# Patient Record
Sex: MALE | Race: ASIAN | NOT HISPANIC OR LATINO | Employment: FULL TIME | URBAN - METROPOLITAN AREA
[De-identification: names, ages, dates, MRNs, and addresses within clinical notes are randomized per-mention and may not be internally consistent; named-entity substitution may affect disease eponyms.]

---

## 2023-01-27 ENCOUNTER — TELEPHONE (OUTPATIENT)
Dept: GASTROENTEROLOGY | Facility: CLINIC | Age: 58
End: 2023-01-27
Payer: COMMERCIAL

## 2023-01-27 NOTE — TELEPHONE ENCOUNTER
----- Message from Connie Nicholson sent at 1/27/2023  3:10 PM CST -----  Type: Patient Call Back        Who called: self         What is the request in detail: Pt states he would like to get an colonoscopy done ...         Can the clinic reply by [facility]? No         Would the patient rather a call back or a response via [facility]? Yes         Best call back number: [phone] (home)               Thank You

## 2023-01-27 NOTE — TELEPHONE ENCOUNTER
Patient is scheduled for in clinic appointment on 03/01/2023 @ 9:00 am with Dr. Kerns to establish care.

## 2023-03-01 ENCOUNTER — OFFICE VISIT (OUTPATIENT)
Dept: GASTROENTEROLOGY | Facility: CLINIC | Age: 58
End: 2023-03-01
Payer: COMMERCIAL

## 2023-03-01 VITALS
SYSTOLIC BLOOD PRESSURE: 147 MMHG | BODY MASS INDEX: 27.22 KG/M2 | WEIGHT: 163.38 LBS | DIASTOLIC BLOOD PRESSURE: 77 MMHG | HEART RATE: 62 BPM | HEIGHT: 65 IN

## 2023-03-01 DIAGNOSIS — Z12.11 COLON CANCER SCREENING: Primary | ICD-10-CM

## 2023-03-01 DIAGNOSIS — R19.7 DIARRHEA, UNSPECIFIED TYPE: ICD-10-CM

## 2023-03-01 PROCEDURE — 99999 PR PBB SHADOW E&M-EST. PATIENT-LVL IV: CPT | Mod: PBBFAC,,, | Performed by: STUDENT IN AN ORGANIZED HEALTH CARE EDUCATION/TRAINING PROGRAM

## 2023-03-01 PROCEDURE — 3078F PR MOST RECENT DIASTOLIC BLOOD PRESSURE < 80 MM HG: ICD-10-PCS | Mod: CPTII,S$GLB,, | Performed by: STUDENT IN AN ORGANIZED HEALTH CARE EDUCATION/TRAINING PROGRAM

## 2023-03-01 PROCEDURE — 3008F PR BODY MASS INDEX (BMI) DOCUMENTED: ICD-10-PCS | Mod: CPTII,S$GLB,, | Performed by: STUDENT IN AN ORGANIZED HEALTH CARE EDUCATION/TRAINING PROGRAM

## 2023-03-01 PROCEDURE — 3077F SYST BP >= 140 MM HG: CPT | Mod: CPTII,S$GLB,, | Performed by: STUDENT IN AN ORGANIZED HEALTH CARE EDUCATION/TRAINING PROGRAM

## 2023-03-01 PROCEDURE — 1159F PR MEDICATION LIST DOCUMENTED IN MEDICAL RECORD: ICD-10-PCS | Mod: CPTII,S$GLB,, | Performed by: STUDENT IN AN ORGANIZED HEALTH CARE EDUCATION/TRAINING PROGRAM

## 2023-03-01 PROCEDURE — 1159F MED LIST DOCD IN RCRD: CPT | Mod: CPTII,S$GLB,, | Performed by: STUDENT IN AN ORGANIZED HEALTH CARE EDUCATION/TRAINING PROGRAM

## 2023-03-01 PROCEDURE — 3078F DIAST BP <80 MM HG: CPT | Mod: CPTII,S$GLB,, | Performed by: STUDENT IN AN ORGANIZED HEALTH CARE EDUCATION/TRAINING PROGRAM

## 2023-03-01 PROCEDURE — 99204 PR OFFICE/OUTPT VISIT, NEW, LEVL IV, 45-59 MIN: ICD-10-PCS | Mod: S$GLB,,, | Performed by: STUDENT IN AN ORGANIZED HEALTH CARE EDUCATION/TRAINING PROGRAM

## 2023-03-01 PROCEDURE — 4010F PR ACE/ARB THEARPY RXD/TAKEN: ICD-10-PCS | Mod: CPTII,S$GLB,, | Performed by: STUDENT IN AN ORGANIZED HEALTH CARE EDUCATION/TRAINING PROGRAM

## 2023-03-01 PROCEDURE — 3077F PR MOST RECENT SYSTOLIC BLOOD PRESSURE >= 140 MM HG: ICD-10-PCS | Mod: CPTII,S$GLB,, | Performed by: STUDENT IN AN ORGANIZED HEALTH CARE EDUCATION/TRAINING PROGRAM

## 2023-03-01 PROCEDURE — 99999 PR PBB SHADOW E&M-EST. PATIENT-LVL IV: ICD-10-PCS | Mod: PBBFAC,,, | Performed by: STUDENT IN AN ORGANIZED HEALTH CARE EDUCATION/TRAINING PROGRAM

## 2023-03-01 PROCEDURE — 4010F ACE/ARB THERAPY RXD/TAKEN: CPT | Mod: CPTII,S$GLB,, | Performed by: STUDENT IN AN ORGANIZED HEALTH CARE EDUCATION/TRAINING PROGRAM

## 2023-03-01 PROCEDURE — 99204 OFFICE O/P NEW MOD 45 MIN: CPT | Mod: S$GLB,,, | Performed by: STUDENT IN AN ORGANIZED HEALTH CARE EDUCATION/TRAINING PROGRAM

## 2023-03-01 PROCEDURE — 3008F BODY MASS INDEX DOCD: CPT | Mod: CPTII,S$GLB,, | Performed by: STUDENT IN AN ORGANIZED HEALTH CARE EDUCATION/TRAINING PROGRAM

## 2023-03-01 RX ORDER — TENOFOVIR ALAFENAMIDE 25 MG/1
1 TABLET ORAL
COMMUNITY
Start: 2023-01-24

## 2023-03-01 RX ORDER — LOSARTAN POTASSIUM 25 MG/1
25 TABLET ORAL DAILY
COMMUNITY
Start: 2023-02-28

## 2023-03-01 RX ORDER — SITAGLIPTIN AND METFORMIN HYDROCHLORIDE 1000; 100 MG/1; MG/1
1 TABLET, FILM COATED, EXTENDED RELEASE ORAL EVERY MORNING
COMMUNITY
Start: 2023-01-13

## 2023-03-01 RX ORDER — INSULIN GLARGINE 100 [IU]/ML
INJECTION, SOLUTION SUBCUTANEOUS
Status: ON HOLD | COMMUNITY
Start: 2023-01-24 | End: 2024-01-18 | Stop reason: HOSPADM

## 2023-03-01 NOTE — PROGRESS NOTES
[facility] Consultation Note    Reason for Consult:  The primary encounter diagnosis was Colon cancer screening. A diagnosis of Diarrhea, unspecified type was also pertinent to this visit.    PCP:   Daniella Leal   No address on file    Referring MD:  Aaareferral Self  No address on file    HPI:  This is a 57 y.o. male here for evaluation of colon cancer screening.    The patient is here with his sister who helps translate.  He is here to discuss colon cancer screening.  The patient has not previously had an endoscopic or stool based method of screening.  He is interested in proceed with a colonoscopy.      Denies any constipation or blood in his stool.  He does have loose stools intermittently.  Symptoms are variable and he wonders if due to his diabetes medication.        ROS:  Constitutional: No fevers, chills, No weight loss  ENT: No allergies  CV: No chest pain  Pulm: No cough, No shortness of breath  Ophtho: No vision changes  GI: see HPI  Derm: No rash  Heme: No lymphadenopathy, No bruising  MSK: No arthritis  : No dysuria, No hematuria  Endo: No hot or cold intolerance  Neuro: No syncope, No seizure  Psych: No anxiety, No depression    Medical History:  has a past medical history of Diabetes mellitus.    Surgical History:  has no past surgical history on file.    Family History: family history is not on file..     Social History:  reports that he has quit smoking. He does not have any smokeless tobacco history on file. He reports that he does not drink alcohol and does not use drugs.    Review of patient's allergies indicates:  No Known Allergies    Current Outpatient Rx   Medication Sig Dispense Refill    atorvastatin (LIPITOR) 20 MG tablet Take 20 mg by mouth once daily.      BASAGLAR KWIKPEN U-100 INSULIN glargine 100 units/mL SubQ pen SMARTSI Unit(s) SUB-Q Every Night      empagliflozin 10 mg Tab Take 10 mg by mouth once daily.      ergocalciferol (ERGOCALCIFEROL) 50,000 unit  "Cap Take 50,000 Units by mouth every 7 days.      insulin aspart protamine-insulin aspart (NOVOLOG 70/30) 100 unit/mL (70-30) InPn pen Inject into the skin 2 (two) times daily before meals.      insulin glargine (LANTUS) 100 unit/mL injection Inject into the skin every evening.      JANUMET -1,000 mg TM24 Take 1 tablet by mouth every morning.      losartan (COZAAR) 25 MG tablet Take 25 mg by mouth.      VEMLIDY 25 mg Tab Take 1 tablet by mouth.      alogliptin-pioglitazone 12.5-15 mg Tab Take by mouth once daily.         Objective Findings:    Vital Signs:  BP (!) 147/77   Pulse 62   Ht 5' 5" (1.651 m)   Wt 74.1 kg (163 lb 5.8 oz)   BMI 27.18 kg/m²   Body mass index is 27.18 kg/m².    Physical Exam:  General Appearance: Well appearing in no acute distress  Head:   Normocephalic, without obvious abnormality  Eyes:    No scleral icterus, EOMI  ENT: Neck supple, Lips, mucosa, and tongue normal; teeth and gums normal  Lungs: CTA bilaterally in anterior and posterior fields, no wheezes, no crackles.  Heart:  Regular rate and rhythm, S1, S2 normal, no murmurs heard  Abdomen: Soft, non tender, non distended with positive bowel sounds in all four quadrants. No hepatosplenomegaly, ascites, or mass  Extremities: 2+ pulses, no clubbing, cyanosis or edema  Skin: No rash  Neurologic: CN II-XII intact      Labs:  Lab Results   Component Value Date    WBC 5.84 11/24/2015    HGB 15.1 11/24/2015    HCT 43.7 11/24/2015     11/24/2015    ALT 32 11/24/2015    AST 20 11/24/2015     11/24/2015    K 3.8 11/24/2015     11/24/2015    CREATININE 0.7 11/24/2015    BUN 16 11/24/2015    CO2 24 11/24/2015    INR 1.0 11/24/2015       Assessment:  1. Colon cancer screening    2. Diarrhea, unspecified type      The patient will be scheduled for a screening colonoscopy at next available.  The risks/benefits of the procedure, as well as alternatives, were discussed with the patient.  He was agreeable to " proceeding.    Regarding his diarrhea, this is largely resolved and seemed to have been diet/med related.  He will contact my office is symptoms recur.    Follow up if symptoms worsen or fail to improve.      Order summary:  Orders Placed This Encounter    Ambulatory referral/consult to Endo Procedure          Thank you so much for allowing me to participate in the care of Malou Suarez    Meliton Alfaro MD

## 2023-05-19 ENCOUNTER — TELEPHONE (OUTPATIENT)
Dept: ENDOSCOPY | Facility: HOSPITAL | Age: 58
End: 2023-05-19
Payer: COMMERCIAL

## 2023-07-03 ENCOUNTER — TELEPHONE (OUTPATIENT)
Dept: ENDOSCOPY | Facility: HOSPITAL | Age: 58
End: 2023-07-03

## 2023-07-03 NOTE — TELEPHONE ENCOUNTER
Contacted the patient to schedule an endoscopy procedure(s) colonoscopy. The patient did not answer the call and left a voice message requesting a call back.  Follow up PAT appointment scheduled.

## 2023-08-22 ENCOUNTER — TELEPHONE (OUTPATIENT)
Dept: ENDOSCOPY | Facility: HOSPITAL | Age: 58
End: 2023-08-22

## 2023-09-20 ENCOUNTER — TELEPHONE (OUTPATIENT)
Dept: ENDOSCOPY | Facility: HOSPITAL | Age: 58
End: 2023-09-20
Payer: COMMERCIAL

## 2023-09-20 NOTE — TELEPHONE ENCOUNTER
Spoke with pt cousin. Pt will call back when January schedule open for [facility] colonoscopy scheduling

## 2023-10-05 ENCOUNTER — TELEPHONE (OUTPATIENT)
Dept: ENDOSCOPY | Facility: HOSPITAL | Age: 58
End: 2023-10-05
Payer: COMMERCIAL

## 2023-10-05 NOTE — TELEPHONE ENCOUNTER
Rec'd message from pt's cousin to call and schedule pt's colonoscopy. Cousin is not listed on pt contacts. Called daughter, brown who is. The person who answered states that Brown is at school.  Informed that Mr. Suarez will need to call his PCP to get other family members added to his chart so we can speak with them to schedule. She verbalized understanding and will ask him to call.

## 2023-10-06 ENCOUNTER — TELEPHONE (OUTPATIENT)
Dept: ENDOSCOPY | Facility: HOSPITAL | Age: 58
End: 2023-10-06
Payer: COMMERCIAL

## 2023-10-06 NOTE — TELEPHONE ENCOUNTER
Rec'd call from pt's cousin Alycia to schedule pt's colonoscopy. Alycia is not listed as a pt contact. Informed that she will need to have pt call his PCP or [facility] main # to add contact's on. Alycia verbalized understanding.

## 2024-01-11 ENCOUNTER — HOSPITAL ENCOUNTER (OUTPATIENT)
Facility: HOSPITAL | Age: 59
Discharge: HOME | DRG: 392 | End: 2024-01-13
Attending: EMERGENCY MEDICINE | Admitting: INTERNAL MEDICINE
Payer: COMMERCIAL

## 2024-01-11 DIAGNOSIS — K80.00 ACUTE CHOLECYSTITIS DUE TO BILIARY CALCULUS: ICD-10-CM

## 2024-01-11 DIAGNOSIS — K81.0 ACUTE CHOLECYSTITIS: ICD-10-CM

## 2024-01-11 DIAGNOSIS — R07.89 CHEST DISCOMFORT: ICD-10-CM

## 2024-01-11 DIAGNOSIS — R10.9 LEFT FLANK PAIN: ICD-10-CM

## 2024-01-11 DIAGNOSIS — R10.12 LEFT UPPER QUADRANT ABDOMINAL PAIN: Primary | ICD-10-CM

## 2024-01-11 DIAGNOSIS — R07.9 CHEST PAIN: ICD-10-CM

## 2024-01-11 DIAGNOSIS — R19.5 POSITIVE COLORECTAL CANCER SCREENING USING COLOGUARD TEST: ICD-10-CM

## 2024-01-11 LAB
ALBUMIN SERPL BCP-MCNC: 3.8 G/DL (ref 3.5–5.2)
ALP SERPL-CCNC: 41 U/L (ref 55–135)
ALT SERPL W/O P-5'-P-CCNC: 60 U/L (ref 10–44)
ANION GAP SERPL CALC-SCNC: 10 MMOL/L (ref 8–16)
AST SERPL-CCNC: 18 U/L (ref 10–40)
BASOPHILS # BLD AUTO: 0.04 K/UL (ref 0–0.2)
BASOPHILS NFR BLD: 0.4 % (ref 0–1.9)
BILIRUB SERPL-MCNC: 0.4 MG/DL (ref 0.1–1)
BUN SERPL-MCNC: 16 MG/DL (ref 6–20)
CALCIUM SERPL-MCNC: 8.5 MG/DL (ref 8.7–10.5)
CHLORIDE SERPL-SCNC: 106 MMOL/L (ref 95–110)
CO2 SERPL-SCNC: 25 MMOL/L (ref 23–29)
CREAT SERPL-MCNC: 0.8 MG/DL (ref 0.5–1.4)
DIFFERENTIAL METHOD BLD: NORMAL
EOSINOPHIL # BLD AUTO: 0.3 K/UL (ref 0–0.5)
EOSINOPHIL NFR BLD: 2.7 % (ref 0–8)
ERYTHROCYTE [DISTWIDTH] IN BLOOD BY AUTOMATED COUNT: 12.9 % (ref 11.5–14.5)
EST. GFR  (NO RACE VARIABLE): >60 ML/MIN/1.73 M^2
GLUCOSE SERPL-MCNC: 181 MG/DL (ref 70–110)
HCT VFR BLD AUTO: 42.2 % (ref 40–54)
HGB BLD-MCNC: 14.5 G/DL (ref 14–18)
IMM GRANULOCYTES # BLD AUTO: 0.04 K/UL (ref 0–0.04)
IMM GRANULOCYTES NFR BLD AUTO: 0.4 % (ref 0–0.5)
LIPASE SERPL-CCNC: 64 U/L (ref 4–60)
LYMPHOCYTES # BLD AUTO: 3.2 K/UL (ref 1–4.8)
LYMPHOCYTES NFR BLD: 29.2 % (ref 18–48)
MCH RBC QN AUTO: 29.7 PG (ref 27–31)
MCHC RBC AUTO-ENTMCNC: 34.4 G/DL (ref 32–36)
MCV RBC AUTO: 86 FL (ref 82–98)
MONOCYTES # BLD AUTO: 0.6 K/UL (ref 0.3–1)
MONOCYTES NFR BLD: 5.9 % (ref 4–15)
NEUTROPHILS # BLD AUTO: 6.7 K/UL (ref 1.8–7.7)
NEUTROPHILS NFR BLD: 61.4 % (ref 38–73)
NRBC BLD-RTO: 0 /100 WBC
PLATELET # BLD AUTO: 212 K/UL (ref 150–450)
PMV BLD AUTO: 9.4 FL (ref 9.2–12.9)
POTASSIUM SERPL-SCNC: 3.4 MMOL/L (ref 3.5–5.1)
PROT SERPL-MCNC: 7.1 G/DL (ref 6–8.4)
RBC # BLD AUTO: 4.89 M/UL (ref 4.6–6.2)
SODIUM SERPL-SCNC: 141 MMOL/L (ref 136–145)
WBC # BLD AUTO: 10.87 K/UL (ref 3.9–12.7)

## 2024-01-11 PROCEDURE — 99285 EMERGENCY DEPT VISIT HI MDM: CPT | Mod: 25

## 2024-01-11 PROCEDURE — 83690 ASSAY OF LIPASE: CPT | Performed by: EMERGENCY MEDICINE

## 2024-01-11 PROCEDURE — 96375 TX/PRO/DX INJ NEW DRUG ADDON: CPT

## 2024-01-11 PROCEDURE — 85025 COMPLETE CBC W/AUTO DIFF WBC: CPT | Performed by: EMERGENCY MEDICINE

## 2024-01-11 PROCEDURE — 84484 ASSAY OF TROPONIN QUANT: CPT | Performed by: EMERGENCY MEDICINE

## 2024-01-11 PROCEDURE — 96374 THER/PROPH/DIAG INJ IV PUSH: CPT | Mod: 59

## 2024-01-11 PROCEDURE — 80053 COMPREHEN METABOLIC PANEL: CPT | Performed by: EMERGENCY MEDICINE

## 2024-01-11 PROCEDURE — 93005 ELECTROCARDIOGRAM TRACING: CPT

## 2024-01-11 PROCEDURE — 63600175 PHARM REV CODE 636 W HCPCS: Performed by: EMERGENCY MEDICINE

## 2024-01-11 PROCEDURE — 96374 THER/PROPH/DIAG INJ IV PUSH: CPT

## 2024-01-11 RX ORDER — ONDANSETRON HYDROCHLORIDE 2 MG/ML
4 INJECTION, SOLUTION INTRAVENOUS
Status: COMPLETED | OUTPATIENT
Start: 2024-01-11 | End: 2024-01-11

## 2024-01-11 RX ORDER — LIDOCAINE HYDROCHLORIDE 20 MG/ML
15 SOLUTION OROPHARYNGEAL ONCE
Status: COMPLETED | OUTPATIENT
Start: 2024-01-12 | End: 2024-01-11

## 2024-01-11 RX ORDER — MORPHINE SULFATE 4 MG/ML
4 INJECTION, SOLUTION INTRAMUSCULAR; INTRAVENOUS
Status: COMPLETED | OUTPATIENT
Start: 2024-01-11 | End: 2024-01-11

## 2024-01-11 RX ORDER — ALUMINUM HYDROXIDE, MAGNESIUM HYDROXIDE, AND SIMETHICONE 1200; 120; 1200 MG/30ML; MG/30ML; MG/30ML
30 SUSPENSION ORAL ONCE
Status: COMPLETED | OUTPATIENT
Start: 2024-01-12 | End: 2024-01-11

## 2024-01-11 RX ADMIN — MORPHINE SULFATE 4 MG: 4 INJECTION, SOLUTION INTRAMUSCULAR; INTRAVENOUS at 11:01

## 2024-01-11 RX ADMIN — ONDANSETRON 4 MG: 2 INJECTION INTRAMUSCULAR; INTRAVENOUS at 11:01

## 2024-01-11 RX ADMIN — ALUMINUM HYDROXIDE, MAGNESIUM HYDROXIDE, AND DIMETHICONE 30 ML: 200; 20; 200 SUSPENSION ORAL at 11:01

## 2024-01-11 RX ADMIN — LIDOCAINE HYDROCHLORIDE 15 ML: 20 SOLUTION ORAL; TOPICAL at 11:01

## 2024-01-12 PROBLEM — R07.89 OTHER CHEST PAIN: Status: ACTIVE | Noted: 2024-01-12

## 2024-01-12 PROBLEM — R10.12 LEFT UPPER QUADRANT PAIN: Status: ACTIVE | Noted: 2024-01-12

## 2024-01-12 LAB
ASCENDING AORTA: 3.31 CM
AV INDEX (PROSTH): 0.71
AV MEAN GRADIENT: 4 MMHG
AV PEAK GRADIENT: 6 MMHG
AV VALVE AREA BY VELOCITY RATIO: 2.66 CM²
AV VALVE AREA: 2.53 CM²
AV VELOCITY RATIO: 0.75
BACTERIA #/AREA URNS HPF: NORMAL /HPF
BILIRUB UR QL STRIP: NEGATIVE
BSA FOR ECHO PROCEDURE: 1.8 M2
CLARITY UR: CLEAR
COLOR UR: YELLOW
CV ECHO LV RWT: 0.35 CM
DOP CALC AO PEAK VEL: 1.27 M/S
DOP CALC AO VTI: 30.7 CM
DOP CALC LVOT AREA: 3.6 CM2
DOP CALC LVOT DIAMETER: 2.13 CM
DOP CALC LVOT PEAK VEL: 0.95 M/S
DOP CALC LVOT STROKE VOLUME: 77.64 CM3
DOP CALCLVOT PEAK VEL VTI: 21.8 CM
E WAVE DECELERATION TIME: 194.17 MSEC
E/A RATIO: 1.04
E/E' RATIO: 10.27 M/S
ECHO LV POSTERIOR WALL: 0.88 CM (ref 0.6–1.1)
FRACTIONAL SHORTENING: 30 % (ref 28–44)
GLUCOSE UR QL STRIP: ABNORMAL
HGB UR QL STRIP: NEGATIVE
INTERVENTRICULAR SEPTUM: 0.9 CM (ref 0.6–1.1)
IVC DIAMETER: 1.45 CM
IVRT: 111.32 MSEC
KETONES UR QL STRIP: NEGATIVE
LA MAJOR: 5 CM
LA MINOR: 4.28 CM
LA WIDTH: 4.3 CM
LEFT ATRIUM SIZE: 3.9 CM
LEFT ATRIUM VOLUME INDEX: 37.1 ML/M2
LEFT ATRIUM VOLUME: 65.74 CM3
LEFT INTERNAL DIMENSION IN SYSTOLE: 3.56 CM (ref 2.1–4)
LEFT VENTRICLE DIASTOLIC VOLUME INDEX: 69.84 ML/M2
LEFT VENTRICLE DIASTOLIC VOLUME: 123.62 ML
LEFT VENTRICLE MASS INDEX: 91 G/M2
LEFT VENTRICLE SYSTOLIC VOLUME INDEX: 30 ML/M2
LEFT VENTRICLE SYSTOLIC VOLUME: 53.02 ML
LEFT VENTRICULAR INTERNAL DIMENSION IN DIASTOLE: 5.1 CM (ref 3.5–6)
LEFT VENTRICULAR MASS: 161.18 G
LEUKOCYTE ESTERASE UR QL STRIP: NEGATIVE
LV LATERAL E/E' RATIO: 7.7 M/S
LV SEPTAL E/E' RATIO: 15.4 M/S
LVOT MG: 1.78 MMHG
LVOT MV: 0.61 CM/S
MICROSCOPIC COMMENT: NORMAL
MV PEAK A VEL: 0.74 M/S
MV PEAK E VEL: 0.77 M/S
MV STENOSIS PRESSURE HALF TIME: 56.31 MS
MV VALVE AREA P 1/2 METHOD: 3.91 CM2
NITRITE UR QL STRIP: NEGATIVE
PH UR STRIP: 7 [PH] (ref 5–8)
PISA TR MAX VEL: 1.73 M/S
POCT GLUCOSE: 122 MG/DL (ref 70–110)
POCT GLUCOSE: 143 MG/DL (ref 70–110)
POCT GLUCOSE: 188 MG/DL (ref 70–110)
PROT UR QL STRIP: NEGATIVE
PULM VEIN S/D RATIO: 0.98
PV PEAK D VEL: 0.45 M/S
PV PEAK GRADIENT: 3 MMHG
PV PEAK S VEL: 0.44 M/S
PV PEAK VELOCITY: 0.92 M/S
RA MAJOR: 4.86 CM
RA PRESSURE ESTIMATED: 3 MMHG
RA WIDTH: 4.29 CM
RIGHT VENTRICULAR END-DIASTOLIC DIMENSION: 4.5 CM
RV TB RVSP: 5 MMHG
SINUS: 3.24 CM
SP GR UR STRIP: >1.03 (ref 1–1.03)
STJ: 2.7 CM
TDI LATERAL: 0.1 M/S
TDI SEPTAL: 0.05 M/S
TDI: 0.08 M/S
TR MAX PG: 12 MMHG
TRICUSPID ANNULAR PLANE SYSTOLIC EXCURSION: 1.82 CM
TROPONIN I SERPL DL<=0.01 NG/ML-MCNC: 0.01 NG/ML (ref 0–0.03)
TROPONIN I SERPL DL<=0.01 NG/ML-MCNC: <0.006 NG/ML (ref 0–0.03)
TV PEAK GRADIENT: 1 MMHG
TV REST PULMONARY ARTERY PRESSURE: 15 MMHG
URN SPEC COLLECT METH UR: ABNORMAL
UROBILINOGEN UR STRIP-ACNC: NEGATIVE EU/DL
YEAST URNS QL MICRO: NORMAL
Z-SCORE OF LEFT VENTRICULAR DIMENSION IN END DIASTOLE: 0.41
Z-SCORE OF LEFT VENTRICULAR DIMENSION IN END SYSTOLE: 1.29

## 2024-01-12 PROCEDURE — 63600175 PHARM REV CODE 636 W HCPCS: Mod: JZ,JG

## 2024-01-12 PROCEDURE — 93010 ELECTROCARDIOGRAM REPORT: CPT | Mod: ,,, | Performed by: INTERNAL MEDICINE

## 2024-01-12 PROCEDURE — 25000003 PHARM REV CODE 250: Performed by: STUDENT IN AN ORGANIZED HEALTH CARE EDUCATION/TRAINING PROGRAM

## 2024-01-12 PROCEDURE — 96376 TX/PRO/DX INJ SAME DRUG ADON: CPT | Mod: 59

## 2024-01-12 PROCEDURE — 25000003 PHARM REV CODE 250: Performed by: INTERNAL MEDICINE

## 2024-01-12 PROCEDURE — 36415 COLL VENOUS BLD VENIPUNCTURE: CPT

## 2024-01-12 PROCEDURE — 11000001 HC ACUTE MED/SURG PRIVATE ROOM

## 2024-01-12 PROCEDURE — 99223 1ST HOSP IP/OBS HIGH 75: CPT | Mod: ,,, | Performed by: SURGERY

## 2024-01-12 PROCEDURE — 25000003 PHARM REV CODE 250: Performed by: PHYSICIAN ASSISTANT

## 2024-01-12 PROCEDURE — 96361 HYDRATE IV INFUSION ADD-ON: CPT

## 2024-01-12 PROCEDURE — 99223 1ST HOSP IP/OBS HIGH 75: CPT | Mod: 25,,, | Performed by: INTERNAL MEDICINE

## 2024-01-12 PROCEDURE — 25000003 PHARM REV CODE 250: Performed by: EMERGENCY MEDICINE

## 2024-01-12 PROCEDURE — 25000003 PHARM REV CODE 250: Performed by: HOSPITALIST

## 2024-01-12 PROCEDURE — G0378 HOSPITAL OBSERVATION PER HR: HCPCS

## 2024-01-12 PROCEDURE — 84484 ASSAY OF TROPONIN QUANT: CPT

## 2024-01-12 PROCEDURE — 25500020 PHARM REV CODE 255: Performed by: INTERNAL MEDICINE

## 2024-01-12 PROCEDURE — 81000 URINALYSIS NONAUTO W/SCOPE: CPT | Performed by: EMERGENCY MEDICINE

## 2024-01-12 PROCEDURE — 93005 ELECTROCARDIOGRAM TRACING: CPT

## 2024-01-12 RX ORDER — GLUCAGON 1 MG
1 KIT INJECTION
Status: DISCONTINUED | OUTPATIENT
Start: 2024-01-12 | End: 2024-01-13 | Stop reason: HOSPADM

## 2024-01-12 RX ORDER — SODIUM CHLORIDE 9 MG/ML
INJECTION, SOLUTION INTRAVENOUS CONTINUOUS
Status: DISCONTINUED | OUTPATIENT
Start: 2024-01-12 | End: 2024-01-13

## 2024-01-12 RX ORDER — IBUPROFEN 200 MG
16 TABLET ORAL
Status: DISCONTINUED | OUTPATIENT
Start: 2024-01-12 | End: 2024-01-13 | Stop reason: HOSPADM

## 2024-01-12 RX ORDER — INSULIN ASPART 100 [IU]/ML
0-5 INJECTION, SOLUTION INTRAVENOUS; SUBCUTANEOUS
Status: DISCONTINUED | OUTPATIENT
Start: 2024-01-12 | End: 2024-01-13 | Stop reason: HOSPADM

## 2024-01-12 RX ORDER — ACETAMINOPHEN 325 MG/1
650 TABLET ORAL EVERY 6 HOURS PRN
Status: DISCONTINUED | OUTPATIENT
Start: 2024-01-12 | End: 2024-01-13 | Stop reason: HOSPADM

## 2024-01-12 RX ORDER — POTASSIUM CHLORIDE 20 MEQ/1
40 TABLET, EXTENDED RELEASE ORAL ONCE
Status: COMPLETED | OUTPATIENT
Start: 2024-01-12 | End: 2024-01-12

## 2024-01-12 RX ORDER — IBUPROFEN 200 MG
24 TABLET ORAL
Status: DISCONTINUED | OUTPATIENT
Start: 2024-01-12 | End: 2024-01-13 | Stop reason: HOSPADM

## 2024-01-12 RX ORDER — ONDANSETRON HYDROCHLORIDE 2 MG/ML
4 INJECTION, SOLUTION INTRAVENOUS EVERY 4 HOURS PRN
Status: DISCONTINUED | OUTPATIENT
Start: 2024-01-12 | End: 2024-01-13 | Stop reason: HOSPADM

## 2024-01-12 RX ORDER — MORPHINE SULFATE 4 MG/ML
4 INJECTION, SOLUTION INTRAMUSCULAR; INTRAVENOUS ONCE
Status: COMPLETED | OUTPATIENT
Start: 2024-01-12 | End: 2024-01-12

## 2024-01-12 RX ADMIN — POTASSIUM CHLORIDE 40 MEQ: 1500 TABLET, EXTENDED RELEASE ORAL at 06:01

## 2024-01-12 RX ADMIN — SODIUM CHLORIDE: 9 INJECTION, SOLUTION INTRAVENOUS at 06:01

## 2024-01-12 RX ADMIN — ACETAMINOPHEN 650 MG: 325 TABLET ORAL at 08:01

## 2024-01-12 RX ADMIN — MORPHINE SULFATE 4 MG: 4 INJECTION, SOLUTION INTRAMUSCULAR; INTRAVENOUS at 11:01

## 2024-01-12 RX ADMIN — IOHEXOL 80 ML: 350 INJECTION, SOLUTION INTRAVENOUS at 02:01

## 2024-01-12 RX ADMIN — SODIUM CHLORIDE 1000 ML: 9 INJECTION, SOLUTION INTRAVENOUS at 03:01

## 2024-01-12 NOTE — ASSESSMENT & PLAN NOTE
Malou Suarez is a 59 yo M with DM and HTN who presents with resolving/resolved LUQ and left chest wall pain. During physical exam, he states that his left chest wall was the primary location of this pain and his abdominal exam is benign. It would be very unusual for acute cholecystitis to present with left sided chest pain/LUQ pain that resolves without any antibiotics or surgery. CT has some soft signs of cholecystitis and cholelithiasis.     - admitted to the hospital medicine service  - would not start antibiotics at this time as this may mask evolution of symptoms should he have cholecystitis  - HIDA scan ordered to rule out cholecystitis   - will follow up results of HIDA for further plan

## 2024-01-12 NOTE — SUBJECTIVE & OBJECTIVE
No current facility-administered medications on file prior to encounter.     Current Outpatient Medications on File Prior to Encounter   Medication Sig    alogliptin-pioglitazone 12.5-15 mg Tab Take by mouth once daily.    atorvastatin (LIPITOR) 20 MG tablet Take 20 mg by mouth once daily.    BASAGLAR KWIKPEN U-100 INSULIN glargine 100 units/mL SubQ pen SMARTSI Unit(s) SUB-Q Every Night    empagliflozin 10 mg Tab Take 10 mg by mouth once daily.    ergocalciferol (ERGOCALCIFEROL) 50,000 unit Cap Take 50,000 Units by mouth every 7 days.    insulin aspart protamine-insulin aspart (NOVOLOG 70/30) 100 unit/mL (70-30) InPn pen Inject into the skin 2 (two) times daily before meals.    insulin glargine (LANTUS) 100 unit/mL injection Inject into the skin every evening.    JANUMET -1,000 mg TM24 Take 1 tablet by mouth every morning.    losartan (COZAAR) 25 MG tablet Take 25 mg by mouth.    VEMLIDY 25 mg Tab Take 1 tablet by mouth.       Review of patient's allergies indicates:  No Known Allergies    Past Medical History:   Diagnosis Date    Diabetes mellitus      History reviewed. No pertinent surgical history.  Family History    None       Tobacco Use    Smoking status: Former    Smokeless tobacco: Not on file   Substance and Sexual Activity    Alcohol use: No    Drug use: No    Sexual activity: Not on file     Review of Systems   Constitutional:  Negative for activity change, appetite change, chills, fatigue and fever.   Respiratory:  Negative for cough, chest tightness and shortness of breath.    Cardiovascular:  Positive for chest pain. Negative for palpitations.   Gastrointestinal:  Positive for abdominal pain. Negative for abdominal distention, anal bleeding, blood in stool, constipation, diarrhea, nausea, rectal pain and vomiting.   Genitourinary:  Negative for difficulty urinating, dysuria, flank pain and penile discharge.   Musculoskeletal:  Negative for arthralgias.   Neurological:  Negative for dizziness  and light-headedness.   Hematological:  Does not bruise/bleed easily.     Objective:     Vital Signs (Most Recent):  Temp: 97.8 °F (36.6 °C) (01/12/24 0808)  Pulse: 60 (01/12/24 0808)  Resp: 15 (01/12/24 0808)  BP: 125/65 (01/12/24 0808)  SpO2: 96 % (01/12/24 0808) Vital Signs (24h Range):  Temp:  [97.5 °F (36.4 °C)-97.9 °F (36.6 °C)] 97.8 °F (36.6 °C)  Pulse:  [46-66] 60  Resp:  [12-20] 15  SpO2:  [95 %-100 %] 96 %  BP: (109-151)/(55-72) 125/65     Weight: 70.3 kg (154 lb 15.4 oz)  Body mass index is 25.79 kg/m².     Physical Exam  Constitutional:       General: He is not in acute distress.     Appearance: Normal appearance. He is not ill-appearing.   HENT:      Head: Normocephalic and atraumatic.   Eyes:      Extraocular Movements: Extraocular movements intact.      Pupils: Pupils are equal, round, and reactive to light.   Cardiovascular:      Rate and Rhythm: Normal rate and regular rhythm.   Pulmonary:      Effort: Pulmonary effort is normal. No respiratory distress.   Chest:      Comments: When asked to point to his pain, he points to his left lateral chest wall, though it is not particularly tender to palpation  Abdominal:      General: Abdomen is flat. There is no distension.      Palpations: Abdomen is soft.      Tenderness: There is no abdominal tenderness. There is no guarding.      Comments: Abdomen is soft, non-distended, no tenderness in LUQ or RUQ. No guarding or rebound   Musculoskeletal:         General: No swelling. Normal range of motion.   Skin:     General: Skin is warm and dry.   Neurological:      General: No focal deficit present.      Mental Status: He is alert.            I have reviewed all pertinent lab results within the past 24 hours.  CBC:   Recent Labs   Lab 01/11/24  2300   WBC 10.87   RBC 4.89   HGB 14.5   HCT 42.2      MCV 86   MCH 29.7   MCHC 34.4     CMP:   Recent Labs   Lab 01/11/24  2300   *   CALCIUM 8.5*   ALBUMIN 3.8   PROT 7.1      K 3.4*   CO2 25   CL  106   BUN 16   CREATININE 0.8   ALKPHOS 41*   ALT 60*   AST 18   BILITOT 0.4       Significant Diagnostics:  I have reviewed all pertinent imaging results/findings within the past 24 hours.

## 2024-01-12 NOTE — CARE UPDATE
General Surgery Care Update    Due to the patients primary complaints of left sided chest pain during my encounter, I ordered an EKG. Nursing notified me that it had been done. The read shows possible new anterior infarct and new inferior ischemia. I ordered troponins and notified the primary team, Dr. De Oliveira, who said he would review it as well and consult cardiology.     Ari Vu MD   General Surgery, PGY-3

## 2024-01-12 NOTE — ASSESSMENT & PLAN NOTE
CT abdomen show possible cholecystitis,surgery is consulted,will have HIDA scan today.,Nausea,vomiting is resolved already

## 2024-01-12 NOTE — ADMISSIONCARE
AdmissionCare    Guideline: Gallbladder / Bile Duct Inflammation or Stone - INPT, Inpatient    Based on the indications selected for the patient, the bed status of Admit to Inpatient was determined to be MET    The following indications were selected as present at the time of evaluation of the patient:      Acute cholangitis, as indicated by ALL of the following:   -     Systemic signs of inflammation, as indicated by 1 or more of the following:    -      - White blood cell count greater than 10,000/mm3 (10 x109/L) or less than 4000/mm3 (4 x109/L)    Evidence of common bile duct disease, as indicated by 1 or more of the following:    -      - Hepatobiliary imaging showing biliary dilation or evidence of etiology (eg, stricture, stone, previously placed stent)    - Right upper quadrant pain, mass, or tenderness    AdmissionCare documentation entered by: LOLA Timmons    [facility], 27th edition, Copyright © 2023 [facility], [facility] All Rights Reserved.  [id number]:04-06:00

## 2024-01-12 NOTE — PLAN OF CARE
Case Management Assessment     PCP: Fred Ifeoma  Pharmacy: [facility] ([facility])    Patient Arrived From: Home   Existing Help at Home: Family    Barriers to Discharge: none    Discharge Plan:    A. Home with Family   B. Home with Family; TBD     Patient confirmed information on face sheet. Patient he lives with family who is able to help him at home. Patient stated he said he is independent with his ADLs. Patient plans to return home with family at discharge.         01/12/24 1540   Discharge Assessment   Assessment Type Discharge Planning Assessment   Confirmed/corrected address, phone number and insurance Yes   Confirmed Demographics Correct on Facesheet   Source of Information patient   If unable to respond/provide information was family/caregiver contacted? No Contact Information Available   Communicated ADRIA with patient/caregiver Date not available/Unable to determine   Reason For Admission Acute cholecystitis   People in Home child(darius), adult;spouse   Facility Arrived From: Home   Do you expect to return to your current living situation? Yes   Do you have help at home or someone to help you manage your care at home? Yes   Who are your caregiver(s) and their phone number(s)? Solomon Suarez (son) [phone]   Prior to hospitilization cognitive status: Alert/Oriented   Current cognitive status: Alert/Oriented   Equipment Currently Used at Home none   Who is going to help you get home at discharge? Solomon Suarez (son) [phone]   How do you get to doctors appointments? car, drives self;family or friend will provide   Are you on dialysis? No   Do you take coumadin? No   Discharge Plan A Home with family   Discharge Plan B Home with family;Home Health   DME Needed Upon Discharge  none   Discharge Plan discussed with: Patient;Adult children;Spouse/sig other   Name(s) and Number(s) Solomon Suarez (son) [phone]   Transition of Care Barriers None   OTHER   Name(s) of People in Home Solomon Suarez (son) [phone]

## 2024-01-12 NOTE — CONSULTS
[facility] Surg  General Surgery  Consult Note    Patient Name: Malou Suarez  MRN: [id number]  Code Status: No Order  Admission Date: 2024  Hospital Length of Stay: 0 days  Attending Physician: Haresh De Oliveira, *  Primary Care Provider: Daniella Leal MD    Patient information was obtained from patient and ER records.     Inpatient consult to General Surgery  Consult performed by: Ari Vu MD  Consult ordered by: Haresh De Oliveira MD        Subjective:     Principal Problem: Left upper quadrant pain    History of Present Illness: This is a 59 yo M with history of diabetes and hypertension who presented to the ED with complaints of left sided abdominal/chest wall pain. He states he has had two similar episodes over the last few weeks that resolve on their own. His pain is now significantly improved. He denies nausea/vomiting. He is afebrile, hemodynamically stable, normal bilirubin and has no leukocytosis. CT shows mild gallbladder distension with possible trace fluid and cholelithiasis.  He has not had any previous abdominal surgery.     No current facility-administered medications on file prior to encounter.     Current Outpatient Medications on File Prior to Encounter   Medication Sig    alogliptin-pioglitazone 12.5-15 mg Tab Take by mouth once daily.    atorvastatin (LIPITOR) 20 MG tablet Take 20 mg by mouth once daily.    BASAGLAR KWIKPEN U-100 INSULIN glargine 100 units/mL SubQ pen SMARTSI Unit(s) SUB-Q Every Night    empagliflozin 10 mg Tab Take 10 mg by mouth once daily.    ergocalciferol (ERGOCALCIFEROL) 50,000 unit Cap Take 50,000 Units by mouth every 7 days.    insulin aspart protamine-insulin aspart (NOVOLOG 70/30) 100 unit/mL (70-30) InPn pen Inject into the skin 2 (two) times daily before meals.    insulin glargine (LANTUS) 100 unit/mL injection Inject into the skin every evening.    JANUMET -1,000 mg TM24 Take 1 tablet by mouth every morning.    losartan (COZAAR) 25  MG tablet Take 25 mg by mouth.    VEMLIDY 25 mg Tab Take 1 tablet by mouth.       Review of patient's allergies indicates:  No Known Allergies    Past Medical History:   Diagnosis Date    Diabetes mellitus      History reviewed. No pertinent surgical history.  Family History    None       Tobacco Use    Smoking status: Former    Smokeless tobacco: Not on file   Substance and Sexual Activity    Alcohol use: No    Drug use: No    Sexual activity: Not on file     Review of Systems   Constitutional:  Negative for activity change, appetite change, chills, fatigue and fever.   Respiratory:  Negative for cough, chest tightness and shortness of breath.    Cardiovascular:  Positive for chest pain. Negative for palpitations.   Gastrointestinal:  Positive for abdominal pain. Negative for abdominal distention, anal bleeding, blood in stool, constipation, diarrhea, nausea, rectal pain and vomiting.   Genitourinary:  Negative for difficulty urinating, dysuria, flank pain and penile discharge.   Musculoskeletal:  Negative for arthralgias.   Neurological:  Negative for dizziness and light-headedness.   Hematological:  Does not bruise/bleed easily.     Objective:     Vital Signs (Most Recent):  Temp: 97.8 °F (36.6 °C) (01/12/24 0808)  Pulse: 60 (01/12/24 0808)  Resp: 15 (01/12/24 0808)  BP: 125/65 (01/12/24 0808)  SpO2: 96 % (01/12/24 0808) Vital Signs (24h Range):  Temp:  [97.5 °F (36.4 °C)-97.9 °F (36.6 °C)] 97.8 °F (36.6 °C)  Pulse:  [46-66] 60  Resp:  [12-20] 15  SpO2:  [95 %-100 %] 96 %  BP: (109-151)/(55-72) 125/65     Weight: 70.3 kg (154 lb 15.4 oz)  Body mass index is 25.79 kg/m².     Physical Exam  Constitutional:       General: He is not in acute distress.     Appearance: Normal appearance. He is not ill-appearing.   HENT:      Head: Normocephalic and atraumatic.   Eyes:      Extraocular Movements: Extraocular movements intact.      Pupils: Pupils are equal, round, and reactive to light.   Cardiovascular:      Rate and  Rhythm: Normal rate and regular rhythm.   Pulmonary:      Effort: Pulmonary effort is normal. No respiratory distress.   Chest:      Comments: When asked to point to his pain, he points to his left lateral chest wall, though it is not particularly tender to palpation  Abdominal:      General: Abdomen is flat. There is no distension.      Palpations: Abdomen is soft.      Tenderness: There is no abdominal tenderness. There is no guarding.      Comments: Abdomen is soft, non-distended, no tenderness in LUQ or RUQ. No guarding or rebound   Musculoskeletal:         General: No swelling. Normal range of motion.   Skin:     General: Skin is warm and dry.   Neurological:      General: No focal deficit present.      Mental Status: He is alert.            I have reviewed all pertinent lab results within the past 24 hours.  CBC:   Recent Labs   Lab 01/11/24  2300   WBC 10.87   RBC 4.89   HGB 14.5   HCT 42.2      MCV 86   MCH 29.7   MCHC 34.4     CMP:   Recent Labs   Lab 01/11/24  2300   *   CALCIUM 8.5*   ALBUMIN 3.8   PROT 7.1      K 3.4*   CO2 25      BUN 16   CREATININE 0.8   ALKPHOS 41*   ALT 60*   AST 18   BILITOT 0.4       Significant Diagnostics:  I have reviewed all pertinent imaging results/findings within the past 24 hours.    Assessment/Plan:     * Left upper quadrant pain  Malou Suarez is a 59 yo M with DM and HTN who presents with resolving/resolved LUQ and left chest wall pain. During physical exam, he states that his left chest wall was the primary location of this pain and his abdominal exam is benign. It would be very unusual for acute cholecystitis to present with left sided chest pain/LUQ pain that resolves without any antibiotics or surgery. CT has some soft signs of cholecystitis and cholelithiasis.     - admitted to the hospital medicine service  - would not start antibiotics at this time as this may mask evolution of symptoms should he have cholecystitis  - HIDA scan ordered to  rule out cholecystitis   - will follow up results of HIDA for further plan         VTE Risk Mitigation (From admission, onward)      None            Thank you for your consult. I will follow-up with patient. Please contact us if you have any additional questions.    Ari Vu MD  General Surgery  [facility] S[facility]

## 2024-01-12 NOTE — ED PROVIDER NOTES
"Encounter Date: 1/11/2024       History     Chief Complaint   Patient presents with    Abdominal Pain     Pt presents to the ED with c/o constant RUQ pain with n/v since this AM that feels "tight." Took motrin at 1930 and zantac at 2100 with no relief. Pt actively vomitting in triage.     58-year-old male with a history of diabetes presenting with left upper quadrant abdominal pain associated nausea and vomiting lasting for about an hour to 2 hours.  Patient reports he had similar symptoms on Sunday.  Denies chest pain, shortness of breath, fevers, chills, diarrhea, constipation.  Notes multiple episodes of nausea and vomiting.  Reports symptoms started this morning.      Review of patient's allergies indicates:  No Known Allergies  Past Medical History:   Diagnosis Date    Diabetes mellitus      History reviewed. No pertinent surgical history.  History reviewed. No pertinent family history.  Social History     Tobacco Use    Smoking status: Former   Substance Use Topics    Alcohol use: No    Drug use: No     Review of Systems   Constitutional:  Negative for chills and fever.   HENT:  Negative for sore throat.    Respiratory:  Negative for shortness of breath.    Cardiovascular:  Negative for chest pain.   Gastrointestinal:  Positive for abdominal pain, nausea and vomiting.   Genitourinary:  Negative for dysuria.   Musculoskeletal:  Negative for back pain.   Skin:  Negative for rash.   Neurological:  Negative for weakness.       Physical Exam     Initial Vitals   BP Pulse Resp Temp SpO2   01/11/24 2307 01/11/24 2222 01/11/24 2222 01/11/24 2222 01/11/24 2222   (!) 109/58 66 20 97.5 °F (36.4 °C) 100 %      MAP       --                Physical Exam    Nursing note and vitals reviewed.  Constitutional: He appears well-developed and well-nourished. He is not diaphoretic. No distress.   HENT:   Head: Normocephalic and atraumatic.   Eyes: Conjunctivae and EOM are normal. Pupils are equal, round, and reactive to light. No " scleral icterus.   Neck: Neck supple.   Normal range of motion.  Cardiovascular:  Normal rate, regular rhythm, normal heart sounds and intact distal pulses.     Exam reveals no gallop and no friction rub.       No murmur heard.  Pulmonary/Chest: Breath sounds normal. No stridor. No respiratory distress. He has no wheezes. He has no rhonchi. He has no rales.   Abdominal: Abdomen is soft. Bowel sounds are normal. He exhibits no distension. There is no abdominal tenderness. There is no rebound and no guarding.   Musculoskeletal:         General: No tenderness or edema. Normal range of motion.      Cervical back: Normal range of motion and neck supple.     Neurological: He is alert and oriented to person, place, and time. He has normal strength. No cranial nerve deficit. GCS score is 15. GCS eye subscore is 4. GCS verbal subscore is 5. GCS motor subscore is 6.   Skin: Skin is warm and dry. No rash noted.   Psychiatric: He has a normal mood and affect. His behavior is normal.         ED Course   Procedures  Labs Reviewed   COMPREHENSIVE METABOLIC PANEL - Abnormal; Notable for the following components:       Result Value    Potassium 3.4 (*)     Glucose 181 (*)     Calcium 8.5 (*)     Alkaline Phosphatase 41 (*)     ALT 60 (*)     All other components within normal limits   LIPASE - Abnormal; Notable for the following components:    Lipase 64 (*)     All other components within normal limits   URINALYSIS, REFLEX TO URINE CULTURE - Abnormal; Notable for the following components:    Specific Gravity, UA >1.030 (*)     Glucose, UA 4+ (*)     All other components within normal limits    Narrative:     Specimen Source->Urine   CBC W/ AUTO DIFFERENTIAL   TROPONIN I   TROPONIN I   URINALYSIS MICROSCOPIC    Narrative:     Specimen Source->Urine     EKG Readings: (Independently Interpreted)   Initial Reading: No STEMI. Rhythm: Sinus Bradycardia. Heart Rate: 48. Ectopy: No Ectopy.   No ST segment elevation or depression concerning  for acute ischemia.          Imaging Results              CT Abdomen Pelvis With IV Contrast NO Oral Contrast (In process)                      Medications   sodium chloride 0.9% bolus 1,000 mL 1,000 mL (has no administration in time range)   ondansetron injection 4 mg (4 mg Intravenous Given 1/11/24 2303)   morphine injection 4 mg (4 mg Intravenous Given 1/11/24 2345)   aluminum-magnesium hydroxide-simethicone 200-200-20 mg/5 mL suspension 30 mL (30 mLs Oral Given 1/11/24 2344)     And   LIDOcaine viscous HCl 2% oral solution 15 mL (15 mLs Oral Given 1/11/24 2344)   iohexoL (OMNIPAQUE 350) injection 80 mL (80 mLs Intravenous Given 1/12/24 0243)     Medical Decision Making  Amount and/or Complexity of Data Reviewed  Labs: ordered.  Radiology: ordered.    Risk  OTC drugs.  Prescription drug management.                          Medical Decision Making:   Initial Assessment:   58-year-old male presenting with left upper quadrant pain.  On exam he appears mildly uncomfortable.  Vitals mostly normal though with noted bradycardia.  Mild tenderness to palpation of the epigastrium and left upper quadrant.  Denies chest pain.  Differential includes pancreatitis, gallstone, gastritis, peptic ulcer disease, obstruction.  Will get labs, provide analgesia, antiemetics, reassess.  Differential Diagnosis:   Pancreatitis, gastritis, obstruction, biliary cause.  Lower abdominal cause such as diverticulitis appendicitis felt less likely.  Upper diverticulitis possible.  ED Management:  Symptoms improved.  Lab workup without definite cause.  Will get CT abdomen pelvis to help further delineate possible surgical cause.  Patient being signed out to Dr. Spencer pending CT scan results.             Clinical Impression:  Final diagnoses:  [R10.12] Left upper quadrant abdominal pain (Primary)                 Quintin Goss MD  01/12/24 2186

## 2024-01-12 NOTE — HPI
This is a 57 yo M with history of diabetes and hypertension who presented to the ED with complaints of left sided abdominal/chest wall pain. He states he has had two similar episodes over the last few weeks that resolve on their own. His pain is now significantly improved. He denies nausea/vomiting. He is afebrile, hemodynamically stable, normal bilirubin and has no leukocytosis. CT shows mild gallbladder distension with possible trace fluid and cholelithiasis.  He has not had any previous abdominal surgery.

## 2024-01-12 NOTE — SUBJECTIVE & OBJECTIVE
Past Medical History:   Diagnosis Date    Diabetes mellitus        History reviewed. No pertinent surgical history.    Review of patient's allergies indicates:  No Known Allergies    No current facility-administered medications on file prior to encounter.     Current Outpatient Medications on File Prior to Encounter   Medication Sig    alogliptin-pioglitazone 12.5-15 mg Tab Take by mouth once daily.    atorvastatin (LIPITOR) 20 MG tablet Take 20 mg by mouth once daily.    BASAGLAR KWIKPEN U-100 INSULIN glargine 100 units/mL SubQ pen SMARTSI Unit(s) SUB-Q Every Night    empagliflozin 10 mg Tab Take 10 mg by mouth once daily.    ergocalciferol (ERGOCALCIFEROL) 50,000 unit Cap Take 50,000 Units by mouth every 7 days.    insulin aspart protamine-insulin aspart (NOVOLOG 70/30) 100 unit/mL (70-30) InPn pen Inject into the skin 2 (two) times daily before meals.    insulin glargine (LANTUS) 100 unit/mL injection Inject into the skin every evening.    JANUMET -1,000 mg TM24 Take 1 tablet by mouth every morning.    losartan (COZAAR) 25 MG tablet Take 25 mg by mouth.    VEMLIDY 25 mg Tab Take 1 tablet by mouth.     Family History    None       Tobacco Use    Smoking status: Former    Smokeless tobacco: Not on file   Substance and Sexual Activity    Alcohol use: No    Drug use: No    Sexual activity: Not on file     Review of Systems   Constitutional:  Positive for appetite change. Negative for activity change.   HENT:  Negative for congestion and dental problem.    Eyes:  Negative for discharge and itching.   Respiratory:  Negative for apnea and chest tightness.    Cardiovascular:  Negative for chest pain and leg swelling.   Gastrointestinal:  Positive for abdominal pain, nausea and vomiting.   Endocrine: Negative for cold intolerance and heat intolerance.   Genitourinary:  Negative for difficulty urinating and dysuria.   Musculoskeletal:  Negative for arthralgias and back pain.   Skin:  Negative for color change and  pallor.   Allergic/Immunologic: Negative for environmental allergies and food allergies.   Neurological:  Positive for weakness.   Hematological:  Negative for adenopathy. Does not bruise/bleed easily.   Psychiatric/Behavioral:  Negative for agitation and behavioral problems.      Objective:     Vital Signs (Most Recent):  Temp: 97.8 °F (36.6 °C) (01/12/24 0808)  Pulse: 60 (01/12/24 0808)  Resp: 15 (01/12/24 0808)  BP: 125/65 (01/12/24 0808)  SpO2: 96 % (01/12/24 0808) Vital Signs (24h Range):  Temp:  [97.5 °F (36.4 °C)-97.9 °F (36.6 °C)] 97.8 °F (36.6 °C)  Pulse:  [46-66] 60  Resp:  [12-20] 15  SpO2:  [95 %-100 %] 96 %  BP: (109-151)/(55-72) 125/65     Weight: 70.3 kg (154 lb 15.4 oz)  Body mass index is 25.79 kg/m².     Physical Exam  HENT:      Head: Normocephalic.      Nose: Nose normal.      Mouth/Throat:      Mouth: Mucous membranes are moist.   Eyes:      Extraocular Movements: Extraocular movements intact.      Pupils: Pupils are equal, round, and reactive to light.   Cardiovascular:      Rate and Rhythm: Normal rate and regular rhythm.      Heart sounds: No murmur heard.  Pulmonary:      Effort: Pulmonary effort is normal.      Breath sounds: Normal breath sounds.   Abdominal:      Palpations: Abdomen is soft.      Tenderness: There is abdominal tenderness.      Comments: Mild pain in LUQ   Musculoskeletal:         General: No swelling or deformity.      Cervical back: Normal range of motion and neck supple.   Skin:     Coloration: Skin is not jaundiced.      Findings: No bruising.   Neurological:      Mental Status: He is alert and oriented to person, place, and time.      Cranial Nerves: No cranial nerve deficit.      Motor: No weakness.   Psychiatric:         Mood and Affect: Mood normal.         Behavior: Behavior normal.              CRANIAL NERVES     CN III, IV, VI   Pupils are equal, round, and reactive to light.       Significant Labs: All pertinent labs within the past 24 hours have been  reviewed.  BMP:   Recent Labs   Lab 01/11/24  2300   *      K 3.4*      CO2 25   BUN 16   CREATININE 0.8   CALCIUM 8.5*     CBC:   Recent Labs   Lab 01/11/24  2300   WBC 10.87   HGB 14.5   HCT 42.2        CMP:   Recent Labs   Lab 01/11/24  2300      K 3.4*      CO2 25   *   BUN 16   CREATININE 0.8   CALCIUM 8.5*   PROT 7.1   ALBUMIN 3.8   BILITOT 0.4   ALKPHOS 41*   AST 18   ALT 60*   ANIONGAP 10       Significant Imaging: I have reviewed all pertinent imaging results/findings within the past 24 hours.

## 2024-01-12 NOTE — H&P
"  [facility] Medicine  History & Physical    Patient Name: Malou Suarez  MRN: [id number]  Patient Class: IP- Inpatient  Admission Date: 2024  Attending Physician: Haresh De Oliveira, *   Primary Care Provider: Daniella Leal MD         Patient information was obtained from patient and ER records.     Subjective:     Principal Problem:Left upper quadrant pain    Chief Complaint:   Chief Complaint   Patient presents with    Abdominal Pain     Pt presents to the ED with c/o constant RUQ pain with n/v since this AM that feels "tight." Took motrin at 1930 and zantac at 2100 with no relief. Pt actively vomitting in triage.        HPI: 58-year-old male with a history of diabetes presenting with left upper quadrant abdominal pain associated nausea and vomiting lasting for about an hour to 2 hours.  Patient reports he had similar symptoms on .  Denies chest pain, shortness of breath, fevers, chills, diarrhea, constipation.  Notes multiple episodes of nausea and vomiting.  Reports symptoms started this morning   CT abdomen show possible cholecystitis,surgery is consulted,will have HIDA scan today.    Past Medical History:   Diagnosis Date    Diabetes mellitus        History reviewed. No pertinent surgical history.    Review of patient's allergies indicates:  No Known Allergies    No current facility-administered medications on file prior to encounter.     Current Outpatient Medications on File Prior to Encounter   Medication Sig    alogliptin-pioglitazone 12.5-15 mg Tab Take by mouth once daily.    atorvastatin (LIPITOR) 20 MG tablet Take 20 mg by mouth once daily.    BASAGLAR KWIKPEN U-100 INSULIN glargine 100 units/mL SubQ pen SMARTSI Unit(s) SUB-Q Every Night    empagliflozin 10 mg Tab Take 10 mg by mouth once daily.    ergocalciferol (ERGOCALCIFEROL) 50,000 unit Cap Take 50,000 Units by mouth every 7 days.    insulin aspart protamine-insulin aspart (NOVOLOG 70/30) 100 unit/mL (70-30) InPn " pen Inject into the skin 2 (two) times daily before meals.    insulin glargine (LANTUS) 100 unit/mL injection Inject into the skin every evening.    JANUMET -1,000 mg TM24 Take 1 tablet by mouth every morning.    losartan (COZAAR) 25 MG tablet Take 25 mg by mouth.    VEMLIDY 25 mg Tab Take 1 tablet by mouth.     Family History    None       Tobacco Use    Smoking status: Former    Smokeless tobacco: Not on file   Substance and Sexual Activity    Alcohol use: No    Drug use: No    Sexual activity: Not on file     Review of Systems   Constitutional:  Positive for appetite change. Negative for activity change.   HENT:  Negative for congestion and dental problem.    Eyes:  Negative for discharge and itching.   Respiratory:  Negative for apnea and chest tightness.    Cardiovascular:  Negative for chest pain and leg swelling.   Gastrointestinal:  Positive for abdominal pain, nausea and vomiting.   Endocrine: Negative for cold intolerance and heat intolerance.   Genitourinary:  Negative for difficulty urinating and dysuria.   Musculoskeletal:  Negative for arthralgias and back pain.   Skin:  Negative for color change and pallor.   Allergic/Immunologic: Negative for environmental allergies and food allergies.   Neurological:  Positive for weakness.   Hematological:  Negative for adenopathy. Does not bruise/bleed easily.   Psychiatric/Behavioral:  Negative for agitation and behavioral problems.      Objective:     Vital Signs (Most Recent):  Temp: 97.8 °F (36.6 °C) (01/12/24 0808)  Pulse: 60 (01/12/24 0808)  Resp: 15 (01/12/24 0808)  BP: 125/65 (01/12/24 0808)  SpO2: 96 % (01/12/24 0808) Vital Signs (24h Range):  Temp:  [97.5 °F (36.4 °C)-97.9 °F (36.6 °C)] 97.8 °F (36.6 °C)  Pulse:  [46-66] 60  Resp:  [12-20] 15  SpO2:  [95 %-100 %] 96 %  BP: (109-151)/(55-72) 125/65     Weight: 70.3 kg (154 lb 15.4 oz)  Body mass index is 25.79 kg/m².     Physical Exam  HENT:      Head: Normocephalic.      Nose: Nose normal.       Mouth/Throat:      Mouth: Mucous membranes are moist.   Eyes:      Extraocular Movements: Extraocular movements intact.      Pupils: Pupils are equal, round, and reactive to light.   Cardiovascular:      Rate and Rhythm: Normal rate and regular rhythm.      Heart sounds: No murmur heard.  Pulmonary:      Effort: Pulmonary effort is normal.      Breath sounds: Normal breath sounds.   Abdominal:      Palpations: Abdomen is soft.      Tenderness: There is abdominal tenderness.      Comments: Mild pain in LUQ   Musculoskeletal:         General: No swelling or deformity.      Cervical back: Normal range of motion and neck supple.   Skin:     Coloration: Skin is not jaundiced.      Findings: No bruising.   Neurological:      Mental Status: He is alert and oriented to person, place, and time.      Cranial Nerves: No cranial nerve deficit.      Motor: No weakness.   Psychiatric:         Mood and Affect: Mood normal.         Behavior: Behavior normal.              CRANIAL NERVES     CN III, IV, VI   Pupils are equal, round, and reactive to light.       Significant Labs: All pertinent labs within the past 24 hours have been reviewed.  BMP:   Recent Labs   Lab 01/11/24  2300   *      K 3.4*      CO2 25   BUN 16   CREATININE 0.8   CALCIUM 8.5*     CBC:   Recent Labs   Lab 01/11/24  2300   WBC 10.87   HGB 14.5   HCT 42.2        CMP:   Recent Labs   Lab 01/11/24  2300      K 3.4*      CO2 25   *   BUN 16   CREATININE 0.8   CALCIUM 8.5*   PROT 7.1   ALBUMIN 3.8   BILITOT 0.4   ALKPHOS 41*   AST 18   ALT 60*   ANIONGAP 10       Significant Imaging: I have reviewed all pertinent imaging results/findings within the past 24 hours.  Assessment/Plan:     * Left upper quadrant pain    CT abdomen show possible cholecystitis,surgery is consulted,will have HIDA scan today.,Nausea,vomiting is resolved already       VTE Risk Mitigation (From admission, onward)      None                        AdmissionCare    Guideline: Gallbladder / Bile Duct Inflammation or Stone - INPT, Inpatient    Based on the indications selected for the patient, the bed status of Admit to Inpatient was determined to be MET    The following indications were selected as present at the time of evaluation of the patient:      Acute cholangitis, as indicated by ALL of the following:   -     Systemic signs of inflammation, as indicated by 1 or more of the following:    -      - White blood cell count greater than 10,000/mm3 (10 x109/L) or less than 4000/mm3 (4 x109/L)    Evidence of common bile duct disease, as indicated by 1 or more of the following:    -      - Hepatobiliary imaging showing biliary dilation or evidence of etiology (eg, stricture, stone, previously placed stent)    - Right upper quadrant pain, mass, or tenderness    AdmissionCare documentation entered by: LOLA Timmons    [facility], 27th edition, Copyright © 2023 [facility], [facility] All Rights Reserved.  [id number]    Haresh De Oliveira MD  Department of Hospital Medicine  [facility] [place] [facility] S[facility]

## 2024-01-12 NOTE — PROVIDER PROGRESS NOTES - EMERGENCY DEPT.
Encounter Date: 1/11/2024    ED Physician Progress Notes        Physician Note:   Care of this patient was received by me (Dr. Spencer) at shift change from Dr. Goss pending CT abdomen results, reassessment and disposition.    Course of ED stay:   1. Cardiovascular-patient has been hemodynamically stable and chest pain-free throughout ED stay.  Troponin was normal.  EKG shows no acute disease.  2. Pulmonary-patient has had normal O2 sats on room air and no signs or symptoms of respiratory distress throughout ED stay.    3. Hematology/infectious disease-patient has been afebrile and CBC is reassuring.  Urinalysis shows no evidence of infection  4. GI/nutrition/renal/endocrine-CMP is reassuring except for mild hypokalemia.  Patient received normal saline bolus and Zofran in the ED.  Nausea and vomiting improved after medications and fluids.  CT of the abdomen and pelvis was ordered and reveals acute cholecystitis with 5 mm calculus at the gallbladder neck.  Plan is to admit to general surgery for further evaluation/treatment of cholecystitis.

## 2024-01-12 NOTE — HPI
58-year-old male with a history of diabetes presenting with left upper quadrant abdominal pain associated nausea and vomiting lasting for about an hour to 2 hours.  Patient reports he had similar symptoms on Sunday.  Denies chest pain, shortness of breath, fevers, chills, diarrhea, constipation.  Notes multiple episodes of nausea and vomiting.  Reports symptoms started this morning. CT abdomen show possible cholecystitis, surgery is consulted,will have HIDA scan today.

## 2024-01-13 ENCOUNTER — HOSPITAL ENCOUNTER (INPATIENT)
Facility: HOSPITAL | Age: 59
LOS: 2 days | Discharge: HOME HEALTH | DRG: 418 | End: 2024-01-18
Attending: EMERGENCY MEDICINE | Admitting: HOSPITALIST
Payer: COMMERCIAL

## 2024-01-13 VITALS
RESPIRATION RATE: 18 BRPM | BODY MASS INDEX: 25.81 KG/M2 | HEART RATE: 58 BPM | SYSTOLIC BLOOD PRESSURE: 131 MMHG | DIASTOLIC BLOOD PRESSURE: 68 MMHG | HEIGHT: 65 IN | TEMPERATURE: 98 F | OXYGEN SATURATION: 95 % | WEIGHT: 154.94 LBS

## 2024-01-13 DIAGNOSIS — M54.6 ACUTE LEFT-SIDED THORACIC BACK PAIN: ICD-10-CM

## 2024-01-13 DIAGNOSIS — R52 INTRACTABLE PAIN: ICD-10-CM

## 2024-01-13 DIAGNOSIS — M51.34 THORACIC DEGENERATIVE DISC DISEASE: ICD-10-CM

## 2024-01-13 DIAGNOSIS — K81.0 CHOLECYSTITIS, ACUTE: Primary | ICD-10-CM

## 2024-01-13 DIAGNOSIS — R07.9 CHEST PAIN: ICD-10-CM

## 2024-01-13 PROBLEM — R19.5 POSITIVE COLORECTAL CANCER SCREENING USING COLOGUARD TEST: Status: ACTIVE | Noted: 2024-01-13

## 2024-01-13 LAB
ALBUMIN SERPL BCP-MCNC: 3.3 G/DL (ref 3.5–5.2)
ALBUMIN SERPL BCP-MCNC: 3.8 G/DL (ref 3.5–5.2)
ALP SERPL-CCNC: 37 U/L (ref 55–135)
ALP SERPL-CCNC: 41 U/L (ref 55–135)
ALT SERPL W/O P-5'-P-CCNC: 35 U/L (ref 10–44)
ALT SERPL W/O P-5'-P-CCNC: 40 U/L (ref 10–44)
ANION GAP SERPL CALC-SCNC: 11 MMOL/L (ref 8–16)
ANION GAP SERPL CALC-SCNC: 4 MMOL/L (ref 8–16)
AST SERPL-CCNC: 14 U/L (ref 10–40)
AST SERPL-CCNC: 15 U/L (ref 10–40)
BASOPHILS # BLD AUTO: 0.03 K/UL (ref 0–0.2)
BASOPHILS # BLD AUTO: 0.03 K/UL (ref 0–0.2)
BASOPHILS NFR BLD: 0.3 % (ref 0–1.9)
BASOPHILS NFR BLD: 0.4 % (ref 0–1.9)
BILIRUB SERPL-MCNC: 0.6 MG/DL (ref 0.1–1)
BILIRUB SERPL-MCNC: 0.8 MG/DL (ref 0.1–1)
BUN SERPL-MCNC: 16 MG/DL (ref 6–20)
BUN SERPL-MCNC: 17 MG/DL (ref 6–20)
CALCIUM SERPL-MCNC: 8.2 MG/DL (ref 8.7–10.5)
CALCIUM SERPL-MCNC: 8.7 MG/DL (ref 8.7–10.5)
CHLORIDE SERPL-SCNC: 105 MMOL/L (ref 95–110)
CHLORIDE SERPL-SCNC: 108 MMOL/L (ref 95–110)
CO2 SERPL-SCNC: 22 MMOL/L (ref 23–29)
CO2 SERPL-SCNC: 25 MMOL/L (ref 23–29)
CREAT SERPL-MCNC: 0.7 MG/DL (ref 0.5–1.4)
CREAT SERPL-MCNC: 0.8 MG/DL (ref 0.5–1.4)
D DIMER PPP IA.FEU-MCNC: 0.5 MG/L FEU
DIFFERENTIAL METHOD BLD: ABNORMAL
DIFFERENTIAL METHOD BLD: ABNORMAL
EOSINOPHIL # BLD AUTO: 0.1 K/UL (ref 0–0.5)
EOSINOPHIL # BLD AUTO: 0.2 K/UL (ref 0–0.5)
EOSINOPHIL NFR BLD: 1 % (ref 0–8)
EOSINOPHIL NFR BLD: 2.9 % (ref 0–8)
ERYTHROCYTE [DISTWIDTH] IN BLOOD BY AUTOMATED COUNT: 12.8 % (ref 11.5–14.5)
ERYTHROCYTE [DISTWIDTH] IN BLOOD BY AUTOMATED COUNT: 13.2 % (ref 11.5–14.5)
EST. GFR  (NO RACE VARIABLE): >60 ML/MIN/1.73 M^2
EST. GFR  (NO RACE VARIABLE): >60 ML/MIN/1.73 M^2
GLUCOSE SERPL-MCNC: 233 MG/DL (ref 70–110)
GLUCOSE SERPL-MCNC: 242 MG/DL (ref 70–110)
HCT VFR BLD AUTO: 40.8 % (ref 40–54)
HCT VFR BLD AUTO: 44.3 % (ref 40–54)
HGB BLD-MCNC: 13.4 G/DL (ref 14–18)
HGB BLD-MCNC: 14.7 G/DL (ref 14–18)
IMM GRANULOCYTES # BLD AUTO: 0.03 K/UL (ref 0–0.04)
IMM GRANULOCYTES # BLD AUTO: 0.04 K/UL (ref 0–0.04)
IMM GRANULOCYTES NFR BLD AUTO: 0.4 % (ref 0–0.5)
IMM GRANULOCYTES NFR BLD AUTO: 0.4 % (ref 0–0.5)
LIPASE SERPL-CCNC: 33 U/L (ref 4–60)
LYMPHOCYTES # BLD AUTO: 1.7 K/UL (ref 1–4.8)
LYMPHOCYTES # BLD AUTO: 2.3 K/UL (ref 1–4.8)
LYMPHOCYTES NFR BLD: 20.9 % (ref 18–48)
LYMPHOCYTES NFR BLD: 22.1 % (ref 18–48)
MAGNESIUM SERPL-MCNC: 1.5 MG/DL (ref 1.6–2.6)
MCH RBC QN AUTO: 28.5 PG (ref 27–31)
MCH RBC QN AUTO: 29.1 PG (ref 27–31)
MCHC RBC AUTO-ENTMCNC: 32.8 G/DL (ref 32–36)
MCHC RBC AUTO-ENTMCNC: 33.2 G/DL (ref 32–36)
MCV RBC AUTO: 86 FL (ref 82–98)
MCV RBC AUTO: 89 FL (ref 82–98)
MONOCYTES # BLD AUTO: 0.5 K/UL (ref 0.3–1)
MONOCYTES # BLD AUTO: 0.6 K/UL (ref 0.3–1)
MONOCYTES NFR BLD: 5.6 % (ref 4–15)
MONOCYTES NFR BLD: 6.7 % (ref 4–15)
NEUTROPHILS # BLD AUTO: 5.1 K/UL (ref 1.8–7.7)
NEUTROPHILS # BLD AUTO: 8 K/UL (ref 1.8–7.7)
NEUTROPHILS NFR BLD: 67.5 % (ref 38–73)
NEUTROPHILS NFR BLD: 71.8 % (ref 38–73)
NRBC BLD-RTO: 0 /100 WBC
NRBC BLD-RTO: 0 /100 WBC
PHOSPHATE SERPL-MCNC: 3.1 MG/DL (ref 2.7–4.5)
PLATELET # BLD AUTO: 198 K/UL (ref 150–450)
PLATELET # BLD AUTO: 215 K/UL (ref 150–450)
PMV BLD AUTO: 10 FL (ref 9.2–12.9)
PMV BLD AUTO: 9.9 FL (ref 9.2–12.9)
POCT GLUCOSE: 185 MG/DL (ref 70–110)
POCT GLUCOSE: 250 MG/DL (ref 70–110)
POCT GLUCOSE: 91 MG/DL (ref 70–110)
POTASSIUM SERPL-SCNC: 3.7 MMOL/L (ref 3.5–5.1)
POTASSIUM SERPL-SCNC: 3.8 MMOL/L (ref 3.5–5.1)
PROT SERPL-MCNC: 6.2 G/DL (ref 6–8.4)
PROT SERPL-MCNC: 7.5 G/DL (ref 6–8.4)
RBC # BLD AUTO: 4.61 M/UL (ref 4.6–6.2)
RBC # BLD AUTO: 5.15 M/UL (ref 4.6–6.2)
SODIUM SERPL-SCNC: 137 MMOL/L (ref 136–145)
SODIUM SERPL-SCNC: 138 MMOL/L (ref 136–145)
TROPONIN I SERPL DL<=0.01 NG/ML-MCNC: <0.006 NG/ML (ref 0–0.03)
WBC # BLD AUTO: 11.18 K/UL (ref 3.9–12.7)
WBC # BLD AUTO: 7.6 K/UL (ref 3.9–12.7)

## 2024-01-13 PROCEDURE — 25000003 PHARM REV CODE 250: Performed by: PHYSICIAN ASSISTANT

## 2024-01-13 PROCEDURE — 83690 ASSAY OF LIPASE: CPT | Performed by: EMERGENCY MEDICINE

## 2024-01-13 PROCEDURE — 80053 COMPREHEN METABOLIC PANEL: CPT | Performed by: STUDENT IN AN ORGANIZED HEALTH CARE EDUCATION/TRAINING PROGRAM

## 2024-01-13 PROCEDURE — 36415 COLL VENOUS BLD VENIPUNCTURE: CPT | Performed by: STUDENT IN AN ORGANIZED HEALTH CARE EDUCATION/TRAINING PROGRAM

## 2024-01-13 PROCEDURE — 82378 CARCINOEMBRYONIC ANTIGEN: CPT | Performed by: STUDENT IN AN ORGANIZED HEALTH CARE EDUCATION/TRAINING PROGRAM

## 2024-01-13 PROCEDURE — 25000003 PHARM REV CODE 250: Performed by: HOSPITALIST

## 2024-01-13 PROCEDURE — 96375 TX/PRO/DX INJ NEW DRUG ADDON: CPT

## 2024-01-13 PROCEDURE — 96361 HYDRATE IV INFUSION ADD-ON: CPT

## 2024-01-13 PROCEDURE — 99233 SBSQ HOSP IP/OBS HIGH 50: CPT | Mod: ,,, | Performed by: INTERNAL MEDICINE

## 2024-01-13 PROCEDURE — 84484 ASSAY OF TROPONIN QUANT: CPT | Performed by: EMERGENCY MEDICINE

## 2024-01-13 PROCEDURE — G0378 HOSPITAL OBSERVATION PER HR: HCPCS

## 2024-01-13 PROCEDURE — 84100 ASSAY OF PHOSPHORUS: CPT | Performed by: STUDENT IN AN ORGANIZED HEALTH CARE EDUCATION/TRAINING PROGRAM

## 2024-01-13 PROCEDURE — 99285 EMERGENCY DEPT VISIT HI MDM: CPT | Mod: 25

## 2024-01-13 PROCEDURE — 85379 FIBRIN DEGRADATION QUANT: CPT | Performed by: EMERGENCY MEDICINE

## 2024-01-13 PROCEDURE — 85025 COMPLETE CBC W/AUTO DIFF WBC: CPT | Performed by: STUDENT IN AN ORGANIZED HEALTH CARE EDUCATION/TRAINING PROGRAM

## 2024-01-13 PROCEDURE — 25000003 PHARM REV CODE 250: Performed by: EMERGENCY MEDICINE

## 2024-01-13 PROCEDURE — 96376 TX/PRO/DX INJ SAME DRUG ADON: CPT

## 2024-01-13 PROCEDURE — 80053 COMPREHEN METABOLIC PANEL: CPT | Mod: 91 | Performed by: EMERGENCY MEDICINE

## 2024-01-13 PROCEDURE — 25000003 PHARM REV CODE 250: Performed by: STUDENT IN AN ORGANIZED HEALTH CARE EDUCATION/TRAINING PROGRAM

## 2024-01-13 PROCEDURE — 96365 THER/PROPH/DIAG IV INF INIT: CPT

## 2024-01-13 PROCEDURE — 93010 ELECTROCARDIOGRAM REPORT: CPT | Mod: ,,, | Performed by: INTERNAL MEDICINE

## 2024-01-13 PROCEDURE — 63600175 PHARM REV CODE 636 W HCPCS: Mod: JZ,JG | Performed by: EMERGENCY MEDICINE

## 2024-01-13 PROCEDURE — 93005 ELECTROCARDIOGRAM TRACING: CPT

## 2024-01-13 PROCEDURE — 63600175 PHARM REV CODE 636 W HCPCS: Mod: JZ,JG

## 2024-01-13 PROCEDURE — 83735 ASSAY OF MAGNESIUM: CPT | Performed by: STUDENT IN AN ORGANIZED HEALTH CARE EDUCATION/TRAINING PROGRAM

## 2024-01-13 PROCEDURE — 63600175 PHARM REV CODE 636 W HCPCS: Performed by: STUDENT IN AN ORGANIZED HEALTH CARE EDUCATION/TRAINING PROGRAM

## 2024-01-13 PROCEDURE — 85025 COMPLETE CBC W/AUTO DIFF WBC: CPT | Mod: 91 | Performed by: EMERGENCY MEDICINE

## 2024-01-13 PROCEDURE — 96366 THER/PROPH/DIAG IV INF ADDON: CPT

## 2024-01-13 PROCEDURE — 25500020 PHARM REV CODE 255: Performed by: EMERGENCY MEDICINE

## 2024-01-13 RX ORDER — GLUCAGON 1 MG
1 KIT INJECTION
Status: DISCONTINUED | OUTPATIENT
Start: 2024-01-14 | End: 2024-01-13

## 2024-01-13 RX ORDER — INSULIN ASPART 100 [IU]/ML
0-5 INJECTION, SOLUTION INTRAVENOUS; SUBCUTANEOUS
Status: DISCONTINUED | OUTPATIENT
Start: 2024-01-13 | End: 2024-01-13

## 2024-01-13 RX ORDER — ACETAMINOPHEN 325 MG/1
650 TABLET ORAL EVERY 4 HOURS PRN
Status: DISCONTINUED | OUTPATIENT
Start: 2024-01-13 | End: 2024-01-15

## 2024-01-13 RX ORDER — ASPIRIN 81 MG/1
81 TABLET ORAL DAILY
Qty: 90 TABLET | Refills: 3 | Status: ON HOLD | OUTPATIENT
Start: 2024-01-13 | End: 2024-01-18 | Stop reason: HOSPADM

## 2024-01-13 RX ORDER — MORPHINE SULFATE 4 MG/ML
4 INJECTION, SOLUTION INTRAMUSCULAR; INTRAVENOUS ONCE
Status: COMPLETED | OUTPATIENT
Start: 2024-01-13 | End: 2024-01-13

## 2024-01-13 RX ORDER — IBUPROFEN 200 MG
24 TABLET ORAL
Status: DISCONTINUED | OUTPATIENT
Start: 2024-01-13 | End: 2024-01-18 | Stop reason: HOSPADM

## 2024-01-13 RX ORDER — NALOXONE HCL 0.4 MG/ML
0.02 VIAL (ML) INJECTION
Status: DISCONTINUED | OUTPATIENT
Start: 2024-01-13 | End: 2024-01-18 | Stop reason: HOSPADM

## 2024-01-13 RX ORDER — MORPHINE SULFATE 4 MG/ML
4 INJECTION, SOLUTION INTRAMUSCULAR; INTRAVENOUS
Status: COMPLETED | OUTPATIENT
Start: 2024-01-13 | End: 2024-01-13

## 2024-01-13 RX ORDER — MAGNESIUM SULFATE HEPTAHYDRATE 40 MG/ML
2 INJECTION, SOLUTION INTRAVENOUS ONCE
Status: COMPLETED | OUTPATIENT
Start: 2024-01-13 | End: 2024-01-13

## 2024-01-13 RX ORDER — TRAMADOL HYDROCHLORIDE 50 MG/1
100 TABLET ORAL ONCE
Status: COMPLETED | OUTPATIENT
Start: 2024-01-13 | End: 2024-01-13

## 2024-01-13 RX ORDER — ONDANSETRON HYDROCHLORIDE 2 MG/ML
4 INJECTION, SOLUTION INTRAVENOUS EVERY 8 HOURS PRN
Status: DISCONTINUED | OUTPATIENT
Start: 2024-01-13 | End: 2024-01-18 | Stop reason: HOSPADM

## 2024-01-13 RX ORDER — GLUCAGON 1 MG
1 KIT INJECTION
Status: DISCONTINUED | OUTPATIENT
Start: 2024-01-13 | End: 2024-01-18 | Stop reason: HOSPADM

## 2024-01-13 RX ORDER — IBUPROFEN 200 MG
16 TABLET ORAL
Status: DISCONTINUED | OUTPATIENT
Start: 2024-01-13 | End: 2024-01-18 | Stop reason: HOSPADM

## 2024-01-13 RX ORDER — SODIUM CHLORIDE 0.9 % (FLUSH) 0.9 %
10 SYRINGE (ML) INJECTION EVERY 12 HOURS PRN
Status: DISCONTINUED | OUTPATIENT
Start: 2024-01-13 | End: 2024-01-18 | Stop reason: HOSPADM

## 2024-01-13 RX ORDER — KETOROLAC TROMETHAMINE 30 MG/ML
15 INJECTION, SOLUTION INTRAMUSCULAR; INTRAVENOUS
Status: COMPLETED | OUTPATIENT
Start: 2024-01-13 | End: 2024-01-13

## 2024-01-13 RX ORDER — INSULIN ASPART 100 [IU]/ML
0-10 INJECTION, SOLUTION INTRAVENOUS; SUBCUTANEOUS EVERY 6 HOURS PRN
Status: DISCONTINUED | OUTPATIENT
Start: 2024-01-14 | End: 2024-01-18 | Stop reason: HOSPADM

## 2024-01-13 RX ORDER — PROCHLORPERAZINE EDISYLATE 5 MG/ML
5 INJECTION INTRAMUSCULAR; INTRAVENOUS EVERY 6 HOURS PRN
Status: DISCONTINUED | OUTPATIENT
Start: 2024-01-13 | End: 2024-01-18 | Stop reason: HOSPADM

## 2024-01-13 RX ORDER — CYCLOBENZAPRINE HCL 10 MG
10 TABLET ORAL
Status: COMPLETED | OUTPATIENT
Start: 2024-01-13 | End: 2024-01-13

## 2024-01-13 RX ORDER — TALC
6 POWDER (GRAM) TOPICAL NIGHTLY PRN
Status: DISCONTINUED | OUTPATIENT
Start: 2024-01-13 | End: 2024-01-18 | Stop reason: HOSPADM

## 2024-01-13 RX ORDER — ATORVASTATIN CALCIUM 40 MG/1
40 TABLET, FILM COATED ORAL DAILY
Qty: 90 TABLET | Refills: 3 | Status: SHIPPED | OUTPATIENT
Start: 2024-01-13 | End: 2025-01-12

## 2024-01-13 RX ORDER — TRAMADOL HYDROCHLORIDE 50 MG/1
50 TABLET ORAL EVERY 8 HOURS PRN
Qty: 20 TABLET | Refills: 0 | Status: ON HOLD | OUTPATIENT
Start: 2024-01-13 | End: 2024-01-18 | Stop reason: HOSPADM

## 2024-01-13 RX ADMIN — KETOROLAC TROMETHAMINE 15 MG: 30 INJECTION INTRAMUSCULAR; INTRAVENOUS at 06:01

## 2024-01-13 RX ADMIN — MORPHINE SULFATE 4 MG: 4 INJECTION, SOLUTION INTRAMUSCULAR; INTRAVENOUS at 11:01

## 2024-01-13 RX ADMIN — MORPHINE SULFATE 4 MG: 4 INJECTION, SOLUTION INTRAMUSCULAR; INTRAVENOUS at 09:01

## 2024-01-13 RX ADMIN — ACETAMINOPHEN 650 MG: 325 TABLET ORAL at 11:01

## 2024-01-13 RX ADMIN — SODIUM CHLORIDE: 9 INJECTION, SOLUTION INTRAVENOUS at 03:01

## 2024-01-13 RX ADMIN — CYCLOBENZAPRINE 10 MG: 10 TABLET, FILM COATED ORAL at 06:01

## 2024-01-13 RX ADMIN — IOHEXOL 75 ML: 350 INJECTION, SOLUTION INTRAVENOUS at 07:01

## 2024-01-13 RX ADMIN — TRAMADOL HYDROCHLORIDE 100 MG: 50 TABLET, COATED ORAL at 03:01

## 2024-01-13 RX ADMIN — MAGNESIUM SULFATE HEPTAHYDRATE 2 G: 40 INJECTION, SOLUTION INTRAVENOUS at 01:01

## 2024-01-13 RX ADMIN — MORPHINE SULFATE 4 MG: 4 INJECTION, SOLUTION INTRAMUSCULAR; INTRAVENOUS at 06:01

## 2024-01-13 NOTE — DISCHARGE INSTRUCTIONS
Dùng aspirin 81 mg m?i ngày và atorvastatin 40 mg vô th?i h?n   Tôi c?ng ?ã g?i m?t s? thu?c gi?m ?au ??n hi?u thu?c c?a b?n   ??t l?ch n?i soi ??i tràng, tôi ?ã ??t hàng nên b?n ch? c?n ch?n th?i wojciech trên l?ch   S? s?m c?n m?t bài ki?m tra c?ng th?ng   Hubert dõi v?i PCP, Richi m?ch, GI và Ph?u thu?t t?ng quát   Nghe có v? h?i ?au c? x??ng kh?p bên s??n trái, yêu c?u v?t lý tr? li?u quinn?i trú        C?m ?n b?n ?ã tin t??ng B?nh vi?n [facility] và tôi v?i s? ch?m sóc c?a b?n. David evans r?t victorino d? khi ???c b?n giao phó noah c?u ch?m sóc s?c kh?e c?a b?n cho chúng tôi. S? hài lòng c?a b?n r?t malou tr?ng ??i v?i david tôso evans hy v?ng b?n r?t hài lòng v?i tr?i candida?m c?a mình t[facility] Lissa khi schulz?t vi?n, b?n có th? nh?n ???c m?t b?n kh?o sát yêu c?u b?n ?ánh giá tr?i candida?m c?a mình t?i b?nh vi?n. David evans khuy?n khích b?n dành th?i wojciech villegas thành b?n kh?o sát vì ph?n h?i c?a b?n cho phép david evans xác ??nh các l?nh v?c c?n c?i thi?n c?ng nh? ghi nh?n s? malou tâm ch?m sóc c?a nhân viên david richardi. David tôi hy v?ng r?ng b?n ?ã nh?n ???c s? ch?m sóc t?t nh?t có th? andrés th?i wojciech n?m vi?n t?i [facility], vì s? hài lòng c?a b?n là ?u tiên hàng ??u c?a chúng tôi.    Hãy cho tôi bi?t n?u tôi có th? làm gì thêm!!         Tello Ng MD  Oral d? Y h?c N?i jose luis ???c H?i ??ng Ch?ng nh?n  Tr??ng jose luis Y b?nh vi?n            Take aspirin 81 mg daily and atorvastatin 40 mg indefinitely  I sent some pain medication to your pharmacy as well  Get Colonoscopy scheduled, I placed the order so you can just pick a time with the   Will need a stress test soon  Follow-up with PCP, Cardiology, GI and General surgery  Sounds like a bit of musculoskeletal pain on left flank, outpatient physical therapy ordered        Thank you for trusting [facility] and me with your care.  We are honored that you entrusted us with your healthcare needs. Your satisfaction is very important to us  and we hope you have been very pleased with your experience at [facility]. After your discharge you may receive a survey asking you to rate your hospital experience. We encourage you to take the time to complete the survey as your feedback allows us to identify areas for improvement as well as recognize our staff for their care. We hope that you have received the very best care possible during your hospitalization at [facility], as your satisfaction is our top priority.    Let me know if there is anything more I can do!!        Tello Ng MD  Board-Certified Internal Medicine Attending  Section Head of Hospital Medicine      PATIENT GENERAL DISCHARGE INFORMATION   Things that YOU are responsible for to Manage Your Care At Home:  1. Getting your prescriptions filled.  2. Taking you medications as directed. (DO NOT MISS ANY DOSES!)  3. Going to your follow-up doctor appointments.                 *This is important because it allows the doctor to monitor your progress and make changes.      If you are unable to make your follow up appointments, please call the number listed and reschedule this appointment.   After discharge, if you need assistance, you can call [facility] On Call Nurse Care Line for 24/7 assistance at [phone]  If you are experience any signs or symptoms, Call your doctor or Call [phone] and come to your nearest Emergency Room.    You should receive a call from [facility] Discharge [facility] within 48-72 hours to help manage your care after discharge.   Please try to make sure that you answer your phone for this important phone call.

## 2024-01-13 NOTE — PROGRESS NOTES
[facility] Surg  Cardiology  Progress Note    Patient Name: Malou Suarez  MRN: [id number]  Admission Date: 2024  Hospital Length of Stay: 1 days  Code Status: No Order   Attending Physician: Tello Ng MD   Primary Care Physician: Daniella Leal MD  Expected Discharge Date: 2024  Principal Problem:Left upper quadrant pain    Subjective:       Interval History:  No more chest pains.  Now complaining of back pain, left-sided.     Past Medical History:   Diagnosis Date    Diabetes mellitus        History reviewed. No pertinent surgical history.    Review of patient's allergies indicates:  No Known Allergies    No current facility-administered medications on file prior to encounter.     Current Outpatient Medications on File Prior to Encounter   Medication Sig    alogliptin-pioglitazone 12.5-15 mg Tab Take by mouth once daily.    BASAGLAR KWIKPEN U-100 INSULIN glargine 100 units/mL SubQ pen SMARTSI Unit(s) SUB-Q Every Night    empagliflozin 10 mg Tab Take 10 mg by mouth once daily.    ergocalciferol (ERGOCALCIFEROL) 50,000 unit Cap Take 50,000 Units by mouth every 7 days.    insulin aspart protamine-insulin aspart (NOVOLOG 70/30) 100 unit/mL (70-30) InPn pen Inject into the skin 2 (two) times daily before meals.    insulin glargine (LANTUS) 100 unit/mL injection Inject into the skin every evening.    JANUMET -1,000 mg TM24 Take 1 tablet by mouth every morning.    losartan (COZAAR) 25 MG tablet Take 25 mg by mouth.    VEMLIDY 25 mg Tab Take 1 tablet by mouth.    [DISCONTINUED] atorvastatin (LIPITOR) 20 MG tablet Take 20 mg by mouth once daily.     Family History    None       Tobacco Use    Smoking status: Former    Smokeless tobacco: Not on file   Substance and Sexual Activity    Alcohol use: No    Drug use: No    Sexual activity: Not on file     Review of Systems   Constitutional: Negative.   HENT: Negative.     Eyes: Negative.    Respiratory: Negative.     Endocrine: Negative.     Hematologic/Lymphatic: Negative.    Skin: Negative.    Musculoskeletal: Negative.    Gastrointestinal: Negative.    Genitourinary: Negative.    Neurological: Negative.    Psychiatric/Behavioral: Negative.     Allergic/Immunologic: Negative.      Objective:     Vital Signs (Most Recent):  Temp: 97.8 °F (36.6 °C) (01/13/24 1126)  Pulse: (!) 58 (01/13/24 1126)  Resp: 18 (01/13/24 1506)  BP: 131/68 (01/13/24 1126)  SpO2: 95 % (01/13/24 1126) Vital Signs (24h Range):  Temp:  [97.8 °F (36.6 °C)-98.4 °F (36.9 °C)] 97.8 °F (36.6 °C)  Pulse:  [56-61] 58  Resp:  [16-18] 18  SpO2:  [95 %-99 %] 95 %  BP: (114-154)/(56-72) 131/68     Weight: 70.3 kg (154 lb 15.4 oz)  Body mass index is 25.79 kg/m².    SpO2: 95 %         Intake/Output Summary (Last 24 hours) at 1/13/2024 1618  Last data filed at 1/13/2024 1300  Gross per 24 hour   Intake 720 ml   Output --   Net 720 ml         Lines/Drains/Airways       Peripheral Intravenous Line  Duration                  Peripheral IV - Single Lumen 01/12/24 0328 20 G Posterior;Right Hand 1 day                     Physical Exam  Vitals reviewed.   Constitutional:       Appearance: He is well-developed.   HENT:      Head: Normocephalic.   Eyes:      Conjunctiva/sclera: Conjunctivae normal.      Pupils: Pupils are equal, round, and reactive to light.   Cardiovascular:      Rate and Rhythm: Normal rate and regular rhythm.      Heart sounds: Normal heart sounds.   Pulmonary:      Effort: Pulmonary effort is normal.      Breath sounds: Normal breath sounds.   Abdominal:      General: Bowel sounds are normal.      Palpations: Abdomen is soft.   Musculoskeletal:      Cervical back: Normal range of motion and neck supple.   Skin:     General: Skin is warm.   Neurological:      Mental Status: He is alert and oriented to person, place, and time.          Significant Labs: BMP:   Recent Labs   Lab 01/11/24  2300 01/13/24  0932   * 233*    137   K 3.4* 3.7    108   CO2 25 25   BUN 16  "16   CREATININE 0.8 0.7   CALCIUM 8.5* 8.2*   MG  --  1.5*     , CMP   Recent Labs   Lab 01/11/24  2300 01/13/24  0932    137   K 3.4* 3.7    108   CO2 25 25   * 233*   BUN 16 16   CREATININE 0.8 0.7   CALCIUM 8.5* 8.2*   PROT 7.1 6.2   ALBUMIN 3.8 3.3*   BILITOT 0.4 0.8   ALKPHOS 41* 37*   AST 18 14   ALT 60* 35   ANIONGAP 10 4*     , CBC   Recent Labs   Lab 01/11/24  2300 01/13/24  0932   WBC 10.87 7.60   HGB 14.5 13.4*   HCT 42.2 40.8    198     , INR No results for input(s): "INR", "PROTIME" in the last 48 hours., Lipid Panel No results for input(s): "CHOL", "HDL", "LDLCALC", "TRIG", "CHOLHDL" in the last 48 hours., Troponin   Recent Labs   Lab 01/11/24 2300 01/12/24  1220   TROPONINI 0.006 <0.006     , and All pertinent lab results from the last 24 hours have been reviewed.    Significant Imaging: Echocardiogram: Transthoracic echo (TTE) complete (Cupid Only):   Results for orders placed or performed during the hospital encounter of 01/11/24   Echo   Result Value Ref Range    BSA 1.8 m2    LVOT stroke volume 77.64 cm3    LVIDd 5.10 3.5 - 6.0 cm    LV Systolic Volume 53.02 mL    LV Systolic Volume Index 30.0 mL/m2    LVIDs 3.56 2.1 - 4.0 cm    LV Diastolic Volume 123.62 mL    LV Diastolic Volume Index 69.84 mL/m2    IVS 0.90 0.6 - 1.1 cm    LVOT diameter 2.13 cm    LVOT area 3.6 cm2    FS 30 28 - 44 %    Left Ventricle Relative Wall Thickness 0.35 cm    Posterior Wall 0.88 0.6 - 1.1 cm    LV mass 161.18 g    LV Mass Index 91 g/m2    MV Peak E Rafiq 0.77 m/s    TDI LATERAL 0.10 m/s    TDI SEPTAL 0.05 m/s    E/E' ratio 10.27 m/s    MV Peak A Rafiq 0.74 m/s    TR Max Rafiq 1.73 m/s    E/A ratio 1.04     IVRT 111.32 msec    E wave deceleration time 194.17 msec    LV SEPTAL E/E' RATIO 15.40 m/s    LV LATERAL E/E' RATIO 7.70 m/s    PV Peak S Rafiq 0.44 m/s    PV Peak D Rafiq 0.45 m/s    Pulm vein S/D ratio 0.98     LVOT peak rafiq 0.95 m/s    Left Ventricular Outflow Tract Mean Velocity 0.61 cm/s    " Left Ventricular Outflow Tract Mean Gradient 1.78 mmHg    RVDD 4.50 cm    TAPSE 1.82 cm    LA size 3.90 cm    Left Atrium Minor Axis 4.28 cm    RA Major Axis 4.86 cm    RA Width 4.29 cm    AV mean gradient 4 mmHg    AV peak gradient 6 mmHg    Ao peak aleksey 1.27 m/s    Ao VTI 30.70 cm    LVOT peak VTI 21.80 cm    AV valve area 2.53 cm²    AV Velocity Ratio 0.75     AV index (prosthetic) 0.71     HAYLEE by Velocity Ratio 2.66 cm²    MV stenosis pressure 1/2 time 56.31 ms    MV valve area p 1/2 method 3.91 cm2    TV peak gradient 1 mmHg    Triscuspid Valve Regurgitation Peak Gradient 12 mmHg    PV PEAK VELOCITY 0.92 m/s    PV peak gradient 3 mmHg    Sinus 3.24 cm    STJ 2.70 cm    Ascending aorta 3.31 cm    IVC diameter 1.45 cm    Mean e' 0.08 m/s    ZLVIDS 1.29     ZLVIDD 0.41     LA Volume Index 37.1 mL/m2    LA volume 65.74 cm3    Left Atrium Major Axis 5.0 cm    LA WIDTH 4.3 cm    TV resting pulmonary artery pressure 15 mmHg    RV TB RVSP 5 mmHg    Est. RA pres 3 mmHg    Narrative      Left Ventricle: The left ventricle is normal in size. Normal wall   thickness. Normal wall motion. There is normal systolic function with a   visually estimated ejection fraction of 55 - 60%. Grade I diastolic   dysfunction.    Right Ventricle: Normal right ventricular cavity size. Systolic   function is normal.    Left Atrium: Left atrium is moderately dilated.    Aortic Valve: The aortic valve is a trileaflet valve.    Mitral Valve: There is mild regurgitation.    Tricuspid Valve: There is mild regurgitation.    Pulmonary Artery: The estimated pulmonary artery systolic pressure is   15 mmHg.    IVC/SVC: Normal venous pressure at 3 mmHg.           Assessment and Plan:     Brief HPI:     * Left upper quadrant pain  Rx per primary / surgery    Other chest pain  Atypical chest pain in a patient with multiple risk factors for coronary artery disease and abnormal EKG.  Echo showed no large wall motion abnormalities.  Further evaluation with  the help of stress test. (may be done as an outpatient if patient unwilling to stay in-house till Tuesday)        VTE Risk Mitigation (From admission, onward)      None            Jordon Pavon MD  Cardiology  [facility]

## 2024-01-13 NOTE — ASSESSMENT & PLAN NOTE
CT abdomen show possible cholecystitis  surgery is consulted  will have HIDA scan- patent ducts  Nausea, vomiting is resolved already- advance diet

## 2024-01-13 NOTE — SUBJECTIVE & OBJECTIVE
Interval History:  No more chest pains.  Now complaining of back pain, left-sided.     Past Medical History:   Diagnosis Date    Diabetes mellitus        History reviewed. No pertinent surgical history.    Review of patient's allergies indicates:  No Known Allergies    No current facility-administered medications on file prior to encounter.     Current Outpatient Medications on File Prior to Encounter   Medication Sig    alogliptin-pioglitazone 12.5-15 mg Tab Take by mouth once daily.    BASAGLAR KWIKPEN U-100 INSULIN glargine 100 units/mL SubQ pen SMARTSI Unit(s) SUB-Q Every Night    empagliflozin 10 mg Tab Take 10 mg by mouth once daily.    ergocalciferol (ERGOCALCIFEROL) 50,000 unit Cap Take 50,000 Units by mouth every 7 days.    insulin aspart protamine-insulin aspart (NOVOLOG 70/30) 100 unit/mL (70-30) InPn pen Inject into the skin 2 (two) times daily before meals.    insulin glargine (LANTUS) 100 unit/mL injection Inject into the skin every evening.    JANUMET -1,000 mg TM24 Take 1 tablet by mouth every morning.    losartan (COZAAR) 25 MG tablet Take 25 mg by mouth.    VEMLIDY 25 mg Tab Take 1 tablet by mouth.    [DISCONTINUED] atorvastatin (LIPITOR) 20 MG tablet Take 20 mg by mouth once daily.     Family History    None       Tobacco Use    Smoking status: Former    Smokeless tobacco: Not on file   Substance and Sexual Activity    Alcohol use: No    Drug use: No    Sexual activity: Not on file     Review of Systems   Constitutional: Negative.   HENT: Negative.     Eyes: Negative.    Respiratory: Negative.     Endocrine: Negative.    Hematologic/Lymphatic: Negative.    Skin: Negative.    Musculoskeletal: Negative.    Gastrointestinal: Negative.    Genitourinary: Negative.    Neurological: Negative.    Psychiatric/Behavioral: Negative.     Allergic/Immunologic: Negative.      Objective:     Vital Signs (Most Recent):  Temp: 97.8 °F (36.6 °C) (24)  Pulse: (!) 58 (24)  Resp: 18  (01/13/24 1506)  BP: 131/68 (01/13/24 1126)  SpO2: 95 % (01/13/24 1126) Vital Signs (24h Range):  Temp:  [97.8 °F (36.6 °C)-98.4 °F (36.9 °C)] 97.8 °F (36.6 °C)  Pulse:  [56-61] 58  Resp:  [16-18] 18  SpO2:  [95 %-99 %] 95 %  BP: (114-154)/(56-72) 131/68     Weight: 70.3 kg (154 lb 15.4 oz)  Body mass index is 25.79 kg/m².    SpO2: 95 %         Intake/Output Summary (Last 24 hours) at 1/13/2024 1618  Last data filed at 1/13/2024 1300  Gross per 24 hour   Intake 720 ml   Output --   Net 720 ml         Lines/Drains/Airways       Peripheral Intravenous Line  Duration                  Peripheral IV - Single Lumen 01/12/24 0328 20 G Posterior;Right Hand 1 day                     Physical Exam  Vitals reviewed.   Constitutional:       Appearance: He is well-developed.   HENT:      Head: Normocephalic.   Eyes:      Conjunctiva/sclera: Conjunctivae normal.      Pupils: Pupils are equal, round, and reactive to light.   Cardiovascular:      Rate and Rhythm: Normal rate and regular rhythm.      Heart sounds: Normal heart sounds.   Pulmonary:      Effort: Pulmonary effort is normal.      Breath sounds: Normal breath sounds.   Abdominal:      General: Bowel sounds are normal.      Palpations: Abdomen is soft.   Musculoskeletal:      Cervical back: Normal range of motion and neck supple.   Skin:     General: Skin is warm.   Neurological:      Mental Status: He is alert and oriented to person, place, and time.          Significant Labs: BMP:   Recent Labs   Lab 01/11/24 2300 01/13/24  0932   * 233*    137   K 3.4* 3.7    108   CO2 25 25   BUN 16 16   CREATININE 0.8 0.7   CALCIUM 8.5* 8.2*   MG  --  1.5*     , CMP   Recent Labs   Lab 01/11/24  2300 01/13/24  0932    137   K 3.4* 3.7    108   CO2 25 25   * 233*   BUN 16 16   CREATININE 0.8 0.7   CALCIUM 8.5* 8.2*   PROT 7.1 6.2   ALBUMIN 3.8 3.3*   BILITOT 0.4 0.8   ALKPHOS 41* 37*   AST 18 14   ALT 60* 35   ANIONGAP 10 4*     , CBC   Recent  "Labs   Lab 01/11/24  2300 01/13/24  0932   WBC 10.87 7.60   HGB 14.5 13.4*   HCT 42.2 40.8    198     , INR No results for input(s): "INR", "PROTIME" in the last 48 hours., Lipid Panel No results for input(s): "CHOL", "HDL", "LDLCALC", "TRIG", "CHOLHDL" in the last 48 hours., Troponin   Recent Labs   Lab 01/11/24  2300 01/12/24  1220   TROPONINI 0.006 <0.006     , and All pertinent lab results from the last 24 hours have been reviewed.    Significant Imaging: Echocardiogram: Transthoracic echo (TTE) complete (Cupid Only):   Results for orders placed or performed during the hospital encounter of 01/11/24   Echo   Result Value Ref Range    BSA 1.8 m2    LVOT stroke volume 77.64 cm3    LVIDd 5.10 3.5 - 6.0 cm    LV Systolic Volume 53.02 mL    LV Systolic Volume Index 30.0 mL/m2    LVIDs 3.56 2.1 - 4.0 cm    LV Diastolic Volume 123.62 mL    LV Diastolic Volume Index 69.84 mL/m2    IVS 0.90 0.6 - 1.1 cm    LVOT diameter 2.13 cm    LVOT area 3.6 cm2    FS 30 28 - 44 %    Left Ventricle Relative Wall Thickness 0.35 cm    Posterior Wall 0.88 0.6 - 1.1 cm    LV mass 161.18 g    LV Mass Index 91 g/m2    MV Peak E Rafiq 0.77 m/s    TDI LATERAL 0.10 m/s    TDI SEPTAL 0.05 m/s    E/E' ratio 10.27 m/s    MV Peak A Rafiq 0.74 m/s    TR Max Rafiq 1.73 m/s    E/A ratio 1.04     IVRT 111.32 msec    E wave deceleration time 194.17 msec    LV SEPTAL E/E' RATIO 15.40 m/s    LV LATERAL E/E' RATIO 7.70 m/s    PV Peak S Rafiq 0.44 m/s    PV Peak D Rafiq 0.45 m/s    Pulm vein S/D ratio 0.98     LVOT peak rafiq 0.95 m/s    Left Ventricular Outflow Tract Mean Velocity 0.61 cm/s    Left Ventricular Outflow Tract Mean Gradient 1.78 mmHg    RVDD 4.50 cm    TAPSE 1.82 cm    LA size 3.90 cm    Left Atrium Minor Axis 4.28 cm    RA Major Axis 4.86 cm    RA Width 4.29 cm    AV mean gradient 4 mmHg    AV peak gradient 6 mmHg    Ao peak rafiq 1.27 m/s    Ao VTI 30.70 cm    LVOT peak VTI 21.80 cm    AV valve area 2.53 cm²    AV Velocity Ratio 0.75     AV index " (prosthetic) 0.71     HAYLEE by Velocity Ratio 2.66 cm²    MV stenosis pressure 1/2 time 56.31 ms    MV valve area p 1/2 method 3.91 cm2    TV peak gradient 1 mmHg    Triscuspid Valve Regurgitation Peak Gradient 12 mmHg    PV PEAK VELOCITY 0.92 m/s    PV peak gradient 3 mmHg    Sinus 3.24 cm    STJ 2.70 cm    Ascending aorta 3.31 cm    IVC diameter 1.45 cm    Mean e' 0.08 m/s    ZLVIDS 1.29     ZLVIDD 0.41     LA Volume Index 37.1 mL/m2    LA volume 65.74 cm3    Left Atrium Major Axis 5.0 cm    LA WIDTH 4.3 cm    TV resting pulmonary artery pressure 15 mmHg    RV TB RVSP 5 mmHg    Est. RA pres 3 mmHg    Narrative      Left Ventricle: The left ventricle is normal in size. Normal wall   thickness. Normal wall motion. There is normal systolic function with a   visually estimated ejection fraction of 55 - 60%. Grade I diastolic   dysfunction.    Right Ventricle: Normal right ventricular cavity size. Systolic   function is normal.    Left Atrium: Left atrium is moderately dilated.    Aortic Valve: The aortic valve is a trileaflet valve.    Mitral Valve: There is mild regurgitation.    Tricuspid Valve: There is mild regurgitation.    Pulmonary Artery: The estimated pulmonary artery systolic pressure is   15 mmHg.    IVC/SVC: Normal venous pressure at 3 mmHg.

## 2024-01-13 NOTE — CONSULTS
[facility] Surg  Cardiology  Consult Note    Patient Name: Malou Suarez  MRN: [id number]  Admission Date: 1/11/2024  Hospital Length of Stay: 0 days  Code Status: No Order   Attending Provider: Haresh De Oliveira, *   Consulting Provider: Jordon Pavon MD  Primary Care Physician: Daniella Leal MD  Principal Problem:Left upper quadrant pain    Patient information was obtained from patient, relative(s), and ER records.     Inpatient consult to Cardiology  Consult performed by: Jordon Pavon MD  Consult ordered by: Haresh De Oliveira MD        Subjective:     Chief Complaint:  cp     HPI:   Patient is a pleasant 58-year-old man.  History obtained with the help of .  Patient states that for the past few weeks he has been having left upper abdominal as well as left chest pain.  Chest pain occurs mostly when he lays down.  No particular aggravating or relieving factors.  He walks everywhere and has not noticed that pain.  General surgery was consulted because with left upper abdominal pain who ordered an EKG which was abnormal with inferior ischemia and old septal infarct and hence Cardiology consult was obtained.  Two tests of troponins have been negative.  Patient is EKG reviewed and as listed above.  Demonstrates inferior T-wave inversions as well as septal anterior Q-waves.  Denies orthopnea, PND.  Currently laying comfortably in bed.  Two sets of troponins have been normal.  Echo shows normal ef    Results for orders placed during the hospital encounter of 01/11/24    Echo    Interpretation Summary    Left Ventricle: The left ventricle is normal in size. Normal wall thickness. Normal wall motion. There is normal systolic function with a visually estimated ejection fraction of 55 - 60%. Grade I diastolic dysfunction.    Right Ventricle: Normal right ventricular cavity size. Systolic function is normal.    Left Atrium: Left atrium is moderately dilated.    Aortic Valve: The aortic valve is a  trileaflet valve.    Mitral Valve: There is mild regurgitation.    Tricuspid Valve: There is mild regurgitation.    Pulmonary Artery: The estimated pulmonary artery systolic pressure is 15 mmHg.    IVC/SVC: Normal venous pressure at 3 mmHg.              Past Medical History:   Diagnosis Date    Diabetes mellitus        History reviewed. No pertinent surgical history.    Review of patient's allergies indicates:  No Known Allergies    No current facility-administered medications on file prior to encounter.     Current Outpatient Medications on File Prior to Encounter   Medication Sig    alogliptin-pioglitazone 12.5-15 mg Tab Take by mouth once daily.    atorvastatin (LIPITOR) 20 MG tablet Take 20 mg by mouth once daily.    BASAGLAR KWIKPEN U-100 INSULIN glargine 100 units/mL SubQ pen SMARTSI Unit(s) SUB-Q Every Night    empagliflozin 10 mg Tab Take 10 mg by mouth once daily.    ergocalciferol (ERGOCALCIFEROL) 50,000 unit Cap Take 50,000 Units by mouth every 7 days.    insulin aspart protamine-insulin aspart (NOVOLOG 70/30) 100 unit/mL (70-30) InPn pen Inject into the skin 2 (two) times daily before meals.    insulin glargine (LANTUS) 100 unit/mL injection Inject into the skin every evening.    JANUMET -1,000 mg TM24 Take 1 tablet by mouth every morning.    losartan (COZAAR) 25 MG tablet Take 25 mg by mouth.    VEMLIDY 25 mg Tab Take 1 tablet by mouth.     Family History    None       Tobacco Use    Smoking status: Former    Smokeless tobacco: Not on file   Substance and Sexual Activity    Alcohol use: No    Drug use: No    Sexual activity: Not on file     Review of Systems   Constitutional: Negative.   HENT: Negative.     Eyes: Negative.    Cardiovascular:  Positive for chest pain.   Respiratory: Negative.     Endocrine: Negative.    Hematologic/Lymphatic: Negative.    Skin: Negative.    Musculoskeletal: Negative.    Gastrointestinal: Negative.    Genitourinary: Negative.    Neurological: Negative.     Psychiatric/Behavioral: Negative.     Allergic/Immunologic: Negative.      Objective:     Vital Signs (Most Recent):  Temp: 97.9 °F (36.6 °C) (01/12/24 1957)  Pulse: (!) 59 (01/12/24 1957)  Resp: 18 (01/12/24 1957)  BP: 138/68 (01/12/24 1957)  SpO2: 99 % (01/12/24 1957) Vital Signs (24h Range):  Temp:  [97.5 °F (36.4 °C)-98.4 °F (36.9 °C)] 97.9 °F (36.6 °C)  Pulse:  [46-66] 59  Resp:  [12-20] 18  SpO2:  [95 %-100 %] 99 %  BP: (109-151)/(55-72) 138/68     Weight: 70.3 kg (154 lb 15.4 oz)  Body mass index is 25.79 kg/m².    SpO2: 99 %         Intake/Output Summary (Last 24 hours) at 1/12/2024 2213  Last data filed at 1/12/2024 1730  Gross per 24 hour   Intake 480 ml   Output --   Net 480 ml       Lines/Drains/Airways       Peripheral Intravenous Line  Duration                  Peripheral IV - Single Lumen 01/12/24 0328 20 G Posterior;Right Hand <1 day                     Physical Exam  Vitals reviewed.   Constitutional:       Appearance: He is well-developed.   HENT:      Head: Normocephalic.   Eyes:      Conjunctiva/sclera: Conjunctivae normal.      Pupils: Pupils are equal, round, and reactive to light.   Cardiovascular:      Rate and Rhythm: Normal rate and regular rhythm.      Heart sounds: Normal heart sounds.   Pulmonary:      Effort: Pulmonary effort is normal.      Breath sounds: Normal breath sounds.   Abdominal:      General: Bowel sounds are normal.      Palpations: Abdomen is soft.   Musculoskeletal:      Cervical back: Normal range of motion and neck supple.   Skin:     General: Skin is warm.   Neurological:      Mental Status: He is alert and oriented to person, place, and time.          Significant Labs: BMP:   Recent Labs   Lab 01/11/24  2300   *      K 3.4*      CO2 25   BUN 16   CREATININE 0.8   CALCIUM 8.5*   , CMP   Recent Labs   Lab 01/11/24  2300      K 3.4*      CO2 25   *   BUN 16   CREATININE 0.8   CALCIUM 8.5*   PROT 7.1   ALBUMIN 3.8   BILITOT 0.4  "  ALKPHOS 41*   AST 18   ALT 60*   ANIONGAP 10   , CBC   Recent Labs   Lab 01/11/24  2300   WBC 10.87   HGB 14.5   HCT 42.2      , INR No results for input(s): "INR", "PROTIME" in the last 48 hours., Lipid Panel No results for input(s): "CHOL", "HDL", "LDLCALC", "TRIG", "CHOLHDL" in the last 48 hours., Troponin   Recent Labs   Lab 01/11/24  2300 01/12/24  1220   TROPONINI 0.006 <0.006   , and All pertinent lab results from the last 24 hours have been reviewed.    Significant Imaging: Echocardiogram: Transthoracic echo (TTE) complete (Cupid Only):   Results for orders placed or performed during the hospital encounter of 01/11/24   Echo   Result Value Ref Range    BSA 1.8 m2    LVOT stroke volume 77.64 cm3    LVIDd 5.10 3.5 - 6.0 cm    LV Systolic Volume 53.02 mL    LV Systolic Volume Index 30.0 mL/m2    LVIDs 3.56 2.1 - 4.0 cm    LV Diastolic Volume 123.62 mL    LV Diastolic Volume Index 69.84 mL/m2    IVS 0.90 0.6 - 1.1 cm    LVOT diameter 2.13 cm    LVOT area 3.6 cm2    FS 30 28 - 44 %    Left Ventricle Relative Wall Thickness 0.35 cm    Posterior Wall 0.88 0.6 - 1.1 cm    LV mass 161.18 g    LV Mass Index 91 g/m2    MV Peak E Rafiq 0.77 m/s    TDI LATERAL 0.10 m/s    TDI SEPTAL 0.05 m/s    E/E' ratio 10.27 m/s    MV Peak A Rafiq 0.74 m/s    TR Max Rafiq 1.73 m/s    E/A ratio 1.04     IVRT 111.32 msec    E wave deceleration time 194.17 msec    LV SEPTAL E/E' RATIO 15.40 m/s    LV LATERAL E/E' RATIO 7.70 m/s    PV Peak S Rafiq 0.44 m/s    PV Peak D Rafiq 0.45 m/s    Pulm vein S/D ratio 0.98     LVOT peak rafiq 0.95 m/s    Left Ventricular Outflow Tract Mean Velocity 0.61 cm/s    Left Ventricular Outflow Tract Mean Gradient 1.78 mmHg    RVDD 4.50 cm    TAPSE 1.82 cm    LA size 3.90 cm    Left Atrium Minor Axis 4.28 cm    RA Major Axis 4.86 cm    RA Width 4.29 cm    AV mean gradient 4 mmHg    AV peak gradient 6 mmHg    Ao peak rafiq 1.27 m/s    Ao VTI 30.70 cm    LVOT peak VTI 21.80 cm    AV valve area 2.53 cm²    AV " Velocity Ratio 0.75     AV index (prosthetic) 0.71     HAYLEE by Velocity Ratio 2.66 cm²    MV stenosis pressure 1/2 time 56.31 ms    MV valve area p 1/2 method 3.91 cm2    TV peak gradient 1 mmHg    Triscuspid Valve Regurgitation Peak Gradient 12 mmHg    PV PEAK VELOCITY 0.92 m/s    PV peak gradient 3 mmHg    Sinus 3.24 cm    STJ 2.70 cm    Ascending aorta 3.31 cm    IVC diameter 1.45 cm    Mean e' 0.08 m/s    ZLVIDS 1.29     ZLVIDD 0.41     LA Volume Index 37.1 mL/m2    LA volume 65.74 cm3    Left Atrium Major Axis 5.0 cm    LA WIDTH 4.3 cm    TV resting pulmonary artery pressure 15 mmHg    RV TB RVSP 5 mmHg    Est. RA pres 3 mmHg    Narrative      Left Ventricle: The left ventricle is normal in size. Normal wall   thickness. Normal wall motion. There is normal systolic function with a   visually estimated ejection fraction of 55 - 60%. Grade I diastolic   dysfunction.    Right Ventricle: Normal right ventricular cavity size. Systolic   function is normal.    Left Atrium: Left atrium is moderately dilated.    Aortic Valve: The aortic valve is a trileaflet valve.    Mitral Valve: There is mild regurgitation.    Tricuspid Valve: There is mild regurgitation.    Pulmonary Artery: The estimated pulmonary artery systolic pressure is   15 mmHg.    IVC/SVC: Normal venous pressure at 3 mmHg.       Assessment and Plan:     * Left upper quadrant pain  Rx per primary / surgery    Other chest pain  Atypical chest pain in a patient with multiple risk factors for coronary artery disease and abnormal EKG.  Echo showed no large wall motion abnormalities.  Further evaluation with the help of stress test.        VTE Risk Mitigation (From admission, onward)      None            Thank you for your consult. I will follow-up with patient. Please contact us if you have any additional questions.    Jordon Pavon MD  Cardiology   [facility] [place] [facility] S[facility]

## 2024-01-13 NOTE — PROGRESS NOTES
[facility] Surg  General Surgery  Progress Note    Subjective:     History of Present Illness:  This is a 59 yo M with history of diabetes and hypertension who presented to the ED with complaints of left sided abdominal/chest wall pain. He states he has had two similar episodes over the last few weeks that resolve on their own. His pain is now significantly improved. He denies nausea/vomiting. He is afebrile, hemodynamically stable, normal bilirubin and has no leukocytosis. CT shows mild gallbladder distension with possible trace fluid and cholelithiasis.  He has not had any previous abdominal surgery.     Post-Op Info:  * No surgery found *         Interval History: NAEO. Ischemic changes on EKG yesterday, troponins normal, echo relatively normal. Ongoing cardiology workup. HIDA demonstrates filling of the gallbladder (no acute cholecystitis). Minimal pain. AF, HDS    Medications:  Continuous Infusions:   sodium chloride 0.9% 75 mL/hr at 01/13/24 0358     Scheduled Meds:  PRN Meds:acetaminophen, dextrose 10%, dextrose 10%, glucagon (human recombinant), glucose, glucose, insulin aspart U-100, ondansetron     Review of patient's allergies indicates:  No Known Allergies  Objective:     Vital Signs (Most Recent):  Temp: 98.3 °F (36.8 °C) (01/13/24 0728)  Pulse: 61 (01/13/24 0728)  Resp: 16 (01/13/24 0728)  BP: 138/67 (01/13/24 0728)  SpO2: 95 % (01/13/24 0728) Vital Signs (24h Range):  Temp:  [97.8 °F (36.6 °C)-98.4 °F (36.9 °C)] 98.3 °F (36.8 °C)  Pulse:  [56-61] 61  Resp:  [16-18] 16  SpO2:  [95 %-99 %] 95 %  BP: (114-154)/(56-72) 138/67     Weight: 70.3 kg (154 lb 15.4 oz)  Body mass index is 25.79 kg/m².    Intake/Output - Last 3 Shifts         01/11 0700  01/12 0659 01/12 0700 01/13 0659 01/13 0700 01/14 0659    P.O.  480 240    Total Intake(mL/kg)  480 (6.8) 240 (3.4)    Net  +480 +240           Urine Occurrence  4 x 1 x    Stool Occurrence  0 x              Physical Exam  Constitutional:       General: He  is not in acute distress.     Appearance: Normal appearance. He is not ill-appearing.   HENT:      Head: Normocephalic and atraumatic.   Eyes:      Extraocular Movements: Extraocular movements intact.      Pupils: Pupils are equal, round, and reactive to light.   Cardiovascular:      Rate and Rhythm: Normal rate and regular rhythm.   Pulmonary:      Effort: Pulmonary effort is normal. No respiratory distress.   Abdominal:      General: Abdomen is flat. There is no distension.      Palpations: Abdomen is soft.      Tenderness: There is no abdominal tenderness. There is no guarding.      Comments: Abdomen is soft, non-distended, no tenderness in LUQ or RUQ. No guarding or rebound   Musculoskeletal:         General: No swelling. Normal range of motion.   Skin:     General: Skin is warm and dry.   Neurological:      General: No focal deficit present.      Mental Status: He is alert.          Significant Labs:  I have reviewed all pertinent lab results within the past 24 hours.  CBC:   Recent Labs   Lab 01/13/24  0932   WBC 7.60   RBC 4.61   HGB 13.4*   HCT 40.8      MCV 89   MCH 29.1   MCHC 32.8     CMP:   Recent Labs   Lab 01/13/24  0932   *   CALCIUM 8.2*   ALBUMIN 3.3*   PROT 6.2      K 3.7   CO2 25      BUN 16   CREATININE 0.7   ALKPHOS 37*   ALT 35   AST 14   BILITOT 0.8       Significant Diagnostics:  I have reviewed all pertinent imaging results/findings within the past 24 hours.  Assessment/Plan:     * Left upper quadrant pain  Malou Suarez is a 57 yo M with DM and HTN who presents with resolving/resolved LUQ and left chest wall pain. During physical exam, he states that his left chest wall was the primary location of this pain and his abdominal exam is benign. It would be very unusual for acute cholecystitis to present with left sided chest pain/LUQ pain that resolves without any antibiotics or surgery. CT has some soft signs of cholecystitis and cholelithiasis.     - admitted to the  hospital medicine service  - HIDA shows filling of the gallbladder, no acute cholecystitis, pain resolved, tolerating diet  - ongoing cardiology workup  - patient can follow up with surgery as outpatient  - surgery will sign off at this time, please reach back out with further questions or concerns about the care of this patient            Ari Vu MD  General Surgery  [facility] - Med Surg

## 2024-01-13 NOTE — NURSING
@2008 pt was given tylenol 650mg for pain; pt called complaining of pain and requesting something else for pain. Notified DARIUS Muniz; new order placed for morphine one time dose, given. Will cont to monitor

## 2024-01-13 NOTE — HOSPITAL COURSE
Presented with LUQ pain. Atypical chest pains with WMA on ECHO, will need Stress test. CT showing gallbladder wall thickening particular involving the gallbladder fundus with pericholecystic edema or trace fluid; HIDA scan showing patent ducts, and proper fxn. Positive cologaurd screen this time last year, no f/u colonoscopy outpt on record, will check CEA and patient will need outpt C-scope.

## 2024-01-13 NOTE — DISCHARGE SUMMARY
[facility] Medicine  Discharge Summary      Patient Name: Malou Suarez  MRN: [id number]  [facility]: [id number]  Patient Class: IP- Inpatient  Admission Date: 1/11/2024  Hospital Length of Stay: 1 days  Discharge Date and Time:  01/13/2024 1:18 PM  Attending Physician: Tello Ng MD   Discharging Provider: Tello Ng MD  Primary Care Provider: Daniella Leal MD    Primary Care Team: Networked reference to record PCT     HPI:   58-year-old male with a history of diabetes presenting with left upper quadrant abdominal pain associated nausea and vomiting lasting for about an hour to 2 hours.  Patient reports he had similar symptoms on Sunday.  Denies chest pain, shortness of breath, fevers, chills, diarrhea, constipation.  Notes multiple episodes of nausea and vomiting.  Reports symptoms started this morning. CT abdomen show possible cholecystitis, surgery is consulted,will have HIDA scan today.      * No surgery found *      Hospital Course:   Presented with LUQ pain. Atypical chest pains with WMA on ECHO, will need Stress test. CT showing gallbladder wall thickening particular involving the gallbladder fundus with pericholecystic edema or trace fluid; HIDA scan showing patent ducts, and proper fxn. Positive cologaurd screen this time last year, no f/u colonoscopy outpt on record, will check CEA and patient will need outpt C-scope.      GI, GenSx and Card evaluated patient, all outpt workups at this point.     Goals of Care Treatment Preferences:       Patient instructions in Romanian:   Dùng aspirin 81 mg m?i ngày và atorvastatin 40 mg vô th?i h?n   Tôi c?ng ?ã g?i m?t s? thu?c gi?m ?au ??n hi?u thu?c c?a b?n   ??t l?ch n?i soi ??i tràng, tôi ?ã ??t hàng nên b?n ch? c?n ch?n th?i wojciech trên l?ch   S? s?m c?n m?t bài ki?m tra c?ng th?ng   Hubert dõi v?i PCP, Richi m?ch, GI và Ph?u thu?t t?ng quát   Nghe có v? h?i ?au c? x??ng kh?p bên s??n trái, yêu c?u v?t lý tr? li?u quinn?i trú      C?m ?n b?n ?ã tin t??ng  B?nh vi?n [facility] và tôi v?i s? ch?m sóc c?a b?n. David richardi r?t victorino d? khi ???c b?n giao phó noah c?u ch?m sóc s?c kh?e c?a b?n cho david tôi. S? hài lòng c?a b?n r?t malou tr?ng ??i v?i david tôi và david tôi hy v?ng b?n r?t hài lòng v?i tr?i candida?m c?a mình t?i [facility]. Lissa khi schulz?t vi?n, b?n có th? nh?n ???c m?t b?n kh?o sát yêu c?u b?n ?ánh giá tr?i candida?m c?a mình t?i b?nh vi?n. David evans khuy?n khích b?n dành th?i wojciech hoàn thành b?n kh?o sát vì ph?n h?i c?a b?n cho phép david richardi xác ??nh các l?nh v?c c?n c?i thi?n c?ng nh? ghi nh?n s? malou tâm ch?m sóc c?a nhân viên david richardi. David richardi hy v?ng r?ng b?n ?ã nh?n ???c s? ch?m sóc t?t nh?t có th? andrés th?i wojciech n?m vi?n t?i [facility], vì s? hài lòng c?a b?n là ?u tiên hàng ??u c?a chúng tôi.    Hãy cho tôi bi?t n?u tôi có th? làm gì thêm!!         Tello Ng MD  [facility] d? Y h?c N?i jose luis ???c H?i ??ng Ch?ng nh?n  Tr??ng jose luis Y b?nh vi?n    Patient instructions in English:  Take aspirin 81 mg daily and atorvastatin 40 mg indefinitely  I sent some pain medication to your pharmacy as well  Get Colonoscopy scheduled, I placed the order so you can just pick a time with the   Will need a stress test soon  Follow-up with PCP, Cardiology, GI and General surgery  Sounds like a bit of musculoskeletal pain on left flank, outpatient physical therapy ordered        Thank you for trusting [facility] and me with your care.  We are honored that you entrusted us with your healthcare needs. Your satisfaction is very important to us and we hope you have been very pleased with your experience at [facility]. After your discharge you may receive a survey asking you to rate your hospital experience. We encourage you to take the time to complete the survey as your feedback allows us to identify areas for improvement as well as recognize our staff for their care. We hope that you have received the very best care possible  during your hospitalization at [facility], as your satisfaction is our top priority.    Let me know if there is anything more I can do!!        Tello Ng MD  Board-Certified Internal Medicine Attending  Section Head of Hospital Medicine    Consults:   Consults (From admission, onward)          Status Ordering Provider     Inpatient consult to Cardiology  Once        Provider:  Jordon Pavon MD    Completed ZOEY WILKINSON     Inpatient consult to General Surgery  Once        Provider:  Ari Vu MD    Completed ZOEY WILKINSON            No new Assessment & Plan notes have been filed under this hospital service since the last note was generated.  Service: Hospital Medicine    Final Active Diagnoses:    Diagnosis Date Noted POA    PRINCIPAL PROBLEM:  Left upper quadrant pain [R10.12] 01/12/2024 Yes    Positive colorectal cancer screening using Cologuard test [R19.5] 01/13/2024 Yes    Other chest pain [R07.89] 01/12/2024 Yes      Problems Resolved During this Admission:       Discharged Condition: good    Disposition: home    Follow Up:    Patient Instructions:      Ambulatory referral/consult to Cardiology   Standing Status: Future   Referral Priority: Routine Referral Type: Consultation   Referral Reason: Specialty Services Required   Requested Specialty: Cardiology   Number of Visits Requested: 1     Ambulatory referral/consult to Gastroenterology   Standing Status: Future   Referral Priority: Routine Referral Type: Consultation   Referral Reason: Specialty Services Required   Requested Specialty: Gastroenterology   Number of Visits Requested: 1     Ambulatory referral/consult to Internal Medicine   Standing Status: Future   Referral Priority: Routine Referral Type: Consultation   Referral Reason: Specialty Services Required   Requested Specialty: Internal Medicine   Number of Visits Requested: 1     Ambulatory referral/consult to Physical/Occupational Therapy   Standing Status:  Future   Referral Priority: Routine Referral Type: Physical Medicine   Referral Reason: Specialty Services Required   Number of Visits Requested: 1     Ambulatory referral/consult to General Surgery   Standing Status: Future   Referral Priority: Routine Referral Type: Consultation   Referral Reason: Specialty Services Required   Requested Specialty: General Surgery   Number of Visits Requested: 1     Case Request Endoscopy: COLONOSCOPY     Order Specific Question Answer Comments   Medical Necessity: Medically Urgent [101]    CPT Code: COLORECTAL SCRN; HI RISK IND []    Is an on-site pathologist required for this procedure? N/A        Significant Diagnostic Studies:   Recent Results (from the past 100 hour(s))   CBC W/ AUTO DIFFERENTIAL    Collection Time: 01/11/24 11:00 PM   Result Value Ref Range    WBC 10.87 3.90 - 12.70 K/uL    RBC 4.89 4.60 - 6.20 M/uL    Hemoglobin 14.5 14.0 - 18.0 g/dL    Hematocrit 42.2 40.0 - 54.0 %    MCV 86 82 - 98 fL    MCH 29.7 27.0 - 31.0 pg    MCHC 34.4 32.0 - 36.0 g/dL    RDW 12.9 11.5 - 14.5 %    Platelets 212 150 - 450 K/uL    MPV 9.4 9.2 - 12.9 fL    Immature Granulocytes 0.4 0.0 - 0.5 %    Gran # (ANC) 6.7 1.8 - 7.7 K/uL    Immature Grans (Abs) 0.04 0.00 - 0.04 K/uL    Lymph # 3.2 1.0 - 4.8 K/uL    Mono # 0.6 0.3 - 1.0 K/uL    Eos # 0.3 0.0 - 0.5 K/uL    Baso # 0.04 0.00 - 0.20 K/uL    nRBC 0 0 /100 WBC    Gran % 61.4 38.0 - 73.0 %    Lymph % 29.2 18.0 - 48.0 %    Mono % 5.9 4.0 - 15.0 %    Eosinophil % 2.7 0.0 - 8.0 %    Basophil % 0.4 0.0 - 1.9 %    Differential Method Automated    Comp. Metabolic Panel    Collection Time: 01/11/24 11:00 PM   Result Value Ref Range    Sodium 141 136 - 145 mmol/L    Potassium 3.4 (L) 3.5 - 5.1 mmol/L    Chloride 106 95 - 110 mmol/L    CO2 25 23 - 29 mmol/L    Glucose 181 (H) 70 - 110 mg/dL    BUN 16 6 - 20 mg/dL    Creatinine 0.8 0.5 - 1.4 mg/dL    Calcium 8.5 (L) 8.7 - 10.5 mg/dL    Total Protein 7.1 6.0 - 8.4 g/dL    Albumin 3.8 3.5 - 5.2  g/dL    Total Bilirubin 0.4 0.1 - 1.0 mg/dL    Alkaline Phosphatase 41 (L) 55 - 135 U/L    AST 18 10 - 40 U/L    ALT 60 (H) 10 - 44 U/L    eGFR >60 >60 mL/min/1.73 m^2    Anion Gap 10 8 - 16 mmol/L   Lipase    Collection Time: 01/11/24 11:00 PM   Result Value Ref Range    Lipase 64 (H) 4 - 60 U/L   Troponin I    Collection Time: 01/11/24 11:00 PM   Result Value Ref Range    Troponin I 0.006 0.000 - 0.026 ng/mL   Urinalysis, Reflex to Urine Culture Urine, Clean Catch    Collection Time: 01/12/24  1:19 AM    Specimen: Urine   Result Value Ref Range    Specimen UA Urine, Clean Catch     Color, UA Yellow Yellow, Straw, Denice    Appearance, UA Clear Clear    pH, UA 7.0 5.0 - 8.0    Specific Gravity, UA >1.030 (A) 1.005 - 1.030    Protein, UA Negative Negative    Glucose, UA 4+ (A) Negative    Ketones, UA Negative Negative    Bilirubin (UA) Negative Negative    Occult Blood UA Negative Negative    Nitrite, UA Negative Negative    Urobilinogen, UA Negative <2.0 EU/dL    Leukocytes, UA Negative Negative   Urinalysis Microscopic    Collection Time: 01/12/24  1:19 AM   Result Value Ref Range    Bacteria None None-Occ /hpf    Yeast, UA None None    Microscopic Comment SEE COMMENT    POCT glucose    Collection Time: 01/12/24  8:08 AM   Result Value Ref Range    POCT Glucose 122 (H) 70 - 110 mg/dL   Troponin I    Collection Time: 01/12/24 12:20 PM   Result Value Ref Range    Troponin I <0.006 0.000 - 0.026 ng/mL   Echo    Collection Time: 01/12/24  2:16 PM   Result Value Ref Range    BSA 1.8 m2    LVOT stroke volume 77.64 cm3    LVIDd 5.10 3.5 - 6.0 cm    LV Systolic Volume 53.02 mL    LV Systolic Volume Index 30.0 mL/m2    LVIDs 3.56 2.1 - 4.0 cm    LV Diastolic Volume 123.62 mL    LV Diastolic Volume Index 69.84 mL/m2    IVS 0.90 0.6 - 1.1 cm    LVOT diameter 2.13 cm    LVOT area 3.6 cm2    FS 30 28 - 44 %    Left Ventricle Relative Wall Thickness 0.35 cm    Posterior Wall 0.88 0.6 - 1.1 cm    LV mass 161.18 g    LV Mass Index  91 g/m2    MV Peak E Rafiq 0.77 m/s    TDI LATERAL 0.10 m/s    TDI SEPTAL 0.05 m/s    E/E' ratio 10.27 m/s    MV Peak A Rafiq 0.74 m/s    TR Max Rafiq 1.73 m/s    E/A ratio 1.04     IVRT 111.32 msec    E wave deceleration time 194.17 msec    LV SEPTAL E/E' RATIO 15.40 m/s    LV LATERAL E/E' RATIO 7.70 m/s    PV Peak S Rafiq 0.44 m/s    PV Peak D Rafiq 0.45 m/s    Pulm vein S/D ratio 0.98     LVOT peak rafiq 0.95 m/s    Left Ventricular Outflow Tract Mean Velocity 0.61 cm/s    Left Ventricular Outflow Tract Mean Gradient 1.78 mmHg    RVDD 4.50 cm    TAPSE 1.82 cm    LA size 3.90 cm    Left Atrium Minor Axis 4.28 cm    RA Major Axis 4.86 cm    RA Width 4.29 cm    AV mean gradient 4 mmHg    AV peak gradient 6 mmHg    Ao peak rafiq 1.27 m/s    Ao VTI 30.70 cm    LVOT peak VTI 21.80 cm    AV valve area 2.53 cm²    AV Velocity Ratio 0.75     AV index (prosthetic) 0.71     HAYLEE by Velocity Ratio 2.66 cm²    MV stenosis pressure 1/2 time 56.31 ms    MV valve area p 1/2 method 3.91 cm2    TV peak gradient 1 mmHg    Triscuspid Valve Regurgitation Peak Gradient 12 mmHg    PV PEAK VELOCITY 0.92 m/s    PV peak gradient 3 mmHg    Sinus 3.24 cm    STJ 2.70 cm    Ascending aorta 3.31 cm    IVC diameter 1.45 cm    Mean e' 0.08 m/s    ZLVIDS 1.29     ZLVIDD 0.41     LA Volume Index 37.1 mL/m2    LA volume 65.74 cm3    Left Atrium Major Axis 5.0 cm    LA WIDTH 4.3 cm    TV resting pulmonary artery pressure 15 mmHg    RV TB RVSP 5 mmHg    Est. RA pres 3 mmHg   POCT glucose    Collection Time: 01/12/24  4:42 PM   Result Value Ref Range    POCT Glucose 143 (H) 70 - 110 mg/dL   POCT glucose    Collection Time: 01/12/24  7:58 PM   Result Value Ref Range    POCT Glucose 188 (H) 70 - 110 mg/dL   POCT glucose    Collection Time: 01/13/24  7:28 AM   Result Value Ref Range    POCT Glucose 91 70 - 110 mg/dL   Magnesium    Collection Time: 01/13/24  9:32 AM   Result Value Ref Range    Magnesium 1.5 (L) 1.6 - 2.6 mg/dL   Phosphorus    Collection Time: 01/13/24   9:32 AM   Result Value Ref Range    Phosphorus 3.1 2.7 - 4.5 mg/dL   Comprehensive Metabolic Panel    Collection Time: 01/13/24  9:32 AM   Result Value Ref Range    Sodium 137 136 - 145 mmol/L    Potassium 3.7 3.5 - 5.1 mmol/L    Chloride 108 95 - 110 mmol/L    CO2 25 23 - 29 mmol/L    Glucose 233 (H) 70 - 110 mg/dL    BUN 16 6 - 20 mg/dL    Creatinine 0.7 0.5 - 1.4 mg/dL    Calcium 8.2 (L) 8.7 - 10.5 mg/dL    Total Protein 6.2 6.0 - 8.4 g/dL    Albumin 3.3 (L) 3.5 - 5.2 g/dL    Total Bilirubin 0.8 0.1 - 1.0 mg/dL    Alkaline Phosphatase 37 (L) 55 - 135 U/L    AST 14 10 - 40 U/L    ALT 35 10 - 44 U/L    eGFR >60 >60 mL/min/1.73 m^2    Anion Gap 4 (L) 8 - 16 mmol/L   CBC Auto Differential    Collection Time: 01/13/24  9:32 AM   Result Value Ref Range    WBC 7.60 3.90 - 12.70 K/uL    RBC 4.61 4.60 - 6.20 M/uL    Hemoglobin 13.4 (L) 14.0 - 18.0 g/dL    Hematocrit 40.8 40.0 - 54.0 %    MCV 89 82 - 98 fL    MCH 29.1 27.0 - 31.0 pg    MCHC 32.8 32.0 - 36.0 g/dL    RDW 13.2 11.5 - 14.5 %    Platelets 198 150 - 450 K/uL    MPV 10.0 9.2 - 12.9 fL    Immature Granulocytes 0.4 0.0 - 0.5 %    Gran # (ANC) 5.1 1.8 - 7.7 K/uL    Immature Grans (Abs) 0.03 0.00 - 0.04 K/uL    Lymph # 1.7 1.0 - 4.8 K/uL    Mono # 0.5 0.3 - 1.0 K/uL    Eos # 0.2 0.0 - 0.5 K/uL    Baso # 0.03 0.00 - 0.20 K/uL    nRBC 0 0 /100 WBC    Gran % 67.5 38.0 - 73.0 %    Lymph % 22.1 18.0 - 48.0 %    Mono % 6.7 4.0 - 15.0 %    Eosinophil % 2.9 0.0 - 8.0 %    Basophil % 0.4 0.0 - 1.9 %    Differential Method Automated    POCT glucose    Collection Time: 01/13/24 11:25 AM   Result Value Ref Range    POCT Glucose 185 (H) 70 - 110 mg/dL       Microbiology Results (last 7 days)       ** No results found for the last 168 hours. **            Imaging Results              NM Hepatobiliary Scan (HIDA) (Final result)  Result time 01/12/24 14:32:07      Final result by Chintan Anders MD (01/12/24 14:32:07)                   Impression:      The cystic and common  jermaine ducts are patent (noting presence of gallbladder activity on delayed 3 hour images).      Electronically signed by: Chintan Anders  Date:    01/12/2024  Time:    14:32               Narrative:    EXAMINATION:  NM HEPATOBILIARY IMAGING INC GB (HIDA)    CLINICAL HISTORY:  Cholecystitis;    TECHNIQUE:  Following the IV administration of 5.5 mCi of Tc-99m-mebrofenin, sequential dynamic anterior images of the abdomen were obtained for 60 minutes followed by a right lateral view at 60 minutes.  Additional 3 hour delayed anterior and lateral images were obtained.    COMPARISON:  None    FINDINGS:  The early images demonstrate homogeneous uptake of the radiopharmaceutical by the liver.  No focal hepatic abnormality.    Activity is present in the common bile duct and small bowel on initial static 60 minute images.  Gallbladder activity was not initially present though is seen on subsequent delayed 3 hour delayed images.    The clearance from the liver is appropriate.                                        CT Abdomen Pelvis With IV Contrast NO Oral Contrast (Final result)  Result time 01/12/24 04:00:08      Final result by Vipin Cottrell MD (01/12/24 04:00:08)                   Impression:      Findings suggestive for acute cholecystitis, as above, with 5 mm calculus at the gallbladder neck.    No bile duct dilatation.    This report was flagged in Epic as abnormal.      Electronically signed by: Vipin Cottrell MD  Date:    01/12/2024  Time:    04:00               Narrative:    EXAMINATION:  CT ABDOMEN PELVIS WITH IV CONTRAST    CLINICAL HISTORY:  Abdominal abscess/infection suspected;Nausea/vomiting;Pancreatitis, acute, severe;    TECHNIQUE:  Low dose axial images, sagittal and coronal reformations were obtained from the lung bases to the pubic symphysis following the IV administration of 80 mL of Omnipaque 350 .  Oral contrast was not administered.    COMPARISON:  None.    FINDINGS:  Abdomen:    - Lower  thorax:Unremarkable.    - Lung bases: No infiltrates and no nodules.    - Liver: No focal mass.    - Gallbladder: Mildly distended gallbladder.  Gallbladder wall thickening particular involving the gallbladder fundus with pericholecystic edema or trace fluid.  A few small calcified stones identified in the fundus.  5 mm calculus identified in the gallbladder neck..    - Bile Ducts: No evidence of intra or extra hepatic biliary ductal dilation.    - Spleen: Negative.    - Kidneys: No mass or hydronephrosis.    - Adrenals: Unremarkable.    - Pancreas: No mass or peripancreatic fat stranding.    - Retroperitoneum:  No significant adenopathy.    - Vascular: No abdominal aortic aneurysm.  Moderate aortoiliac calcific atherosclerosis.    - Abdominal wall:  Subcutaneous edema in the lower abdominal wall suggesting injection sites.    Pelvis:    No pelvic mass, adenopathy, or free fluid.    Bowel/Mesentery:    No evidence of bowel obstruction or inflammation.  Normal appendix.    Bones:  Remote fractures of right posterolateral 6th, 7th and 8th ribs.                                          Pending Diagnostic Studies:       Procedure Component Value Units Date/Time    CEA [[id number]] Collected: 01/13/24 0932    Order Status: Sent Lab Status: In process Updated: 01/13/24 0935    Specimen: Blood     EKG 12-lead [[id number]]     Order Status: Sent Lab Status: No result     EKG 12-lead [[id number]]     Order Status: Sent Lab Status: No result            Medications:  Reconciled Home Medications:      Medication List        START taking these medications      aspirin 81 MG EC tablet  Commonly known as: ECOTRIN  Take 1 tablet (81 mg total) by mouth once daily.     traMADoL 50 mg tablet  Commonly known as: ULTRAM  Take 1 tablet (50 mg total) by mouth every 8 (eight) hours as needed for Pain.            CHANGE how you take these medications      atorvastatin 40 MG tablet  Commonly known as: LIPITOR  Take 1 tablet (40 mg total) by  mouth once daily.  What changed:   medication strength  how much to take            CONTINUE taking these medications      alogliptin-pioglitazone 12.5-15 mg Tab  Commonly known as: OSENI  Take by mouth once daily.     empagliflozin 10 mg tablet  Commonly known as: Jardiance  Take 10 mg by mouth once daily.     ergocalciferol 50,000 unit Cap  Commonly known as: ERGOCALCIFEROL  Take 50,000 Units by mouth every 7 days.     insulin aspart protamine-insulin aspart 100 unit/mL (70-30) Inpn pen  Commonly known as: NovoLOG 70/30  Inject into the skin 2 (two) times daily before meals.     * insulin glargine 100 unit/mL injection  Commonly known as: Lantus  Inject into the skin every evening.     * BASAGLAR KWIKPEN U-100 INSULIN glargine 100 units/mL SubQ pen  Generic drug: insulin  SMARTSI Unit(s) SUB-Q Every Night     JANUMET -1,000 mg Tm24  Generic drug: SITagliptan-metformin  Take 1 tablet by mouth every morning.     losartan 25 MG tablet  Commonly known as: COZAAR  Take 25 mg by mouth.     VEMLIDY 25 mg Tab  Generic drug: tenofovir alafenamide  Take 1 tablet by mouth.           * This list has 2 medication(s) that are the same as other medications prescribed for you. Read the directions carefully, and ask your doctor or other care provider to review them with you.                  Indwelling Lines/Drains at time of discharge:   Lines/Drains/Airways       None                   Time spent on the discharge of patient: 35 minutes         Tello Ng MD  Department of [facility] Medicine  [facility]

## 2024-01-13 NOTE — HPI
Patient is a pleasant 58-year-old man.  History obtained with the help of .  Patient states that for the past few weeks he has been having left upper abdominal as well as left chest pain.  Chest pain occurs mostly when he lays down.  No particular aggravating or relieving factors.  He walks everywhere and has not noticed that pain.  General surgery was consulted because with left upper abdominal pain who ordered an EKG which was abnormal with inferior ischemia and old septal infarct and hence Cardiology consult was obtained.  Two tests of troponins have been negative.  Patient is EKG reviewed and as listed above.  Demonstrates inferior T-wave inversions as well as septal anterior Q-waves.  Denies orthopnea, PND.  Currently laying comfortably in bed.  Two sets of troponins have been normal.  Echo shows normal ef    Results for orders placed during the hospital encounter of 01/11/24    Echo    Interpretation Summary    Left Ventricle: The left ventricle is normal in size. Normal wall thickness. Normal wall motion. There is normal systolic function with a visually estimated ejection fraction of 55 - 60%. Grade I diastolic dysfunction.    Right Ventricle: Normal right ventricular cavity size. Systolic function is normal.    Left Atrium: Left atrium is moderately dilated.    Aortic Valve: The aortic valve is a trileaflet valve.    Mitral Valve: There is mild regurgitation.    Tricuspid Valve: There is mild regurgitation.    Pulmonary Artery: The estimated pulmonary artery systolic pressure is 15 mmHg.    IVC/SVC: Normal venous pressure at 3 mmHg.

## 2024-01-13 NOTE — SUBJECTIVE & OBJECTIVE
Interval History: NAEO. Ischemic changes on EKG yesterday, troponins normal, echo relatively normal. Ongoing cardiology workup. HIDA demonstrates filling of the gallbladder (no acute cholecystitis). Minimal pain. AF, HDS    Medications:  Continuous Infusions:   sodium chloride 0.9% 75 mL/hr at 01/13/24 0358     Scheduled Meds:  PRN Meds:acetaminophen, dextrose 10%, dextrose 10%, glucagon (human recombinant), glucose, glucose, insulin aspart U-100, ondansetron     Review of patient's allergies indicates:  No Known Allergies  Objective:     Vital Signs (Most Recent):  Temp: 98.3 °F (36.8 °C) (01/13/24 0728)  Pulse: 61 (01/13/24 0728)  Resp: 16 (01/13/24 0728)  BP: 138/67 (01/13/24 0728)  SpO2: 95 % (01/13/24 0728) Vital Signs (24h Range):  Temp:  [97.8 °F (36.6 °C)-98.4 °F (36.9 °C)] 98.3 °F (36.8 °C)  Pulse:  [56-61] 61  Resp:  [16-18] 16  SpO2:  [95 %-99 %] 95 %  BP: (114-154)/(56-72) 138/67     Weight: 70.3 kg (154 lb 15.4 oz)  Body mass index is 25.79 kg/m².    Intake/Output - Last 3 Shifts         01/11 0700  01/12 0659 01/12 0700 01/13 0659 01/13 0700  01/14 0659    P.O.  480 240    Total Intake(mL/kg)  480 (6.8) 240 (3.4)    Net  +480 +240           Urine Occurrence  4 x 1 x    Stool Occurrence  0 x              Physical Exam  Constitutional:       General: He is not in acute distress.     Appearance: Normal appearance. He is not ill-appearing.   HENT:      Head: Normocephalic and atraumatic.   Eyes:      Extraocular Movements: Extraocular movements intact.      Pupils: Pupils are equal, round, and reactive to light.   Cardiovascular:      Rate and Rhythm: Normal rate and regular rhythm.   Pulmonary:      Effort: Pulmonary effort is normal. No respiratory distress.   Abdominal:      General: Abdomen is flat. There is no distension.      Palpations: Abdomen is soft.      Tenderness: There is no abdominal tenderness. There is no guarding.      Comments: Abdomen is soft, non-distended, no tenderness in LUQ or  RUQ. No guarding or rebound   Musculoskeletal:         General: No swelling. Normal range of motion.   Skin:     General: Skin is warm and dry.   Neurological:      General: No focal deficit present.      Mental Status: He is alert.          Significant Labs:  I have reviewed all pertinent lab results within the past 24 hours.  CBC:   Recent Labs   Lab 01/13/24  0932   WBC 7.60   RBC 4.61   HGB 13.4*   HCT 40.8      MCV 89   MCH 29.1   MCHC 32.8     CMP:   Recent Labs   Lab 01/13/24  0932   *   CALCIUM 8.2*   ALBUMIN 3.3*   PROT 6.2      K 3.7   CO2 25      BUN 16   CREATININE 0.7   ALKPHOS 37*   ALT 35   AST 14   BILITOT 0.8       Significant Diagnostics:  I have reviewed all pertinent imaging results/findings within the past 24 hours.

## 2024-01-13 NOTE — ASSESSMENT & PLAN NOTE
Atypical chest pain in a patient with multiple risk factors for coronary artery disease and abnormal EKG.  Echo showed no large wall motion abnormalities.  Further evaluation with the help of stress test.

## 2024-01-13 NOTE — PLAN OF CARE
Case Management Final Discharge Note      Discharge Disposition: Home    New DME ordered / company name: none    Relevant SDOH / Transition of Care Barriers:  none    Primary Caretaker and contact information:   Monica Suarez (Daughter)  [phone]    Scheduled followup appointment: Patient instructed to follow up with pcp, listed on AVS.     Referrals placed: Internal med, cardiology, GI, gen surgery and out patient PT; clinics to contact pt to schedule.    Transportation: family    Patient and family educated on discharge services and updated on DC plan. Bedside RN notified, patient clear to discharge from Case Management Perspective.      01/13/24 1414   Final Note   Assessment Type Final Discharge Note   Anticipated Discharge Disposition Home   Hospital Resources/Appts/Education Provided Provided patient/caregiver with written discharge plan information   Post-Acute Status   Discharge Delays None known at this time

## 2024-01-13 NOTE — NURSING
Patient discharged per MD orders.  Daughter at bedside to interpret.  Patient verbalized understanding of all instructions given.  C/O pain in ribs.  One time dose of Tramadol administered.  Patient continued to C/O pain.  Dr. Ng notified.  Patient educated on the fact that the pain is associated with a previous injury.  That oral pain medications and OT/PT were ordered for discharge.  Verbalized understanding.

## 2024-01-13 NOTE — ASSESSMENT & PLAN NOTE
Atypical chest pain in a patient with multiple risk factors for coronary artery disease and abnormal EKG.  Echo showed no large wall motion abnormalities.  Further evaluation with the help of stress test. (may be done as an outpatient if patient unwilling to stay in-house till Tuesday)

## 2024-01-13 NOTE — ASSESSMENT & PLAN NOTE
Malou Suarez is a 57 yo M with DM and HTN who presents with resolving/resolved LUQ and left chest wall pain. During physical exam, he states that his left chest wall was the primary location of this pain and his abdominal exam is benign. It would be very unusual for acute cholecystitis to present with left sided chest pain/LUQ pain that resolves without any antibiotics or surgery. CT has some soft signs of cholecystitis and cholelithiasis.     - admitted to the hospital medicine service  - HIDA shows filling of the gallbladder, no acute cholecystitis, pain resolved, tolerating diet  - ongoing cardiology workup  - patient can follow up with surgery as outpatient  - surgery will sign off at this time, please reach back out with further questions or concerns about the care of this patient

## 2024-01-13 NOTE — NURSING
Pt sitting up in bed, alert and oriented. Sons at bedside. PRN medication given for pain. Cont IVF. Safety measures maintained. Will cont to monitor.

## 2024-01-13 NOTE — PROGRESS NOTES
"[facility] Medicine  Progress Note    Patient Name: Malou Suarez  MRN: [id number]  Patient Class: IP- Inpatient   Admission Date: 1/11/2024  Length of Stay: 1 days  Attending Physician: Tello Ng MD  Primary Care Provider: Daniella Leal MD        Subjective:     Principal Problem:Left upper quadrant pain        HPI:  58-year-old male with a history of diabetes presenting with left upper quadrant abdominal pain associated nausea and vomiting lasting for about an hour to 2 hours.  Patient reports he had similar symptoms on Sunday.  Denies chest pain, shortness of breath, fevers, chills, diarrhea, constipation.  Notes multiple episodes of nausea and vomiting.  Reports symptoms started this morning. CT abdomen show possible cholecystitis, surgery is consulted,will have HIDA scan today.      Overview/Hospital Course:  Presented with LUQ pain. Atypical chest pains with WMA on ECHO, will need Stress test. CT showing gallbladder wall thickening particular involving the gallbladder fundus with pericholecystic edema or trace fluid; HIDA scan showing patent ducts. Positive cologaurd screen this time last year, no f/u colonoscopy outpt on record, will check CEA and patient will need outpt C-scope.       Interval History:  NAEON.  No new issues.   Denies complaints.  All questions answered and updates on care given.       ROS:  General: Negative for fevers or chills.  Pulmonary: Negative for dyspnea or wheezing.       Vitals:    01/12/24 2252 01/12/24 2343 01/13/24 0512 01/13/24 0728   BP: (!) 154/72  (!) 114/56 138/67   BP Location:    Right arm   Patient Position: Lying  Lying Lying   Pulse: (!) 56  (!) 58 61   Resp: 18 18 18 16   Temp: 97.8 °F (36.6 °C)  97.8 °F (36.6 °C) 98.3 °F (36.8 °C)   TempSrc: Oral  Oral Oral   SpO2: 96%  96% 95%   Weight:    70.3 kg (154 lb 15.4 oz)   Height:    5' 5" (1.651 m)          Body mass index is 25.79 kg/m².      PHYSICAL EXAM:  GENERAL APPEARANCE: alert and cooperative, " and appears to be in no acute distress.  HEENT:     HEAD: NC/AT     EYES: PERRL, EOMI.  Vision is grossly intact.  NECK: Neck supple, non-tender without LAD, masses or thyromegaly.  CARDIAC: There is no peripheral edema, cyanosis or pallor.   LUNGS: Clear to auscultation and percussion without rales or wheezing  ABDOMEN: Non-distended. No guarding.  MSK: No joint erythema or tenderness.   EXTREMITIES: No significant deformity or joint abnormality. No edema.   NEUROLOGICAL: CN II-XII grossly intact.   SKIN: Skin normal color, texture and turgor with no lesions or eruptions.  PSYCHIATRIC: Oriented. No tangential speech. No Hyperactive features.          Recent Results (from the past 24 hour(s))   Troponin I    Collection Time: 01/12/24 12:20 PM   Result Value Ref Range    Troponin I <0.006 0.000 - 0.026 ng/mL   Echo    Collection Time: 01/12/24  2:16 PM   Result Value Ref Range    BSA 1.8 m2    LVOT stroke volume 77.64 cm3    LVIDd 5.10 3.5 - 6.0 cm    LV Systolic Volume 53.02 mL    LV Systolic Volume Index 30.0 mL/m2    LVIDs 3.56 2.1 - 4.0 cm    LV Diastolic Volume 123.62 mL    LV Diastolic Volume Index 69.84 mL/m2    IVS 0.90 0.6 - 1.1 cm    LVOT diameter 2.13 cm    LVOT area 3.6 cm2    FS 30 28 - 44 %    Left Ventricle Relative Wall Thickness 0.35 cm    Posterior Wall 0.88 0.6 - 1.1 cm    LV mass 161.18 g    LV Mass Index 91 g/m2    MV Peak E Rafiq 0.77 m/s    TDI LATERAL 0.10 m/s    TDI SEPTAL 0.05 m/s    E/E' ratio 10.27 m/s    MV Peak A Rafiq 0.74 m/s    TR Max Rafiq 1.73 m/s    E/A ratio 1.04     IVRT 111.32 msec    E wave deceleration time 194.17 msec    LV SEPTAL E/E' RATIO 15.40 m/s    LV LATERAL E/E' RATIO 7.70 m/s    PV Peak S Rafiq 0.44 m/s    PV Peak D Rafiq 0.45 m/s    Pulm vein S/D ratio 0.98     LVOT peak rafiq 0.95 m/s    Left Ventricular Outflow Tract Mean Velocity 0.61 cm/s    Left Ventricular Outflow Tract Mean Gradient 1.78 mmHg    RVDD 4.50 cm    TAPSE 1.82 cm    LA size 3.90 cm    Left Atrium Minor Axis  4.28 cm    RA Major Axis 4.86 cm    RA Width 4.29 cm    AV mean gradient 4 mmHg    AV peak gradient 6 mmHg    Ao peak aleksey 1.27 m/s    Ao VTI 30.70 cm    LVOT peak VTI 21.80 cm    AV valve area 2.53 cm²    AV Velocity Ratio 0.75     AV index (prosthetic) 0.71     HAYLEE by Velocity Ratio 2.66 cm²    MV stenosis pressure 1/2 time 56.31 ms    MV valve area p 1/2 method 3.91 cm2    TV peak gradient 1 mmHg    Triscuspid Valve Regurgitation Peak Gradient 12 mmHg    PV PEAK VELOCITY 0.92 m/s    PV peak gradient 3 mmHg    Sinus 3.24 cm    STJ 2.70 cm    Ascending aorta 3.31 cm    IVC diameter 1.45 cm    Mean e' 0.08 m/s    ZLVIDS 1.29     ZLVIDD 0.41     LA Volume Index 37.1 mL/m2    LA volume 65.74 cm3    Left Atrium Major Axis 5.0 cm    LA WIDTH 4.3 cm    TV resting pulmonary artery pressure 15 mmHg    RV TB RVSP 5 mmHg    Est. RA pres 3 mmHg   POCT glucose    Collection Time: 01/12/24  4:42 PM   Result Value Ref Range    POCT Glucose 143 (H) 70 - 110 mg/dL   POCT glucose    Collection Time: 01/12/24  7:58 PM   Result Value Ref Range    POCT Glucose 188 (H) 70 - 110 mg/dL   POCT glucose    Collection Time: 01/13/24  7:28 AM   Result Value Ref Range    POCT Glucose 91 70 - 110 mg/dL       Microbiology Results (last 7 days)       ** No results found for the last 168 hours. **             Imaging Results              NM Hepatobiliary Scan (HIDA) (Final result)  Result time 01/12/24 14:32:07      Final result by Chintan Anders MD (01/12/24 14:32:07)                   Impression:      The cystic and common jermaine ducts are patent (noting presence of gallbladder activity on delayed 3 hour images).      Electronically signed by: Chintan Anders  Date:    01/12/2024  Time:    14:32               Narrative:    EXAMINATION:  NM HEPATOBILIARY IMAGING INC GB (HIDA)    CLINICAL HISTORY:  Cholecystitis;    TECHNIQUE:  Following the IV administration of 5.5 mCi of Tc-99m-mebrofenin, sequential dynamic anterior images of the abdomen  were obtained for 60 minutes followed by a right lateral view at 60 minutes.  Additional 3 hour delayed anterior and lateral images were obtained.    COMPARISON:  None    FINDINGS:  The early images demonstrate homogeneous uptake of the radiopharmaceutical by the liver.  No focal hepatic abnormality.    Activity is present in the common bile duct and small bowel on initial static 60 minute images.  Gallbladder activity was not initially present though is seen on subsequent delayed 3 hour delayed images.    The clearance from the liver is appropriate.                                        CT Abdomen Pelvis With IV Contrast NO Oral Contrast (Final result)  Result time 01/12/24 04:00:08      Final result by Vipin Cottrell MD (01/12/24 04:00:08)                   Impression:      Findings suggestive for acute cholecystitis, as above, with 5 mm calculus at the gallbladder neck.    No bile duct dilatation.    This report was flagged in Epic as abnormal.      Electronically signed by: Vipin Cottrell MD  Date:    01/12/2024  Time:    04:00               Narrative:    EXAMINATION:  CT ABDOMEN PELVIS WITH IV CONTRAST    CLINICAL HISTORY:  Abdominal abscess/infection suspected;Nausea/vomiting;Pancreatitis, acute, severe;    TECHNIQUE:  Low dose axial images, sagittal and coronal reformations were obtained from the lung bases to the pubic symphysis following the IV administration of 80 mL of Omnipaque 350 .  Oral contrast was not administered.    COMPARISON:  None.    FINDINGS:  Abdomen:    - Lower thorax:Unremarkable.    - Lung bases: No infiltrates and no nodules.    - Liver: No focal mass.    - Gallbladder: Mildly distended gallbladder.  Gallbladder wall thickening particular involving the gallbladder fundus with pericholecystic edema or trace fluid.  A few small calcified stones identified in the fundus.  5 mm calculus identified in the gallbladder neck..    - Bile Ducts: No evidence of intra or extra hepatic biliary  ductal dilation.    - Spleen: Negative.    - Kidneys: No mass or hydronephrosis.    - Adrenals: Unremarkable.    - Pancreas: No mass or peripancreatic fat stranding.    - Retroperitoneum:  No significant adenopathy.    - Vascular: No abdominal aortic aneurysm.  Moderate aortoiliac calcific atherosclerosis.    - Abdominal wall:  Subcutaneous edema in the lower abdominal wall suggesting injection sites.    Pelvis:    No pelvic mass, adenopathy, or free fluid.    Bowel/Mesentery:    No evidence of bowel obstruction or inflammation.  Normal appendix.    Bones:  Remote fractures of right posterolateral 6th, 7th and 8th ribs.                                             Assessment/Plan:      * Left upper quadrant pain  CT abdomen show possible cholecystitis  surgery is consulted  will have HIDA scan- patent ducts  Nausea, vomiting is resolved already- advance diet    Positive colorectal cancer screening using Cologuard test  +this time last year, w no f/u C-scope on record  Will check CEA  C-scope outpt      Other chest pain  ECHO with WMA  Card consulted  Stress test        VTE Risk Mitigation (From admission, onward)      None            Discharge Planning   ADRIA:      Code Status: Not on file   Is the patient medically ready for discharge?:     Reason for patient still in hospital (select all that apply): Patient trending condition and Treatment  Discharge Plan A: Home with family                  Tello Ng MD  Department of Hospital Medicine   [facility] [place] [facility] Surg

## 2024-01-13 NOTE — SUBJECTIVE & OBJECTIVE
Past Medical History:   Diagnosis Date    Diabetes mellitus        History reviewed. No pertinent surgical history.    Review of patient's allergies indicates:  No Known Allergies    No current facility-administered medications on file prior to encounter.     Current Outpatient Medications on File Prior to Encounter   Medication Sig    alogliptin-pioglitazone 12.5-15 mg Tab Take by mouth once daily.    atorvastatin (LIPITOR) 20 MG tablet Take 20 mg by mouth once daily.    BASAGLAR KWIKPEN U-100 INSULIN glargine 100 units/mL SubQ pen SMARTSI Unit(s) SUB-Q Every Night    empagliflozin 10 mg Tab Take 10 mg by mouth once daily.    ergocalciferol (ERGOCALCIFEROL) 50,000 unit Cap Take 50,000 Units by mouth every 7 days.    insulin aspart protamine-insulin aspart (NOVOLOG 70/30) 100 unit/mL (70-30) InPn pen Inject into the skin 2 (two) times daily before meals.    insulin glargine (LANTUS) 100 unit/mL injection Inject into the skin every evening.    JANUMET -1,000 mg TM24 Take 1 tablet by mouth every morning.    losartan (COZAAR) 25 MG tablet Take 25 mg by mouth.    VEMLIDY 25 mg Tab Take 1 tablet by mouth.     Family History    None       Tobacco Use    Smoking status: Former    Smokeless tobacco: Not on file   Substance and Sexual Activity    Alcohol use: No    Drug use: No    Sexual activity: Not on file     Review of Systems   Constitutional: Negative.   HENT: Negative.     Eyes: Negative.    Cardiovascular:  Positive for chest pain.   Respiratory: Negative.     Endocrine: Negative.    Hematologic/Lymphatic: Negative.    Skin: Negative.    Musculoskeletal: Negative.    Gastrointestinal: Negative.    Genitourinary: Negative.    Neurological: Negative.    Psychiatric/Behavioral: Negative.     Allergic/Immunologic: Negative.      Objective:     Vital Signs (Most Recent):  Temp: 97.9 °F (36.6 °C) (24)  Pulse: (!) 59 (24)  Resp: 18 (24)  BP: 138/68 (24)  SpO2: 99 %  "(01/12/24 1957) Vital Signs (24h Range):  Temp:  [97.5 °F (36.4 °C)-98.4 °F (36.9 °C)] 97.9 °F (36.6 °C)  Pulse:  [46-66] 59  Resp:  [12-20] 18  SpO2:  [95 %-100 %] 99 %  BP: (109-151)/(55-72) 138/68     Weight: 70.3 kg (154 lb 15.4 oz)  Body mass index is 25.79 kg/m².    SpO2: 99 %         Intake/Output Summary (Last 24 hours) at 1/12/2024 2213  Last data filed at 1/12/2024 1730  Gross per 24 hour   Intake 480 ml   Output --   Net 480 ml       Lines/Drains/Airways       Peripheral Intravenous Line  Duration                  Peripheral IV - Single Lumen 01/12/24 0328 20 G Posterior;Right Hand <1 day                     Physical Exam  Vitals reviewed.   Constitutional:       Appearance: He is well-developed.   HENT:      Head: Normocephalic.   Eyes:      Conjunctiva/sclera: Conjunctivae normal.      Pupils: Pupils are equal, round, and reactive to light.   Cardiovascular:      Rate and Rhythm: Normal rate and regular rhythm.      Heart sounds: Normal heart sounds.   Pulmonary:      Effort: Pulmonary effort is normal.      Breath sounds: Normal breath sounds.   Abdominal:      General: Bowel sounds are normal.      Palpations: Abdomen is soft.   Musculoskeletal:      Cervical back: Normal range of motion and neck supple.   Skin:     General: Skin is warm.   Neurological:      Mental Status: He is alert and oriented to person, place, and time.          Significant Labs: BMP:   Recent Labs   Lab 01/11/24  2300   *      K 3.4*      CO2 25   BUN 16   CREATININE 0.8   CALCIUM 8.5*   , CMP   Recent Labs   Lab 01/11/24  2300      K 3.4*      CO2 25   *   BUN 16   CREATININE 0.8   CALCIUM 8.5*   PROT 7.1   ALBUMIN 3.8   BILITOT 0.4   ALKPHOS 41*   AST 18   ALT 60*   ANIONGAP 10   , CBC   Recent Labs   Lab 01/11/24  2300   WBC 10.87   HGB 14.5   HCT 42.2      , INR No results for input(s): "INR", "PROTIME" in the last 48 hours., Lipid Panel No results for input(s): "CHOL", "HDL", " ""LDLCALC", "TRIG", "CHOLHDL" in the last 48 hours., Troponin   Recent Labs   Lab 01/11/24  2300 01/12/24  1220   TROPONINI 0.006 <0.006   , and All pertinent lab results from the last 24 hours have been reviewed.    Significant Imaging: Echocardiogram: Transthoracic echo (TTE) complete (Cupid Only):   Results for orders placed or performed during the hospital encounter of 01/11/24   Echo   Result Value Ref Range    BSA 1.8 m2    LVOT stroke volume 77.64 cm3    LVIDd 5.10 3.5 - 6.0 cm    LV Systolic Volume 53.02 mL    LV Systolic Volume Index 30.0 mL/m2    LVIDs 3.56 2.1 - 4.0 cm    LV Diastolic Volume 123.62 mL    LV Diastolic Volume Index 69.84 mL/m2    IVS 0.90 0.6 - 1.1 cm    LVOT diameter 2.13 cm    LVOT area 3.6 cm2    FS 30 28 - 44 %    Left Ventricle Relative Wall Thickness 0.35 cm    Posterior Wall 0.88 0.6 - 1.1 cm    LV mass 161.18 g    LV Mass Index 91 g/m2    MV Peak E Rafiq 0.77 m/s    TDI LATERAL 0.10 m/s    TDI SEPTAL 0.05 m/s    E/E' ratio 10.27 m/s    MV Peak A Rafiq 0.74 m/s    TR Max Rafiq 1.73 m/s    E/A ratio 1.04     IVRT 111.32 msec    E wave deceleration time 194.17 msec    LV SEPTAL E/E' RATIO 15.40 m/s    LV LATERAL E/E' RATIO 7.70 m/s    PV Peak S Rafiq 0.44 m/s    PV Peak D Rafiq 0.45 m/s    Pulm vein S/D ratio 0.98     LVOT peak rafiq 0.95 m/s    Left Ventricular Outflow Tract Mean Velocity 0.61 cm/s    Left Ventricular Outflow Tract Mean Gradient 1.78 mmHg    RVDD 4.50 cm    TAPSE 1.82 cm    LA size 3.90 cm    Left Atrium Minor Axis 4.28 cm    RA Major Axis 4.86 cm    RA Width 4.29 cm    AV mean gradient 4 mmHg    AV peak gradient 6 mmHg    Ao peak rafiq 1.27 m/s    Ao VTI 30.70 cm    LVOT peak VTI 21.80 cm    AV valve area 2.53 cm²    AV Velocity Ratio 0.75     AV index (prosthetic) 0.71     HAYLEE by Velocity Ratio 2.66 cm²    MV stenosis pressure 1/2 time 56.31 ms    MV valve area p 1/2 method 3.91 cm2    TV peak gradient 1 mmHg    Triscuspid Valve Regurgitation Peak Gradient 12 mmHg    PV PEAK " VELOCITY 0.92 m/s    PV peak gradient 3 mmHg    Sinus 3.24 cm    STJ 2.70 cm    Ascending aorta 3.31 cm    IVC diameter 1.45 cm    Mean e' 0.08 m/s    ZLVIDS 1.29     ZLVIDD 0.41     LA Volume Index 37.1 mL/m2    LA volume 65.74 cm3    Left Atrium Major Axis 5.0 cm    LA WIDTH 4.3 cm    TV resting pulmonary artery pressure 15 mmHg    RV TB RVSP 5 mmHg    Est. RA pres 3 mmHg    Narrative      Left Ventricle: The left ventricle is normal in size. Normal wall   thickness. Normal wall motion. There is normal systolic function with a   visually estimated ejection fraction of 55 - 60%. Grade I diastolic   dysfunction.    Right Ventricle: Normal right ventricular cavity size. Systolic   function is normal.    Left Atrium: Left atrium is moderately dilated.    Aortic Valve: The aortic valve is a trileaflet valve.    Mitral Valve: There is mild regurgitation.    Tricuspid Valve: There is mild regurgitation.    Pulmonary Artery: The estimated pulmonary artery systolic pressure is   15 mmHg.    IVC/SVC: Normal venous pressure at 3 mmHg.

## 2024-01-14 PROBLEM — J43.9 PULMONARY EMPHYSEMA: Status: ACTIVE | Noted: 2024-01-14

## 2024-01-14 PROBLEM — E78.5 HYPERLIPIDEMIA: Status: ACTIVE | Noted: 2024-01-14

## 2024-01-14 PROBLEM — R52 INTRACTABLE PAIN: Status: ACTIVE | Noted: 2024-01-14

## 2024-01-14 PROBLEM — R52 INTRACTABLE PAIN: Status: RESOLVED | Noted: 2024-01-14 | Resolved: 2024-01-14

## 2024-01-14 PROBLEM — E11.9 TYPE 2 DIABETES MELLITUS: Status: ACTIVE | Noted: 2024-01-14

## 2024-01-14 PROBLEM — R79.89 ELEVATED D-DIMER: Status: ACTIVE | Noted: 2024-01-14

## 2024-01-14 PROBLEM — I10 HYPERTENSION: Status: ACTIVE | Noted: 2024-01-14

## 2024-01-14 PROBLEM — M54.6 ACUTE LEFT-SIDED THORACIC BACK PAIN: Status: ACTIVE | Noted: 2024-01-14

## 2024-01-14 PROBLEM — J43.9 PULMONARY EMPHYSEMA: Status: RESOLVED | Noted: 2024-01-14 | Resolved: 2024-01-14

## 2024-01-14 LAB
ALBUMIN SERPL BCP-MCNC: 3.5 G/DL (ref 3.5–5.2)
ALP SERPL-CCNC: 41 U/L (ref 55–135)
ALT SERPL W/O P-5'-P-CCNC: 35 U/L (ref 10–44)
ANION GAP SERPL CALC-SCNC: 11 MMOL/L (ref 8–16)
AST SERPL-CCNC: 19 U/L (ref 10–40)
BASOPHILS # BLD AUTO: 0.03 K/UL (ref 0–0.2)
BASOPHILS NFR BLD: 0.3 % (ref 0–1.9)
BILIRUB SERPL-MCNC: 1.1 MG/DL (ref 0.1–1)
BUN SERPL-MCNC: 23 MG/DL (ref 6–20)
CALCIUM SERPL-MCNC: 8.4 MG/DL (ref 8.7–10.5)
CHLORIDE SERPL-SCNC: 105 MMOL/L (ref 95–110)
CO2 SERPL-SCNC: 22 MMOL/L (ref 23–29)
CREAT SERPL-MCNC: 0.8 MG/DL (ref 0.5–1.4)
DIFFERENTIAL METHOD BLD: ABNORMAL
EOSINOPHIL # BLD AUTO: 0 K/UL (ref 0–0.5)
EOSINOPHIL NFR BLD: 0.1 % (ref 0–8)
ERYTHROCYTE [DISTWIDTH] IN BLOOD BY AUTOMATED COUNT: 13 % (ref 11.5–14.5)
EST. GFR  (NO RACE VARIABLE): >60 ML/MIN/1.73 M^2
GLUCOSE SERPL-MCNC: 196 MG/DL (ref 70–110)
HCT VFR BLD AUTO: 41.1 % (ref 40–54)
HGB BLD-MCNC: 13.5 G/DL (ref 14–18)
IMM GRANULOCYTES # BLD AUTO: 0.04 K/UL (ref 0–0.04)
IMM GRANULOCYTES NFR BLD AUTO: 0.3 % (ref 0–0.5)
LYMPHOCYTES # BLD AUTO: 1 K/UL (ref 1–4.8)
LYMPHOCYTES NFR BLD: 8.4 % (ref 18–48)
MAGNESIUM SERPL-MCNC: 1.6 MG/DL (ref 1.6–2.6)
MCH RBC QN AUTO: 28.9 PG (ref 27–31)
MCHC RBC AUTO-ENTMCNC: 32.8 G/DL (ref 32–36)
MCV RBC AUTO: 88 FL (ref 82–98)
MONOCYTES # BLD AUTO: 0.8 K/UL (ref 0.3–1)
MONOCYTES NFR BLD: 6.9 % (ref 4–15)
NEUTROPHILS # BLD AUTO: 9.8 K/UL (ref 1.8–7.7)
NEUTROPHILS NFR BLD: 84 % (ref 38–73)
NRBC BLD-RTO: 0 /100 WBC
PHOSPHATE SERPL-MCNC: 3.4 MG/DL (ref 2.7–4.5)
PLATELET # BLD AUTO: 221 K/UL (ref 150–450)
PMV BLD AUTO: 10.1 FL (ref 9.2–12.9)
POCT GLUCOSE: 174 MG/DL (ref 70–110)
POCT GLUCOSE: 193 MG/DL (ref 70–110)
POCT GLUCOSE: 194 MG/DL (ref 70–110)
POCT GLUCOSE: 204 MG/DL (ref 70–110)
POCT GLUCOSE: 212 MG/DL (ref 70–110)
POTASSIUM SERPL-SCNC: 3.6 MMOL/L (ref 3.5–5.1)
PROT SERPL-MCNC: 7 G/DL (ref 6–8.4)
RBC # BLD AUTO: 4.67 M/UL (ref 4.6–6.2)
SODIUM SERPL-SCNC: 138 MMOL/L (ref 136–145)
WBC # BLD AUTO: 11.69 K/UL (ref 3.9–12.7)

## 2024-01-14 PROCEDURE — 36415 COLL VENOUS BLD VENIPUNCTURE: CPT

## 2024-01-14 PROCEDURE — 83036 HEMOGLOBIN GLYCOSYLATED A1C: CPT

## 2024-01-14 PROCEDURE — 63600175 PHARM REV CODE 636 W HCPCS

## 2024-01-14 PROCEDURE — G0378 HOSPITAL OBSERVATION PER HR: HCPCS

## 2024-01-14 PROCEDURE — 25000003 PHARM REV CODE 250: Performed by: STUDENT IN AN ORGANIZED HEALTH CARE EDUCATION/TRAINING PROGRAM

## 2024-01-14 PROCEDURE — 80053 COMPREHEN METABOLIC PANEL: CPT

## 2024-01-14 PROCEDURE — 84100 ASSAY OF PHOSPHORUS: CPT

## 2024-01-14 PROCEDURE — 96376 TX/PRO/DX INJ SAME DRUG ADON: CPT

## 2024-01-14 PROCEDURE — 93005 ELECTROCARDIOGRAM TRACING: CPT

## 2024-01-14 PROCEDURE — 63600175 PHARM REV CODE 636 W HCPCS: Performed by: STUDENT IN AN ORGANIZED HEALTH CARE EDUCATION/TRAINING PROGRAM

## 2024-01-14 PROCEDURE — 25000003 PHARM REV CODE 250

## 2024-01-14 PROCEDURE — 83735 ASSAY OF MAGNESIUM: CPT

## 2024-01-14 PROCEDURE — 96372 THER/PROPH/DIAG INJ SC/IM: CPT

## 2024-01-14 PROCEDURE — 93010 ELECTROCARDIOGRAM REPORT: CPT | Mod: ,,, | Performed by: INTERNAL MEDICINE

## 2024-01-14 PROCEDURE — 96375 TX/PRO/DX INJ NEW DRUG ADDON: CPT

## 2024-01-14 PROCEDURE — 97165 OT EVAL LOW COMPLEX 30 MIN: CPT

## 2024-01-14 PROCEDURE — 85025 COMPLETE CBC W/AUTO DIFF WBC: CPT

## 2024-01-14 RX ORDER — BISACODYL 10 MG/1
10 SUPPOSITORY RECTAL DAILY PRN
Status: DISCONTINUED | OUTPATIENT
Start: 2024-01-14 | End: 2024-01-18 | Stop reason: HOSPADM

## 2024-01-14 RX ORDER — POLYETHYLENE GLYCOL 3350 17 G/17G
34 POWDER, FOR SOLUTION ORAL ONCE
Status: COMPLETED | OUTPATIENT
Start: 2024-01-14 | End: 2024-01-14

## 2024-01-14 RX ORDER — SYRING-NEEDL,DISP,INSUL,0.3 ML 29 G X1/2"
296 SYRINGE, EMPTY DISPOSABLE MISCELLANEOUS
Status: COMPLETED | OUTPATIENT
Start: 2024-01-14 | End: 2024-01-14

## 2024-01-14 RX ORDER — METHOCARBAMOL 750 MG/1
750 TABLET, FILM COATED ORAL 3 TIMES DAILY PRN
Status: DISPENSED | OUTPATIENT
Start: 2024-01-14 | End: 2024-01-15

## 2024-01-14 RX ORDER — PSEUDOEPHEDRINE/ACETAMINOPHEN 30MG-500MG
100 TABLET ORAL
Status: COMPLETED | OUTPATIENT
Start: 2024-01-14 | End: 2024-01-14

## 2024-01-14 RX ORDER — HYDROMORPHONE HYDROCHLORIDE 1 MG/ML
0.5 INJECTION, SOLUTION INTRAMUSCULAR; INTRAVENOUS; SUBCUTANEOUS
Status: DISCONTINUED | OUTPATIENT
Start: 2024-01-14 | End: 2024-01-15

## 2024-01-14 RX ORDER — METHOCARBAMOL 750 MG/1
750 TABLET, FILM COATED ORAL 3 TIMES DAILY PRN
Status: DISCONTINUED | OUTPATIENT
Start: 2024-01-14 | End: 2024-01-14

## 2024-01-14 RX ORDER — LOSARTAN POTASSIUM 25 MG/1
25 TABLET ORAL DAILY
Status: DISCONTINUED | OUTPATIENT
Start: 2024-01-14 | End: 2024-01-16

## 2024-01-14 RX ORDER — DEXTROMETHORPHAN POLISTIREX 30 MG/5 ML
1 SUSPENSION, EXTENDED RELEASE 12 HR ORAL ONCE AS NEEDED
Status: COMPLETED | OUTPATIENT
Start: 2024-01-14 | End: 2024-01-14

## 2024-01-14 RX ORDER — ATORVASTATIN CALCIUM 40 MG/1
40 TABLET, FILM COATED ORAL DAILY
Status: DISCONTINUED | OUTPATIENT
Start: 2024-01-14 | End: 2024-01-18 | Stop reason: HOSPADM

## 2024-01-14 RX ORDER — AMOXICILLIN 250 MG
1 CAPSULE ORAL 2 TIMES DAILY
Status: DISCONTINUED | OUTPATIENT
Start: 2024-01-14 | End: 2024-01-15

## 2024-01-14 RX ORDER — HYDROMORPHONE HYDROCHLORIDE 1 MG/ML
2 INJECTION, SOLUTION INTRAMUSCULAR; INTRAVENOUS; SUBCUTANEOUS EVERY 4 HOURS PRN
Status: DISCONTINUED | OUTPATIENT
Start: 2024-01-14 | End: 2024-01-14

## 2024-01-14 RX ORDER — LIDOCAINE 50 MG/G
1 PATCH TOPICAL
Status: DISPENSED | OUTPATIENT
Start: 2024-01-14 | End: 2024-01-16

## 2024-01-14 RX ORDER — ASPIRIN 81 MG/1
81 TABLET ORAL DAILY
Status: DISCONTINUED | OUTPATIENT
Start: 2024-01-14 | End: 2024-01-18 | Stop reason: HOSPADM

## 2024-01-14 RX ORDER — HYDROMORPHONE HYDROCHLORIDE 1 MG/ML
1 INJECTION, SOLUTION INTRAMUSCULAR; INTRAVENOUS; SUBCUTANEOUS EVERY 4 HOURS PRN
Status: DISCONTINUED | OUTPATIENT
Start: 2024-01-14 | End: 2024-01-14

## 2024-01-14 RX ORDER — POLYETHYLENE GLYCOL 3350 17 G/17G
17 POWDER, FOR SOLUTION ORAL 2 TIMES DAILY PRN
Status: DISCONTINUED | OUTPATIENT
Start: 2024-01-14 | End: 2024-01-18 | Stop reason: HOSPADM

## 2024-01-14 RX ADMIN — METHOCARBAMOL 750 MG: 750 TABLET ORAL at 10:01

## 2024-01-14 RX ADMIN — POLYETHYLENE GLYCOL 3350 34 G: 17 POWDER, FOR SOLUTION ORAL at 01:01

## 2024-01-14 RX ADMIN — ATORVASTATIN CALCIUM 40 MG: 40 TABLET, FILM COATED ORAL at 09:01

## 2024-01-14 RX ADMIN — INSULIN DETEMIR 10 UNITS: 100 INJECTION, SOLUTION SUBCUTANEOUS at 09:01

## 2024-01-14 RX ADMIN — Medication 100 ML: at 04:01

## 2024-01-14 RX ADMIN — HYDROMORPHONE HYDROCHLORIDE 0.5 MG: 1 INJECTION, SOLUTION INTRAMUSCULAR; INTRAVENOUS; SUBCUTANEOUS at 03:01

## 2024-01-14 RX ADMIN — BE HEALTH MAGNESIUM CITRATE ORAL SOLUTION - LEMON 296 ML: 1.75 LIQUID ORAL at 04:01

## 2024-01-14 RX ADMIN — HYDROMORPHONE HYDROCHLORIDE 0.5 MG: 1 INJECTION, SOLUTION INTRAMUSCULAR; INTRAVENOUS; SUBCUTANEOUS at 07:01

## 2024-01-14 RX ADMIN — HYDROMORPHONE HYDROCHLORIDE 1 MG: 1 INJECTION, SOLUTION INTRAMUSCULAR; INTRAVENOUS; SUBCUTANEOUS at 01:01

## 2024-01-14 RX ADMIN — HYDROMORPHONE HYDROCHLORIDE 0.5 MG: 1 INJECTION, SOLUTION INTRAMUSCULAR; INTRAVENOUS; SUBCUTANEOUS at 12:01

## 2024-01-14 RX ADMIN — HYDROMORPHONE HYDROCHLORIDE 0.5 MG: 1 INJECTION, SOLUTION INTRAMUSCULAR; INTRAVENOUS; SUBCUTANEOUS at 10:01

## 2024-01-14 RX ADMIN — INSULIN ASPART 4 UNITS: 100 INJECTION, SOLUTION INTRAVENOUS; SUBCUTANEOUS at 01:01

## 2024-01-14 RX ADMIN — INSULIN DETEMIR 10 UNITS: 100 INJECTION, SOLUTION SUBCUTANEOUS at 02:01

## 2024-01-14 RX ADMIN — HYDROMORPHONE HYDROCHLORIDE 0.5 MG: 1 INJECTION, SOLUTION INTRAMUSCULAR; INTRAVENOUS; SUBCUTANEOUS at 08:01

## 2024-01-14 RX ADMIN — SENNOSIDES AND DOCUSATE SODIUM 1 TABLET: 50; 8.6 TABLET ORAL at 01:01

## 2024-01-14 RX ADMIN — SENNOSIDES AND DOCUSATE SODIUM 1 TABLET: 50; 8.6 TABLET ORAL at 09:01

## 2024-01-14 RX ADMIN — LOSARTAN POTASSIUM 25 MG: 25 TABLET, FILM COATED ORAL at 09:01

## 2024-01-14 RX ADMIN — LIDOCAINE 5% 1 PATCH: 700 PATCH TOPICAL at 03:01

## 2024-01-14 RX ADMIN — MINERAL OIL 1 ENEMA: 100 ENEMA RECTAL at 04:01

## 2024-01-14 RX ADMIN — METHOCARBAMOL 750 MG: 750 TABLET ORAL at 03:01

## 2024-01-14 RX ADMIN — HYDROMORPHONE HYDROCHLORIDE 1 MG: 1 INJECTION, SOLUTION INTRAMUSCULAR; INTRAVENOUS; SUBCUTANEOUS at 05:01

## 2024-01-14 RX ADMIN — ASPIRIN 81 MG: 81 TABLET, COATED ORAL at 09:01

## 2024-01-14 RX ADMIN — INSULIN ASPART 1 UNITS: 100 INJECTION, SOLUTION INTRAVENOUS; SUBCUTANEOUS at 09:01

## 2024-01-14 RX ADMIN — SODIUM CHLORIDE 500 ML: 9 INJECTION, SOLUTION INTRAVENOUS at 04:01

## 2024-01-14 NOTE — H&P
[facility] Medicine  History & Physical    Patient Name: Malou Suarez  MRN: [id number]  Patient Class: OP- Observation  Admission Date: 1/13/2024  Attending Physician: Tello Ng MD   Primary Care Provider: Daniella Leal MD         Patient information was obtained from patient, past medical records, and ER records.     Subjective:     Principal Problem:Left upper quadrant pain    Chief Complaint:   Chief Complaint   Patient presents with    Abdominal Pain     LUQ pain radiating to back worsening today. Reports was admitted at this hospital and discharged earlier for same. Denies N/V/D        HPI: Malou Suarez is a 58 y.o. with a pmh of DM and HTN presents to the hospital with complaints of left sided chest pain with radiation to the back on the left. Patient was recently admitted with left upper quadrant pain associated with n/v and seen by both Surgery and Cardiology. Surgery cleared patient as HIDA scan showed no acute cholecystitis.  Cardiology also cleared patient for atypical chest pain with follow-up stress test outpatient. He was discharged on 01/13/2024 from the hospital and returned the same day to the ED similar complaints. He states that he was pain-free upon discharge but pain returned at home which prompted his return.  He describes pain as squeezing and sharp on the left side of his chest which radiates to his left back mostly in the thoracic region. Patient states that he is unable to tolerate anything by mouth due to his pain. Patient took the pain medicine given to him on discharge without much relief. He denies any alleviating or exacerbating factors. Patient denies headache, fever, blurry vision, nausea, vomiting, diarrhea, melena, hematemesis, hematochezia, hematuria, or any other associated symptoms. Of note he states that he did not take his blood pressure and diabetes medications prior to arrival.     service was used due to language barrier.  ID#  [id number]    In the ED: Patient is afebrile without leukocytosis, hypertensive on presentation 189/75, 98 % O2 sat on RA, CBC unremarkable, D-dimer 0.5, magnesium 1.5, ALP 41, troponin normal, POCT glucose 250, CTA chest (1) no evidence of PE no acute intrathoracic abnormality 2) mild to moderate pulmonary emphysema, EKG shows sinus bradycardia, no STEMI at 58 bpm. Patient given cyclobenzaprine 10 mg, ketorolac 15 mg IV, morphine 4 mg IV x2 in the ED.    Case discussed with ED provider.    Maloupaco Suarez we will be placed under observation for further medical management of his intractable pain.    Past Medical History:   Diagnosis Date    Diabetes mellitus        No past surgical history on file.    Review of patient's allergies indicates:  No Known Allergies    Current Facility-Administered Medications on File Prior to Encounter   Medication    [COMPLETED] magnesium sulfate 2g in water 50mL IVPB (premix)    [COMPLETED] morphine injection 4 mg    [COMPLETED] traMADoL tablet 100 mg    [DISCONTINUED] 0.9%  NaCl infusion    [DISCONTINUED] acetaminophen tablet 650 mg    [DISCONTINUED] dextrose 10% bolus 125 mL 125 mL    [DISCONTINUED] dextrose 10% bolus 250 mL 250 mL    [DISCONTINUED] glucagon (human recombinant) injection 1 mg    [DISCONTINUED] glucose chewable tablet 16 g    [DISCONTINUED] glucose chewable tablet 24 g    [DISCONTINUED] insulin aspart U-100 pen 0-5 Units    [DISCONTINUED] ondansetron injection 4 mg     Current Outpatient Medications on File Prior to Encounter   Medication Sig    alogliptin-pioglitazone 12.5-15 mg Tab Take by mouth once daily.    aspirin (ECOTRIN) 81 MG EC tablet Take 1 tablet (81 mg total) by mouth once daily.    atorvastatin (LIPITOR) 40 MG tablet Take 1 tablet (40 mg total) by mouth once daily.    BASAGLAR KWIKPEN U-100 INSULIN glargine 100 units/mL SubQ pen SMARTSI Unit(s) SUB-Q Every Night    empagliflozin 10 mg Tab Take 10 mg by mouth once daily.    ergocalciferol (ERGOCALCIFEROL)  50,000 unit Cap Take 50,000 Units by mouth every 7 days.    insulin aspart protamine-insulin aspart (NOVOLOG 70/30) 100 unit/mL (70-30) InPn pen Inject into the skin 2 (two) times daily before meals.    insulin glargine (LANTUS) 100 unit/mL injection Inject into the skin every evening.    JANUMET -1,000 mg TM24 Take 1 tablet by mouth every morning.    losartan (COZAAR) 25 MG tablet Take 25 mg by mouth.    traMADoL (ULTRAM) 50 mg tablet Take 1 tablet (50 mg total) by mouth every 8 (eight) hours as needed for Pain.    VEMLIDY 25 mg Tab Take 1 tablet by mouth.    [DISCONTINUED] atorvastatin (LIPITOR) 20 MG tablet Take 20 mg by mouth once daily.     Family History    None       Tobacco Use    Smoking status: Former    Smokeless tobacco: Not on file   Substance and Sexual Activity    Alcohol use: No    Drug use: No    Sexual activity: Not on file     Review of Systems   Constitutional:  Positive for appetite change.   HENT: Negative.     Respiratory: Negative.     Cardiovascular:  Positive for chest pain.        Left-sided squeezing/sharp chest pain   Gastrointestinal:  Positive for abdominal pain.   Genitourinary: Negative.    Musculoskeletal:  Positive for back pain and myalgias.        Mostly on left thoracic spine   Neurological: Negative.    Psychiatric/Behavioral: Negative.       Objective:     Vital Signs (Most Recent):  Temp: 98.1 °F (36.7 °C) (01/13/24 2143)  Pulse: 95 (01/13/24 2249)  Resp: 20 (01/13/24 2249)  BP: (!) 151/67 (01/13/24 2249)  SpO2: 95 % (01/13/24 2300) Vital Signs (24h Range):  Temp:  [97.8 °F (36.6 °C)-98.3 °F (36.8 °C)] 98.1 °F (36.7 °C)  Pulse:  [57-95] 95  Resp:  [14-28] 20  SpO2:  [93 %-99 %] 95 %  BP: (114-189)/(56-75) 151/67     Weight: 70 kg (154 lb 5.2 oz)  Body mass index is 25.68 kg/m².     Physical Exam  Constitutional:       General: He is in acute distress.      Appearance: Normal appearance.      Comments: Patient visibly in pain, moving around in the bed    HENT:      Head:  Normocephalic and atraumatic.      Mouth/Throat:      Mouth: Mucous membranes are moist.   Eyes:      Extraocular Movements: Extraocular movements intact.   Cardiovascular:      Rate and Rhythm: Regular rhythm. Bradycardia present.      Pulses: Normal pulses.      Comments: EKG shows sinus bradycardia, no STEMI at 58 bpm  Pulmonary:      Effort: Pulmonary effort is normal.   Abdominal:      General: Abdomen is flat.      Tenderness: There is abdominal tenderness. There is no right CVA tenderness, left CVA tenderness or guarding.      Comments: Left upper quadrant.  Negative Velasquez's sign   Musculoskeletal:      Right lower leg: No edema.      Left lower leg: No edema.      Comments: Left-sided chest pain that radiates to the left paraspinal musculature of the thoracic spine. + TTP, no erythema, no ecchymosis   Skin:     General: Skin is warm.   Neurological:      General: No focal deficit present.      Mental Status: He is alert and oriented to person, place, and time. Mental status is at baseline.   Psychiatric:         Mood and Affect: Mood normal.         Behavior: Behavior normal.         Thought Content: Thought content normal.             Significant Labs: All pertinent labs within the past 24 hours have been reviewed.  Bilirubin:   Recent Labs   Lab 01/11/24  2300 01/13/24  0932 01/13/24  1809   BILITOT 0.4 0.8 0.6     BMP:   Recent Labs   Lab 01/13/24  0932 01/13/24  1809   * 242*    138   K 3.7 3.8    105   CO2 25 22*   BUN 16 17   CREATININE 0.7 0.8   CALCIUM 8.2* 8.7   MG 1.5*  --      CBC:   Recent Labs   Lab 01/13/24  0932 01/13/24  1809   WBC 7.60 11.18   HGB 13.4* 14.7   HCT 40.8 44.3    215     CMP:   Recent Labs   Lab 01/13/24  0932 01/13/24  1809    138   K 3.7 3.8    105   CO2 25 22*   * 242*   BUN 16 17   CREATININE 0.7 0.8   CALCIUM 8.2* 8.7   PROT 6.2 7.5   ALBUMIN 3.3* 3.8   BILITOT 0.8 0.6   ALKPHOS 37* 41*   AST 14 15   ALT 35 40   ANIONGAP 4* 11      Lipase:   Recent Labs   Lab 01/13/24  1809   LIPASE 33     Magnesium:   Recent Labs   Lab 01/13/24  0932   MG 1.5*     POCT Glucose:   Recent Labs   Lab 01/13/24  0728 01/13/24  1125 01/13/24  1805   POCTGLUCOSE 91 185* 250*     Troponin:   Recent Labs   Lab 01/12/24  1220 01/13/24  1809   TROPONINI <0.006 <0.006     Urine Studies:   Recent Labs   Lab 01/12/24  0119   COLORU Yellow   APPEARANCEUA Clear   PHUR 7.0   SPECGRAV >1.030*   PROTEINUA Negative   GLUCUA 4+*   KETONESU Negative   BILIRUBINUA Negative   OCCULTUA Negative   NITRITE Negative   UROBILINOGEN Negative   LEUKOCYTESUR Negative   BACTERIA None     Significant Imaging: I have reviewed all pertinent imaging results/findings within the past 24 hours.  Imaging Results              CTA Chest Non-Coronary (PE Studies) (Final result)  Result time 01/13/24 20:26:03      Final result by Giovanni Mckinney MD (01/13/24 20:26:03)                   Impression:      1. No evidence of PE.  No acute intrathoracic abnormalities identified.  2. Mild to moderate pulmonary emphysema.      Electronically signed by: Giovanni Mckinney MD  Date:    01/13/2024  Time:    20:26               Narrative:    EXAMINATION:  CTA CHEST NON CORONARY (PE STUDIES)    CLINICAL HISTORY:  Pulmonary embolism (PE) suspected, positive D-dimer;    TECHNIQUE:  Low dose axial images, sagittal and coronal reformations were obtained from the thoracic inlet to the lung bases following the IV administration of 75 mL of Omnipaque 350.  Contrast timing was optimized to evaluate the pulmonary arteries.  MIP images were performed.    COMPARISON:  None    FINDINGS:  Structures at the base of the neck are unremarkable.  Aorta is non-aneurysmal.  The heart is normal in size without pericardial effusion.  Mild coronary artery calcification is seen.  No intraluminal filling defects within the pulmonary arteries to suggest pulmonary thromboembolism.   There is no evidence of mediastinal, axillary, or hilar  lymph node enlargement.  The esophagus is unremarkable along its course.    The trachea and bronchi are patent.  The lungs are symmetrically expanded.  Mild to moderate emphysematous changes are seen within the upper lobes.  Lungs show no consolidation, pleural effusion, pulmonary hemorrhage, or infarction.    The visualized abdominal structures show no acute abnormalities.  Multiple remote right rib fracture deformities are seen.  Extrathoracic soft tissues are unremarkable.                                    Assessment/Plan:     * Left upper quadrant pain  -Patient returns back to the hospital after being discharged earlier today for LUQ and left-sided chest wall pain radiating to his back. Patient denies nausea, vomiting, fever, or shortness a breath  -Patient had extensive workup done prior to discharge by Surgery and Cardiology.  -CT abdomen showed possible cholecystitis and Surgery was consulted.  HIDA scan done and showed normal filling of gallbladder with no evidence acute cholecystitis.  -Cardiology consulted for left-sided atypical chest pain. Echo with WMA and stress test recommended outpatient.  -Patient was cleared from both Surgery and Cardiology and discharged with outpatient followups  -Presentation today:  Acute cholecystitis ruled out, EKG without any ST changes, likely combination of atypical chest pain versus musculoskeletal mainly thoracic spine issue    Plan:  NPO advance as tolerated  CT thoracic spine ordered along with x-rays of the spine  Pain control with p.r.n. analgesics and patch    Acute left-sided thoracic back pain  -see above  -CT thoracic spine and x-ray ordered  -Continue p.r.n. analgesics and lidocaine patches ordered  -Continue to monitor    Other chest pain  Patient with left-sided atypical chest pain. Seen by Dr. Pavon (Cardiology).  Echo showed no wall abnormalities with normal EF  -Troponin normal, EKG with sinus bradycardia, patient denies shortness of breath, diaphoresis,  "weakness, nausea/vomiting  -Stress test was recommended outpatient if patient was not willing to stay in-house until Tuesday  -Continue home aspirin/statin  -Cardiology consulted if stress test can be done sooner    Hypertension  Chronic, controlled. Latest blood pressure and vitals reviewed-     Temp:  [97.8 °F (36.6 °C)-98.3 °F (36.8 °C)]   Pulse:  [57-95]   Resp:  [14-28]   BP: (114-189)/(56-75)   SpO2:  [93 %-99 %] .   Home meds for hypertension were reviewed and noted below.   Hypertension Medications               losartan (COZAAR) 25 MG tablet Take 25 mg by mouth.          While in the hospital, will manage blood pressure as follows; Continue home antihypertensive regimen    Will utilize p.r.n. blood pressure medication only if patient's blood pressure greater than 180/110 and he develops symptoms such as worsening chest pain or shortness of breath.    Type 2 diabetes mellitus  Patient's FSGs are controlled on current medication regimen.  Last A1c reviewed- No results found for: "LABA1C", "HGBA1C"  Most recent fingerstick glucose reviewed-   Recent Labs   Lab 01/13/24  0728 01/13/24  1125 01/13/24  1805   POCTGLUCOSE 91 185* 250*     Current correctional scale  Medium  Maintain anti-hyperglycemic dose as follows-   Antihyperglycemics (From admission, onward)      Start     Stop Route Frequency Ordered    01/14/24 0042  insulin aspart U-100 pen 0-10 Units         -- SubQ Every 6 hours PRN 01/13/24 2342          SSI  Levemir for basal insulin  Accu-Cheks  Hold Oral hypoglycemics while patient is in the hospital.    Positive colorectal cancer screening using Cologuard test  Per chart review  Patient with positive Cologuard test  CEA ordered and pending  Recommendation was for GI follow-up and colonoscopy outpatient    Elevated d-dimer  -Patient presents with elevated 189/75, with normal respiration and pulse, slightly short of breath due to pain found to have D-dimer 0.50 (right at the cutoff).  -CTA chest " ordered and showed no evidence of PE or acute intrathoracic abnormalities however it did show mild to moderate pulmonary emphysema  -PE ruled out    Hyperlipidemia  Continue home statin therapy      VTE Risk Mitigation (From admission, onward)           Ordered     IP VTE LOW RISK PATIENT  Once         01/13/24 2251     Place sequential compression device  Until discontinued         01/13/24 2251                     On 01/14/2024, patient should be placed in inpatient hospital medicine services under my care in collaboration with Tello Ng MD. Morenita Muniz PA-C      AdmissionCare    Guideline: Abdominal Pain, Undiagnosed, Inpatient    Based on the indications selected for the patient, the bed status of Admit to Inpatient was determined to be NOT MET    The following indications were selected as present at the time of evaluation of the patient:       AdmissionCare documentation entered by: LOLA Timmons    [facility], 27th edition, Copyright © 2023 [facility] [employer], [facility] All Rights Reserved.  [id number]    Morenita Muniz PA-C  Department of Hospital Medicine  [facility] Surg

## 2024-01-14 NOTE — ASSESSMENT & PLAN NOTE
-see above  -CT thoracic spine and x-ray ordered  -Continue p.r.n. analgesics and lidocaine patches ordered  -Continue to monitor

## 2024-01-14 NOTE — PLAN OF CARE
Problem: Occupational Therapy  Goal: Occupational Therapy Goal  Description: Goals to be met by: 01/21/24     Patient will increase functional independence with ADLs by performing:    UE Dressing with [place].  LE Dressing with Set-up Assistance.  Grooming while seated at sink with Set-up Assistance.  Toileting from bedside commode with Set-up Assistance for hygiene and clothing management.   Sitting at edge of bed x 30-60  minutes with Supervision.  Toilet transfer to bedside commode with Supervision.  Upper extremity exercise program x10  reps per handout, with supervision.    Outcome: Ongoing, Progressing   Patient sitting in bed with 10/10 pain noted and did not want to walk to bathroom or sink. Occupational therapy will have ongoing assessment. Occupational therapy to see 2-3x/wk while in acute care.

## 2024-01-14 NOTE — ASSESSMENT & PLAN NOTE
Chronic, controlled. Latest blood pressure and vitals reviewed-     Temp:  [97.8 °F (36.6 °C)-98.3 °F (36.8 °C)]   Pulse:  [57-95]   Resp:  [14-28]   BP: (114-189)/(56-75)   SpO2:  [93 %-99 %] .   Home meds for hypertension were reviewed and noted below.   Hypertension Medications               losartan (COZAAR) 25 MG tablet Take 25 mg by mouth.          While in the hospital, will manage blood pressure as follows; Continue home antihypertensive regimen    Will utilize p.r.n. blood pressure medication only if patient's blood pressure greater than 180/110 and he develops symptoms such as worsening chest pain or shortness of breath.

## 2024-01-14 NOTE — HPI
Malou Suarez is a 58 y.o. with a pmh of DM and HTN presents to the hospital with complaints of left sided chest pain with radiation to the back on the left. Patient was recently admitted with left upper quadrant pain associated with n/v and seen by both Surgery and Cardiology. Surgery cleared patient as HIDA scan showed no acute cholecystitis.  Cardiology also cleared patient for atypical chest pain with follow-up stress test outpatient. He was discharged on 01/13/2024 from the hospital and returned the same day to the ED similar complaints. He states that he was pain-free upon discharge but pain returned at home which prompted his return.  He describes pain as squeezing and sharp on the left side of his chest which radiates to his left back mostly in the thoracic region. Patient states that he is unable to tolerate anything by mouth due to his pain. Patient took the pain medicine given to him on discharge without much relief. He denies any alleviating or exacerbating factors. Patient denies headache, fever, blurry vision, nausea, vomiting, diarrhea, melena, hematemesis, hematochezia, hematuria, or any other associated symptoms. Of note he states that he did not take his blood pressure and diabetes medications prior to arrival.     service was used due to language barrier.  ID# [id number]    In the ED: Patient is afebrile without leukocytosis, hypertensive on presentation 189/75, 98 % O2 sat on RA, CBC unremarkable, D-dimer 0.5, magnesium 1.5, ALP 41, troponin normal, POCT glucose 250, CTA chest (1) no evidence of PE no acute intrathoracic abnormality 2) mild to moderate pulmonary emphysema, EKG shows sinus bradycardia, no STEMI at 58 bpm. Patient given cyclobenzaprine 10 mg, ketorolac 15 mg IV, morphine 4 mg IV x2 in the ED.    Case discussed with ED provider.    Malou Suarez we will be placed under observation for further medical management of his intractable pain.

## 2024-01-14 NOTE — ASSESSMENT & PLAN NOTE
Patient with left-sided atypical chest pain. Seen by Dr. Pavon (Cardiology).  Echo showed no wall abnormalities with normal EF  -Troponin normal, EKG with sinus bradycardia, patient denies shortness of breath, diaphoresis, weakness, nausea/vomiting  -Stress test was recommended outpatient if patient was not willing to stay in-house until Tuesday  -Continue home aspirin/statin  -Cardiology consulted if stress test can be done sooner

## 2024-01-14 NOTE — NURSING
Patient complaining of severe pain to chest and back area. Notified Rashed Mahmargotd PAC at this time. EKG done at bedside. Pain medications ordered by provider.

## 2024-01-14 NOTE — NURSING
Patient arrived to floor via stretcher. Patient transferred to bed. Patient oriented to floor and room at this time. Basic setup placed at bedside. Vital signs are stable. Patient complaining of pain. Morphine 4mg administered at 2344 in ed. Explained to patient that medication was just received and that provider will be notified. Admit assessment completed.

## 2024-01-14 NOTE — ASSESSMENT & PLAN NOTE
-Patient presents with elevated 189/75, with normal respiration and pulse, slightly short of breath due to pain found to have D-dimer 0.50 (right at the cutoff).  -CTA chest ordered and showed no evidence of PE or acute intrathoracic abnormalities however it did show mild to moderate pulmonary emphysema  -PE ruled out

## 2024-01-14 NOTE — PT/OT/SLP EVAL
Occupational Therapy Evaluation     Name: Malou Suarez  MRN: [id number]  Admitting Diagnosis: Left upper quadrant pain  Recent Surgery: * No surgery found *      Recommendations:     Discharge Recommendations:  ongoing/pending, most likely Low intensive therapy   Level of Assistance Recommended: Intermittent assistance and Intermittent supervision  Discharge Equipment Recommendations: other (see comments) (ongoing assessment)  Barriers to discharge: None    Assessment:     Malou Suarez is a 58 y.o. male with a medical diagnosis of Left upper quadrant pain. He presents with performance deficits affecting function including weakness, impaired endurance, impaired self care skills, impaired functional mobility, decreased safety awareness, pain. Patient sitting in bed in chair position with 10/10 pain noted on B sides of obliques.     Rehab Prognosis:  Fair+ ; patient would benefit from acute OT services to address these deficits and reach maximum level of function.    Plan:     Patient to be seen 3 x/week to address the above listed problems via self-care/home management, therapeutic activities, therapeutic exercises  Plan of Care Expires: 01/21/24  Plan of Care Reviewed with: patient, spouse    Subjective     Chief Complaint: flank pain   Patient Comments/Goals: he did not name today   Pain/Comfort:  Pain Rating 1: 10/10  Location - Side 1: Bilateral  Location - Orientation 1:  (he pointed to both sides of abdominal area)  Location 1: flank    Patients cultural, spiritual, Congregational conflicts given the current situation: no    Social History:  Living Environment: Patient lives with their spouse in a single story home with number of outside stair(s): 0  Prior Level of Function: Prior to admission, patient with undetermined level of function  Roles and Routines: Patient was not driving and not working prior to admission.  Equipment Used at Home: none  DME owned (not currently used): none  Assistance Upon Discharge:  family    Objective:     Communicated with RNSydnee, prior to session. Patient found with bed in chair position with peripheral IV upon OT entry to room.    General Precautions: Standard, fall   Orthopedic Precautions: N/A   Braces: N/A    Respiratory Status: Room air    Occupational Performance    Gait belt applied - N/A    Bed Mobility:   Rolling/Turning to Left with modified independence  Rolling/Turning to Right with modified independence  Scooting anteriorly to EOB to have both feet planted on floor: modified independence    Functional Mobility/Transfers:  Patient did not want to get OOB due to pain, so NT     Activities of Daily Living:  Grooming: set up assistance seated in bed     Cognitive/Visual Perceptual:  Cognitive/Psychosocial Skills:    -     Oriented to: Person, Place, Time, Situation  -     Follows Commands/attention: Follows multistep  commands  -     Communication: clear/fluent and    -     Memory: No Deficits noted  -     Safety awareness/insight to disability: impaired  -     Mood/Affect/Coping skills/emotional control: having too much pain   Visual/Perceptual:    -     Intact    Physical Exam:  Balance:    -     Sitting: independence  -     Standing: NT   Postural examination/scapula alignment:    -       No postural abnormalities identified  Skin integrity: Visible skin intact  Edema:  None noted  Sensation:    -       Intact  Motor Planning: Intact  Dominant hand: Right  Upper Extremity Range of Motion:     -       Right Upper Extremity: WNL  -       Left Upper Extremity: WNL  Upper Extremity Strength:    -       Right Upper Extremity: WNL  -       Left Upper Extremity: WNL   Strength:    -       Right Upper Extremity: WNL  -       Left Upper Extremity: WNL  Fine Motor Coordination:    -       Intact  Gross motor coordination:   WFL    AMPAC 6 Click ADL:  AMPAC Total Score: 6    Treatment & Education:  Patient educated on role of OT, POC, and goals for therapy  Patient educated on  importance of OOB activities with staff member assistance and sitting OOB majority of the day  Occupational therapy educated patient re: possible need for equipment such as BSC for room due to pain, but patient declined.     Patient clear to stand pivot transfer with RN/PCT, assist x1  .    Patient left with bed in chair position with all lines intact, call button in reach, RN notified, and spouse present.    GOALS:   Multidisciplinary Problems       Occupational Therapy Goals          Problem: Occupational Therapy    Goal Priority Disciplines Outcome Interventions   Occupational Therapy Goal     OT, PT/OT Ongoing, Progressing    Description: Goals to be met by: 01/21/24     Patient will increase functional independence with ADLs by performing:    UE Dressing with [place].  LE Dressing with Set-up Assistance.  Grooming while seated at sink with Set-up Assistance.  Toileting from bedside commode with Set-up Assistance for hygiene and clothing management.   Sitting at edge of bed x 30-60  minutes with Supervision.  Toilet transfer to bedside commode with Supervision.  Upper extremity exercise program x10  reps per handout, with supervision.                         History:     Past Medical History:   Diagnosis Date    Diabetes mellitus     Pulmonary emphysema 01/14/2024       No past surgical history on file.    Time Tracking:     OT Date of Treatment: 01/14/24  OT Start Time: 1031  OT Stop Time: 1044  OT Total Time (min): 13 min    Billable Minutes: Evaluation 13     1/14/2024

## 2024-01-14 NOTE — ED PROVIDER NOTES
Encounter Date: 1/13/2024    SCRIBE #1 NOTE: I Pensacolamilagros Morataya, am scribing for, and in the presence of,  Radha Caro MD.       History     Chief Complaint   Patient presents with    Abdominal Pain     LUQ pain radiating to back worsening today. Reports was admitted at this hospital and discharged earlier for same. Denies N/V/D     This is a 59 y/o male with a PMHx of Diabetes Mellitus who presents to the Emergency Department complaining of constant LUQ abdominal pain with associated nausea and vomiting for 2 days. He states that his pain radiates to his left upper back. Patient was admitted for the same symptoms 2 days ago, and notes that his symptoms were not present at discharge 2 hours ago. Denies any nausea or vomiting episodes in the last 2 hours. He denies taking any medication for pain since discharge.       The history is provided by the patient and a relative. The history is limited by a language barrier. A  was used (Patient's son at bedside).     Review of patient's allergies indicates:  No Known Allergies  Past Medical History:   Diagnosis Date    Diabetes mellitus      No past surgical history on file.  No family history on file.  Social History     Tobacco Use    Smoking status: Former   Substance Use Topics    Alcohol use: No    Drug use: No     Review of Systems   Unable to perform ROS: Acuity of condition   Gastrointestinal:  Positive for abdominal pain.   Musculoskeletal:  Positive for back pain.       Physical Exam     Initial Vitals [01/13/24 1730]   BP Pulse Resp Temp SpO2   (!) 189/75 61 18 98.2 °F (36.8 °C) 98 %      MAP       --         Physical Exam    Nursing note and vitals reviewed.  Constitutional: He is not diaphoretic. He appears distressed.   HENT:   Head: Normocephalic and atraumatic.   Protecting airway   Eyes: Conjunctivae and EOM are normal. No scleral icterus.   Neck: Neck supple. No tracheal deviation present.   Normal range of motion.  Cardiovascular:   Normal rate, regular rhythm, normal heart sounds and intact distal pulses.           Pulmonary/Chest: Breath sounds normal. No stridor. No respiratory distress. He has no wheezes. He has no rales.   Speaking in full sentences   Abdominal: Abdomen is soft. Bowel sounds are normal. He exhibits no distension. There is no abdominal tenderness. There is no rebound and no guarding.   Musculoskeletal:         General: No edema.      Cervical back: Normal range of motion and neck supple.      Comments: There is left sided paraspinal tenderness in the thoracic region.     Neurological: He is alert. He has normal strength. No cranial nerve deficit or sensory deficit.   Skin: Skin is warm and dry.   No vesicular rash or lesions.   Psychiatric: He has a normal mood and affect.         ED Course   Procedures  Labs Reviewed   CBC W/ AUTO DIFFERENTIAL - Abnormal; Notable for the following components:       Result Value    Gran # (ANC) 8.0 (*)     All other components within normal limits   COMPREHENSIVE METABOLIC PANEL - Abnormal; Notable for the following components:    CO2 22 (*)     Glucose 242 (*)     Alkaline Phosphatase 41 (*)     All other components within normal limits   D DIMER, QUANTITATIVE - Abnormal; Notable for the following components:    D-Dimer 0.50 (*)     All other components within normal limits   POCT GLUCOSE - Abnormal; Notable for the following components:    POCT Glucose 250 (*)     All other components within normal limits   TROPONIN I   LIPASE   HEMOGLOBIN A1C   POCT GLUCOSE MONITORING CONTINUOUS     EKG Readings: (Independently Interpreted)   Initial Reading: No STEMI. Rhythm: Sinus Bradycardia. Heart Rate: 55. Ectopy: No Ectopy. Conduction: Normal. ST Segments: Normal ST Segments. T Waves: Normal. Axis: Normal.       Imaging Results              CTA Chest Non-Coronary (PE Studies) (Final result)  Result time 01/13/24 20:26:03      Final result by Giovanni Mckinney MD (01/13/24 20:26:03)                    Impression:      1. No evidence of PE.  No acute intrathoracic abnormalities identified.  2. Mild to moderate pulmonary emphysema.      Electronically signed by: Giovanni Mckinney MD  Date:    01/13/2024  Time:    20:26               Narrative:    EXAMINATION:  CTA CHEST NON CORONARY (PE STUDIES)    CLINICAL HISTORY:  Pulmonary embolism (PE) suspected, positive D-dimer;    TECHNIQUE:  Low dose axial images, sagittal and coronal reformations were obtained from the thoracic inlet to the lung bases following the IV administration of 75 mL of Omnipaque 350.  Contrast timing was optimized to evaluate the pulmonary arteries.  MIP images were performed.    COMPARISON:  None    FINDINGS:  Structures at the base of the neck are unremarkable.  Aorta is non-aneurysmal.  The heart is normal in size without pericardial effusion.  Mild coronary artery calcification is seen.  No intraluminal filling defects within the pulmonary arteries to suggest pulmonary thromboembolism.   There is no evidence of mediastinal, axillary, or hilar lymph node enlargement.  The esophagus is unremarkable along its course.    The trachea and bronchi are patent.  The lungs are symmetrically expanded.  Mild to moderate emphysematous changes are seen within the upper lobes.  Lungs show no consolidation, pleural effusion, pulmonary hemorrhage, or infarction.    The visualized abdominal structures show no acute abnormalities.  Multiple remote right rib fracture deformities are seen.  Extrathoracic soft tissues are unremarkable.                                       Medications   sodium chloride 0.9% flush 10 mL (has no administration in time range)   naloxone 0.4 mg/mL injection 0.02 mg (has no administration in time range)   glucose chewable tablet 16 g (has no administration in time range)   glucose chewable tablet 24 g (has no administration in time range)   glucagon (human recombinant) injection 1 mg (has no administration in time range)    acetaminophen tablet 650 mg (has no administration in time range)   ondansetron injection 4 mg (has no administration in time range)   prochlorperazine injection Soln 5 mg (has no administration in time range)   melatonin tablet 6 mg (has no administration in time range)   insulin aspart U-100 pen 0-5 Units (has no administration in time range)   dextrose 10% bolus 125 mL 125 mL (has no administration in time range)   dextrose 10% bolus 250 mL 250 mL (has no administration in time range)   morphine injection 4 mg (4 mg Intravenous Given 1/13/24 1809)   ketorolac injection 15 mg (15 mg Intravenous Given 1/13/24 1835)   cyclobenzaprine tablet 10 mg (10 mg Oral Given 1/13/24 1835)   iohexoL (OMNIPAQUE 350) injection 75 mL (75 mLs Intravenous Given 1/13/24 1956)   morphine injection 4 mg (4 mg Intravenous Given 1/13/24 2137)     Medical Decision Making  Patient is in acute painful distress at time of history and physical.  Collateral history obtained after analgesia and with use of .  Patient complains mostly left periscapular thoracic back pain radiating to the chest he reports that his symptoms worsened after eating.  Associated nausea.  He attempted treatment with the medications that he was discharge on for pain without improvement of symptoms.  Labs without leukocytosis or granulocytosis or anemia.  There is no renal failure or significant electrolyte abnormality.  Troponin is within normal ranges.  D-dimer is minimally elevated.  CTA of the chest does not demonstrate PE my review of CT images reveals thoracic degenerative disc disease which may be the cause of patient's symptoms.  Patient had persistent symptoms after IV morphine.  Patient given Toradol and Flexeril with some improvement in symptoms however pain returned patient given further morphine with some improvement in symptoms.  Unclear if patient's pain is related to degenerative disc disease.  Patient reports worsening pain after eating,  unclear if pain is related to biliary colic.  Due to persistence of pain patient is to be placed in observation p.r.n. analgesia, serial abdominal exams monitoring if is able to eat without recurrence of symptoms to evaluate symptoms are related to biliary colic, transition to a p.o. pain medication regiment that adequately controls his symptoms.    Amount and/or Complexity of Data Reviewed  External Data Reviewed: notes.     Details: 01/11/2023 - 01/13/2023 Admission to Gen Surg via ED for acute LUQ abdominal pain, back pain, nausea, and vomiting.  Symptomatic improvement in ED, CT Abdomen revealed  acute cholecystitis with 5 mm calculus at the gallbladder neck. Admitted to General Surgery evaluated atypical chest pains with WMA on ECHO. Discharged with Aspirin and Tramadol. Instructed for outpatient C-scope.  Labs: ordered. Decision-making details documented in ED Course.  Radiology: ordered. Decision-making details documented in ED Course.  ECG/medicine tests: ordered. Decision-making details documented in ED Course.    Risk  Prescription drug management.      Additional MDM:   Differential Diagnosis:   Differential Diagnosis includes, but is not limited to:  Musculoskeletal pain, Gastritis, Hepatobiliary Disease, and ACS.                     This chart was completed using dictation software, as a result there may be some transcription errors.                  Clinical Impression:  Final diagnoses:  [R07.9] Chest pain  [M51.34] Thoracic degenerative disc disease (Primary)  [R52] Intractable pain  [M54.6] Acute left-sided thoracic back pain          ED Disposition Condition    Observation                IRadha , personally performed the services described in this documentation. All medical record entries made by the scribe were at my direction and in my presence. I have reviewed the chart and agree that the record reflects my personal performance and is accurate and complete.       Radha Caro.,  MD  01/13/24 8019

## 2024-01-14 NOTE — NURSING
Patient's blood sugar checked at this time. Scheduled insulin given.  Brigette #[id number] utilized at this time to communicate with patient. Patient has no questions are concerns at this time. Patient still complaining of pain. Instructed patient that pain medication will be given as soon as it is available. Patient verbalized an understanding.

## 2024-01-14 NOTE — SUBJECTIVE & OBJECTIVE
Past Medical History:   Diagnosis Date    Diabetes mellitus        No past surgical history on file.    Review of patient's allergies indicates:  No Known Allergies    Current Facility-Administered Medications on File Prior to Encounter   Medication    [COMPLETED] magnesium sulfate 2g in water 50mL IVPB (premix)    [COMPLETED] morphine injection 4 mg    [COMPLETED] traMADoL tablet 100 mg    [DISCONTINUED] 0.9%  NaCl infusion    [DISCONTINUED] acetaminophen tablet 650 mg    [DISCONTINUED] dextrose 10% bolus 125 mL 125 mL    [DISCONTINUED] dextrose 10% bolus 250 mL 250 mL    [DISCONTINUED] glucagon (human recombinant) injection 1 mg    [DISCONTINUED] glucose chewable tablet 16 g    [DISCONTINUED] glucose chewable tablet 24 g    [DISCONTINUED] insulin aspart U-100 pen 0-5 Units    [DISCONTINUED] ondansetron injection 4 mg     Current Outpatient Medications on File Prior to Encounter   Medication Sig    alogliptin-pioglitazone 12.5-15 mg Tab Take by mouth once daily.    aspirin (ECOTRIN) 81 MG EC tablet Take 1 tablet (81 mg total) by mouth once daily.    atorvastatin (LIPITOR) 40 MG tablet Take 1 tablet (40 mg total) by mouth once daily.    BASAGLAR KWIKPEN U-100 INSULIN glargine 100 units/mL SubQ pen SMARTSI Unit(s) SUB-Q Every Night    empagliflozin 10 mg Tab Take 10 mg by mouth once daily.    ergocalciferol (ERGOCALCIFEROL) 50,000 unit Cap Take 50,000 Units by mouth every 7 days.    insulin aspart protamine-insulin aspart (NOVOLOG 70/30) 100 unit/mL (70-30) InPn pen Inject into the skin 2 (two) times daily before meals.    insulin glargine (LANTUS) 100 unit/mL injection Inject into the skin every evening.    JANUMET -1,000 mg TM24 Take 1 tablet by mouth every morning.    losartan (COZAAR) 25 MG tablet Take 25 mg by mouth.    traMADoL (ULTRAM) 50 mg tablet Take 1 tablet (50 mg total) by mouth every 8 (eight) hours as needed for Pain.    VEMLIDY 25 mg Tab Take 1 tablet by mouth.    [DISCONTINUED] atorvastatin  (LIPITOR) 20 MG tablet Take 20 mg by mouth once daily.     Family History    None       Tobacco Use    Smoking status: Former    Smokeless tobacco: Not on file   Substance and Sexual Activity    Alcohol use: No    Drug use: No    Sexual activity: Not on file     Review of Systems   Constitutional:  Positive for appetite change.   HENT: Negative.     Respiratory: Negative.     Cardiovascular:  Positive for chest pain.        Left-sided squeezing/sharp chest pain   Gastrointestinal:  Positive for abdominal pain.   Genitourinary: Negative.    Musculoskeletal:  Positive for back pain and myalgias.        Mostly on left thoracic spine   Neurological: Negative.    Psychiatric/Behavioral: Negative.       Objective:     Vital Signs (Most Recent):  Temp: 98.1 °F (36.7 °C) (01/13/24 2143)  Pulse: 95 (01/13/24 2249)  Resp: 20 (01/13/24 2249)  BP: (!) 151/67 (01/13/24 2249)  SpO2: 95 % (01/13/24 2300) Vital Signs (24h Range):  Temp:  [97.8 °F (36.6 °C)-98.3 °F (36.8 °C)] 98.1 °F (36.7 °C)  Pulse:  [57-95] 95  Resp:  [14-28] 20  SpO2:  [93 %-99 %] 95 %  BP: (114-189)/(56-75) 151/67     Weight: 70 kg (154 lb 5.2 oz)  Body mass index is 25.68 kg/m².     Physical Exam  Constitutional:       General: He is in acute distress.      Appearance: Normal appearance.      Comments: Patient visibly in pain, moving around in the bed    HENT:      Head: Normocephalic and atraumatic.      Mouth/Throat:      Mouth: Mucous membranes are moist.   Eyes:      Extraocular Movements: Extraocular movements intact.   Cardiovascular:      Rate and Rhythm: Regular rhythm. Bradycardia present.      Pulses: Normal pulses.      Comments: EKG shows sinus bradycardia, no STEMI at 58 bpm  Pulmonary:      Effort: Pulmonary effort is normal.   Abdominal:      General: Abdomen is flat.      Tenderness: There is abdominal tenderness. There is no right CVA tenderness, left CVA tenderness or guarding.      Comments: Left upper quadrant.  Negative Velasquez's sign    Musculoskeletal:      Right lower leg: No edema.      Left lower leg: No edema.      Comments: Left-sided chest pain that radiates to the left paraspinal musculature of the thoracic spine. + TTP, no erythema, no ecchymosis   Skin:     General: Skin is warm.   Neurological:      General: No focal deficit present.      Mental Status: He is alert and oriented to person, place, and time. Mental status is at baseline.   Psychiatric:         Mood and Affect: Mood normal.         Behavior: Behavior normal.         Thought Content: Thought content normal.             Significant Labs: All pertinent labs within the past 24 hours have been reviewed.  Bilirubin:   Recent Labs   Lab 01/11/24  2300 01/13/24  0932 01/13/24  1809   BILITOT 0.4 0.8 0.6     BMP:   Recent Labs   Lab 01/13/24  0932 01/13/24  1809   * 242*    138   K 3.7 3.8    105   CO2 25 22*   BUN 16 17   CREATININE 0.7 0.8   CALCIUM 8.2* 8.7   MG 1.5*  --      CBC:   Recent Labs   Lab 01/13/24  0932 01/13/24  1809   WBC 7.60 11.18   HGB 13.4* 14.7   HCT 40.8 44.3    215     CMP:   Recent Labs   Lab 01/13/24  0932 01/13/24  1809    138   K 3.7 3.8    105   CO2 25 22*   * 242*   BUN 16 17   CREATININE 0.7 0.8   CALCIUM 8.2* 8.7   PROT 6.2 7.5   ALBUMIN 3.3* 3.8   BILITOT 0.8 0.6   ALKPHOS 37* 41*   AST 14 15   ALT 35 40   ANIONGAP 4* 11     Lipase:   Recent Labs   Lab 01/13/24  1809   LIPASE 33     Magnesium:   Recent Labs   Lab 01/13/24  0932   MG 1.5*     POCT Glucose:   Recent Labs   Lab 01/13/24  0728 01/13/24  1125 01/13/24  1805   POCTGLUCOSE 91 185* 250*     Troponin:   Recent Labs   Lab 01/12/24  1220 01/13/24  1809   TROPONINI <0.006 <0.006     Urine Studies:   Recent Labs   Lab 01/12/24  0119   COLORU Yellow   APPEARANCEUA Clear   PHUR 7.0   SPECGRAV >1.030*   PROTEINUA Negative   GLUCUA 4+*   KETONESU Negative   BILIRUBINUA Negative   OCCULTUA Negative   NITRITE Negative   UROBILINOGEN Negative   LEUKOCYTESUR  Negative   BACTERIA None     Significant Imaging: I have reviewed all pertinent imaging results/findings within the past 24 hours.  Imaging Results              CTA Chest Non-Coronary (PE Studies) (Final result)  Result time 01/13/24 20:26:03      Final result by Giovanni Mckinney MD (01/13/24 20:26:03)                   Impression:      1. No evidence of PE.  No acute intrathoracic abnormalities identified.  2. Mild to moderate pulmonary emphysema.      Electronically signed by: Giovanni Mckinney MD  Date:    01/13/2024  Time:    20:26               Narrative:    EXAMINATION:  CTA CHEST NON CORONARY (PE STUDIES)    CLINICAL HISTORY:  Pulmonary embolism (PE) suspected, positive D-dimer;    TECHNIQUE:  Low dose axial images, sagittal and coronal reformations were obtained from the thoracic inlet to the lung bases following the IV administration of 75 mL of Omnipaque 350.  Contrast timing was optimized to evaluate the pulmonary arteries.  MIP images were performed.    COMPARISON:  None    FINDINGS:  Structures at the base of the neck are unremarkable.  Aorta is non-aneurysmal.  The heart is normal in size without pericardial effusion.  Mild coronary artery calcification is seen.  No intraluminal filling defects within the pulmonary arteries to suggest pulmonary thromboembolism.   There is no evidence of mediastinal, axillary, or hilar lymph node enlargement.  The esophagus is unremarkable along its course.    The trachea and bronchi are patent.  The lungs are symmetrically expanded.  Mild to moderate emphysematous changes are seen within the upper lobes.  Lungs show no consolidation, pleural effusion, pulmonary hemorrhage, or infarction.    The visualized abdominal structures show no acute abnormalities.  Multiple remote right rib fracture deformities are seen.  Extrathoracic soft tissues are unremarkable.

## 2024-01-14 NOTE — ASSESSMENT & PLAN NOTE
-Patient returns back to the hospital after being discharged earlier today for LUQ and left-sided chest wall pain radiating to his back. Patient denies nausea, vomiting, fever, or shortness a breath  -Patient had extensive workup done prior to discharge by Surgery and Cardiology.  -CT abdomen showed possible cholecystitis and Surgery was consulted.  HIDA scan done and showed normal filling of gallbladder with no evidence acute cholecystitis.  -Cardiology consulted for left-sided atypical chest pain. Echo with WMA and stress test recommended outpatient.  -Patient was cleared from both Surgery and Cardiology and discharged with outpatient followups  -Presentation today:  Acute cholecystitis ruled out, EKG without any ST changes, likely combination of atypical chest pain versus musculoskeletal mainly thoracic spine issue    Plan:  NPO advance as tolerated  CT thoracic spine ordered along with x-rays of the spine  Pain control with p.r.n. analgesics

## 2024-01-14 NOTE — ASSESSMENT & PLAN NOTE
Patient with left-sided atypical chest pain. Seen by Dr. Pavon (Cardiology).  Echo showed no wall abnormalities with normal EF  -Troponin normal, EKG with sinus bradycardia, patient denies shortness of breath, diaphoresis, weakness, nausea/vomiting  -Stress test was recommended outpatient if patient was not willing to stay in-house until Tuesday  -Cardiology consulted if stress test can be done sooner

## 2024-01-14 NOTE — ADMISSIONCARE
AdmissionCare    Guideline: Abdominal Pain, Undiagnosed, Inpatient    Based on the indications selected for the patient, the bed status of Admit to Inpatient was determined to be NOT MET    The following indications were selected as present at the time of evaluation of the patient:       AdmissionCare documentation entered by: LOLA Timmons    [facility], 27th edition, Copyright © 2023 [facility], [facility] All Rights Reserved.  [id number]

## 2024-01-14 NOTE — ASSESSMENT & PLAN NOTE
"Patient's FSGs are controlled on current medication regimen.  Last A1c reviewed- No results found for: "LABA1C", "HGBA1C"  Most recent fingerstick glucose reviewed-   Recent Labs   Lab 01/13/24  0728 01/13/24  1125 01/13/24  1805   POCTGLUCOSE 91 185* 250*     Current correctional scale  Medium  Maintain anti-hyperglycemic dose as follows-   Antihyperglycemics (From admission, onward)      Start     Stop Route Frequency Ordered    01/14/24 0042  insulin aspart U-100 pen 0-10 Units         -- SubQ Every 6 hours PRN 01/13/24 2342          SSI  Levemir for basal insulin  Accu-Cheks  Hold Oral hypoglycemics while patient is in the hospital.  "

## 2024-01-14 NOTE — ASSESSMENT & PLAN NOTE
Per chart review  Patient with positive Cologuard test  CEA ordered and pending  Recommendation was for GI follow-up and colonoscopy outpatient

## 2024-01-15 ENCOUNTER — ANESTHESIA (OUTPATIENT)
Dept: SURGERY | Facility: HOSPITAL | Age: 59
DRG: 418 | End: 2024-01-15
Payer: COMMERCIAL

## 2024-01-15 ENCOUNTER — ANESTHESIA EVENT (OUTPATIENT)
Dept: SURGERY | Facility: HOSPITAL | Age: 59
DRG: 418 | End: 2024-01-15
Payer: COMMERCIAL

## 2024-01-15 LAB
ALBUMIN SERPL BCP-MCNC: 3.2 G/DL (ref 3.5–5.2)
ALP SERPL-CCNC: 36 U/L (ref 55–135)
ALT SERPL W/O P-5'-P-CCNC: 35 U/L (ref 10–44)
ANION GAP SERPL CALC-SCNC: 11 MMOL/L (ref 8–16)
AST SERPL-CCNC: 23 U/L (ref 10–40)
BASOPHILS # BLD AUTO: 0.03 K/UL (ref 0–0.2)
BASOPHILS NFR BLD: 0 % (ref 0–1.9)
BASOPHILS NFR BLD: 0.3 % (ref 0–1.9)
BILIRUB SERPL-MCNC: 1.9 MG/DL (ref 0.1–1)
BUN SERPL-MCNC: 27 MG/DL (ref 6–20)
CALCIUM SERPL-MCNC: 8.7 MG/DL (ref 8.7–10.5)
CEA SERPL-MCNC: 2.7 NG/ML (ref 0–5)
CHLORIDE SERPL-SCNC: 99 MMOL/L (ref 95–110)
CO2 SERPL-SCNC: 25 MMOL/L (ref 23–29)
CREAT SERPL-MCNC: 0.8 MG/DL (ref 0.5–1.4)
DIFFERENTIAL METHOD BLD: ABNORMAL
DIFFERENTIAL METHOD BLD: ABNORMAL
EOSINOPHIL # BLD AUTO: 0 K/UL (ref 0–0.5)
EOSINOPHIL NFR BLD: 0 % (ref 0–8)
EOSINOPHIL NFR BLD: 0 % (ref 0–8)
ERYTHROCYTE [DISTWIDTH] IN BLOOD BY AUTOMATED COUNT: 13.2 % (ref 11.5–14.5)
ERYTHROCYTE [DISTWIDTH] IN BLOOD BY AUTOMATED COUNT: 13.3 % (ref 11.5–14.5)
EST. GFR  (NO RACE VARIABLE): >60 ML/MIN/1.73 M^2
ESTIMATED AVG GLUCOSE: 212 MG/DL (ref 68–131)
GLUCOSE SERPL-MCNC: 160 MG/DL (ref 70–110)
HBA1C MFR BLD: 9 % (ref 4–5.6)
HCT VFR BLD AUTO: 39.4 % (ref 40–54)
HCT VFR BLD AUTO: 47.2 % (ref 40–54)
HGB BLD-MCNC: 13.1 G/DL (ref 14–18)
HGB BLD-MCNC: 15.1 G/DL (ref 14–18)
IMM GRANULOCYTES # BLD AUTO: 0.02 K/UL (ref 0–0.04)
IMM GRANULOCYTES # BLD AUTO: ABNORMAL K/UL (ref 0–0.04)
IMM GRANULOCYTES NFR BLD AUTO: 0.2 % (ref 0–0.5)
IMM GRANULOCYTES NFR BLD AUTO: ABNORMAL % (ref 0–0.5)
LYMPHOCYTES # BLD AUTO: 1.6 K/UL (ref 1–4.8)
LYMPHOCYTES NFR BLD: 11 % (ref 18–48)
LYMPHOCYTES NFR BLD: 15.5 % (ref 18–48)
MAGNESIUM SERPL-MCNC: 2 MG/DL (ref 1.6–2.6)
MCH RBC QN AUTO: 28.5 PG (ref 27–31)
MCH RBC QN AUTO: 29.2 PG (ref 27–31)
MCHC RBC AUTO-ENTMCNC: 32 G/DL (ref 32–36)
MCHC RBC AUTO-ENTMCNC: 33.2 G/DL (ref 32–36)
MCV RBC AUTO: 88 FL (ref 82–98)
MCV RBC AUTO: 89 FL (ref 82–98)
METAMYELOCYTES NFR BLD MANUAL: 5 %
MONOCYTES # BLD AUTO: 0.4 K/UL (ref 0.3–1)
MONOCYTES NFR BLD: 0 % (ref 4–15)
MONOCYTES NFR BLD: 4.1 % (ref 4–15)
MYELOCYTES NFR BLD MANUAL: 1 %
NEUTROPHILS # BLD AUTO: 8.5 K/UL (ref 1.8–7.7)
NEUTROPHILS NFR BLD: 58 % (ref 38–73)
NEUTROPHILS NFR BLD: 79.9 % (ref 38–73)
NEUTS BAND NFR BLD MANUAL: 25 %
NRBC BLD-RTO: 0 /100 WBC
NRBC BLD-RTO: 0 /100 WBC
PHOSPHATE SERPL-MCNC: 2.8 MG/DL (ref 2.7–4.5)
PLATELET # BLD AUTO: 160 K/UL (ref 150–450)
PLATELET # BLD AUTO: 201 K/UL (ref 150–450)
PLATELET BLD QL SMEAR: ABNORMAL
PMV BLD AUTO: 10.3 FL (ref 9.2–12.9)
PMV BLD AUTO: 10.3 FL (ref 9.2–12.9)
POCT GLUCOSE: 170 MG/DL (ref 70–110)
POCT GLUCOSE: 199 MG/DL (ref 70–110)
POCT GLUCOSE: 233 MG/DL (ref 70–110)
POCT GLUCOSE: 244 MG/DL (ref 70–110)
POTASSIUM SERPL-SCNC: 4.1 MMOL/L (ref 3.5–5.1)
PROT SERPL-MCNC: 7.3 G/DL (ref 6–8.4)
RBC # BLD AUTO: 4.49 M/UL (ref 4.6–6.2)
RBC # BLD AUTO: 5.29 M/UL (ref 4.6–6.2)
SODIUM SERPL-SCNC: 135 MMOL/L (ref 136–145)
WBC # BLD AUTO: 10.59 K/UL (ref 3.9–12.7)
WBC # BLD AUTO: 7.73 K/UL (ref 3.9–12.7)

## 2024-01-15 PROCEDURE — 96365 THER/PROPH/DIAG IV INF INIT: CPT

## 2024-01-15 PROCEDURE — 25000003 PHARM REV CODE 250

## 2024-01-15 PROCEDURE — 0FT44ZZ RESECTION OF GALLBLADDER, PERCUTANEOUS ENDOSCOPIC APPROACH: ICD-10-PCS | Performed by: SURGERY

## 2024-01-15 PROCEDURE — 83735 ASSAY OF MAGNESIUM: CPT

## 2024-01-15 PROCEDURE — 80053 COMPREHEN METABOLIC PANEL: CPT

## 2024-01-15 PROCEDURE — 25500020 PHARM REV CODE 255: Performed by: HOSPITALIST

## 2024-01-15 PROCEDURE — 63600175 PHARM REV CODE 636 W HCPCS: Mod: JG | Performed by: SURGERY

## 2024-01-15 PROCEDURE — 88304 TISSUE EXAM BY PATHOLOGIST: CPT | Performed by: PATHOLOGY

## 2024-01-15 PROCEDURE — 96361 HYDRATE IV INFUSION ADD-ON: CPT

## 2024-01-15 PROCEDURE — 25000003 PHARM REV CODE 250: Performed by: HOSPITALIST

## 2024-01-15 PROCEDURE — 27201423 OPTIME MED/SURG SUP & DEVICES STERILE SUPPLY: Performed by: SURGERY

## 2024-01-15 PROCEDURE — 25000003 PHARM REV CODE 250: Performed by: NURSE ANESTHETIST, CERTIFIED REGISTERED

## 2024-01-15 PROCEDURE — 63600175 PHARM REV CODE 636 W HCPCS: Performed by: STUDENT IN AN ORGANIZED HEALTH CARE EDUCATION/TRAINING PROGRAM

## 2024-01-15 PROCEDURE — G0378 HOSPITAL OBSERVATION PER HR: HCPCS

## 2024-01-15 PROCEDURE — 25000003 PHARM REV CODE 250: Performed by: STUDENT IN AN ORGANIZED HEALTH CARE EDUCATION/TRAINING PROGRAM

## 2024-01-15 PROCEDURE — 96376 TX/PRO/DX INJ SAME DRUG ADON: CPT

## 2024-01-15 PROCEDURE — 37000008 HC ANESTHESIA 1ST 15 MINUTES: Performed by: SURGERY

## 2024-01-15 PROCEDURE — 96360 HYDRATION IV INFUSION INIT: CPT | Mod: 59

## 2024-01-15 PROCEDURE — 36000710: Performed by: SURGERY

## 2024-01-15 PROCEDURE — 36415 COLL VENOUS BLD VENIPUNCTURE: CPT

## 2024-01-15 PROCEDURE — 47562 LAPAROSCOPIC CHOLECYSTECTOMY: CPT | Mod: ,,, | Performed by: SURGERY

## 2024-01-15 PROCEDURE — 85027 COMPLETE CBC AUTOMATED: CPT

## 2024-01-15 PROCEDURE — 36415 COLL VENOUS BLD VENIPUNCTURE: CPT | Mod: XB

## 2024-01-15 PROCEDURE — 85025 COMPLETE CBC W/AUTO DIFF WBC: CPT

## 2024-01-15 PROCEDURE — 84100 ASSAY OF PHOSPHORUS: CPT

## 2024-01-15 PROCEDURE — 88304 TISSUE EXAM BY PATHOLOGIST: CPT | Mod: 26,,, | Performed by: PATHOLOGY

## 2024-01-15 PROCEDURE — 63600175 PHARM REV CODE 636 W HCPCS: Performed by: HOSPITALIST

## 2024-01-15 PROCEDURE — 99223 1ST HOSP IP/OBS HIGH 75: CPT | Mod: 57,,, | Performed by: SURGERY

## 2024-01-15 PROCEDURE — 36000711: Performed by: SURGERY

## 2024-01-15 PROCEDURE — 85007 BL SMEAR W/DIFF WBC COUNT: CPT

## 2024-01-15 PROCEDURE — 96375 TX/PRO/DX INJ NEW DRUG ADDON: CPT

## 2024-01-15 PROCEDURE — 71000033 HC RECOVERY, INTIAL HOUR: Performed by: SURGERY

## 2024-01-15 PROCEDURE — D9220A PRA ANESTHESIA: Mod: ANES,,, | Performed by: ANESTHESIOLOGY

## 2024-01-15 PROCEDURE — 63600175 PHARM REV CODE 636 W HCPCS: Performed by: NURSE ANESTHETIST, CERTIFIED REGISTERED

## 2024-01-15 PROCEDURE — 97162 PT EVAL MOD COMPLEX 30 MIN: CPT | Performed by: PHYSICAL THERAPIST

## 2024-01-15 PROCEDURE — 37000009 HC ANESTHESIA EA ADD 15 MINS: Performed by: SURGERY

## 2024-01-15 PROCEDURE — D9220A PRA ANESTHESIA: Mod: CRNA,,, | Performed by: NURSE ANESTHETIST, CERTIFIED REGISTERED

## 2024-01-15 PROCEDURE — 96366 THER/PROPH/DIAG IV INF ADDON: CPT

## 2024-01-15 PROCEDURE — 63600175 PHARM REV CODE 636 W HCPCS

## 2024-01-15 RX ORDER — SUCCINYLCHOLINE CHLORIDE 20 MG/ML
INJECTION INTRAMUSCULAR; INTRAVENOUS
Status: DISCONTINUED | OUTPATIENT
Start: 2024-01-15 | End: 2024-01-15

## 2024-01-15 RX ORDER — PHENYLEPHRINE HYDROCHLORIDE 10 MG/ML
INJECTION INTRAVENOUS
Status: DISCONTINUED | OUTPATIENT
Start: 2024-01-15 | End: 2024-01-15

## 2024-01-15 RX ORDER — PROCHLORPERAZINE EDISYLATE 5 MG/ML
5 INJECTION INTRAMUSCULAR; INTRAVENOUS EVERY 30 MIN PRN
Status: DISCONTINUED | OUTPATIENT
Start: 2024-01-15 | End: 2024-01-18 | Stop reason: HOSPADM

## 2024-01-15 RX ORDER — SODIUM CHLORIDE 9 MG/ML
INJECTION, SOLUTION INTRAVENOUS CONTINUOUS
Status: DISCONTINUED | OUTPATIENT
Start: 2024-01-15 | End: 2024-01-15

## 2024-01-15 RX ORDER — PROPOFOL 10 MG/ML
VIAL (ML) INTRAVENOUS
Status: DISCONTINUED | OUTPATIENT
Start: 2024-01-15 | End: 2024-01-15

## 2024-01-15 RX ORDER — SODIUM CHLORIDE 0.9 % (FLUSH) 0.9 %
10 SYRINGE (ML) INJECTION
Status: DISCONTINUED | OUTPATIENT
Start: 2024-01-15 | End: 2024-01-18 | Stop reason: HOSPADM

## 2024-01-15 RX ORDER — ROCURONIUM BROMIDE 10 MG/ML
INJECTION, SOLUTION INTRAVENOUS
Status: DISCONTINUED | OUTPATIENT
Start: 2024-01-15 | End: 2024-01-15

## 2024-01-15 RX ORDER — INDOCYANINE GREEN AND WATER 25 MG
2.5 KIT INJECTION ONCE
Status: COMPLETED | OUTPATIENT
Start: 2024-01-15 | End: 2024-01-15

## 2024-01-15 RX ORDER — OXYCODONE HYDROCHLORIDE 5 MG/1
5 TABLET ORAL EVERY 4 HOURS PRN
Status: DISCONTINUED | OUTPATIENT
Start: 2024-01-15 | End: 2024-01-18 | Stop reason: HOSPADM

## 2024-01-15 RX ORDER — FENTANYL CITRATE 50 UG/ML
INJECTION, SOLUTION INTRAMUSCULAR; INTRAVENOUS
Status: DISCONTINUED | OUTPATIENT
Start: 2024-01-15 | End: 2024-01-15

## 2024-01-15 RX ORDER — HYDROMORPHONE HYDROCHLORIDE 1 MG/ML
2 INJECTION, SOLUTION INTRAMUSCULAR; INTRAVENOUS; SUBCUTANEOUS
Status: DISCONTINUED | OUTPATIENT
Start: 2024-01-15 | End: 2024-01-15

## 2024-01-15 RX ORDER — OXYCODONE AND ACETAMINOPHEN 5; 325 MG/1; MG/1
1 TABLET ORAL EVERY 4 HOURS PRN
Status: DISCONTINUED | OUTPATIENT
Start: 2024-01-15 | End: 2024-01-15

## 2024-01-15 RX ORDER — HYDROMORPHONE HYDROCHLORIDE 2 MG/ML
0.2 INJECTION, SOLUTION INTRAMUSCULAR; INTRAVENOUS; SUBCUTANEOUS EVERY 5 MIN PRN
Status: DISCONTINUED | OUTPATIENT
Start: 2024-01-15 | End: 2024-01-18 | Stop reason: HOSPADM

## 2024-01-15 RX ORDER — GABAPENTIN 300 MG/1
300 CAPSULE ORAL 3 TIMES DAILY
Status: DISCONTINUED | OUTPATIENT
Start: 2024-01-15 | End: 2024-01-18 | Stop reason: HOSPADM

## 2024-01-15 RX ORDER — ONDANSETRON HYDROCHLORIDE 2 MG/ML
4 INJECTION, SOLUTION INTRAVENOUS DAILY PRN
Status: DISCONTINUED | OUTPATIENT
Start: 2024-01-15 | End: 2024-01-18 | Stop reason: HOSPADM

## 2024-01-15 RX ORDER — SODIUM CHLORIDE, SODIUM LACTATE, POTASSIUM CHLORIDE, CALCIUM CHLORIDE 600; 310; 30; 20 MG/100ML; MG/100ML; MG/100ML; MG/100ML
INJECTION, SOLUTION INTRAVENOUS CONTINUOUS
Status: DISCONTINUED | OUTPATIENT
Start: 2024-01-15 | End: 2024-01-17

## 2024-01-15 RX ORDER — ACETAMINOPHEN 10 MG/ML
1000 INJECTION, SOLUTION INTRAVENOUS EVERY 8 HOURS
Status: COMPLETED | OUTPATIENT
Start: 2024-01-15 | End: 2024-01-16

## 2024-01-15 RX ORDER — ONDANSETRON 2 MG/ML
INJECTION INTRAMUSCULAR; INTRAVENOUS
Status: DISCONTINUED | OUTPATIENT
Start: 2024-01-15 | End: 2024-01-15

## 2024-01-15 RX ORDER — LIDOCAINE HYDROCHLORIDE 20 MG/ML
INJECTION INTRAVENOUS
Status: DISCONTINUED | OUTPATIENT
Start: 2024-01-15 | End: 2024-01-15

## 2024-01-15 RX ORDER — ACETAMINOPHEN 500 MG
1000 TABLET ORAL EVERY 8 HOURS
Status: DISCONTINUED | OUTPATIENT
Start: 2024-01-16 | End: 2024-01-18 | Stop reason: HOSPADM

## 2024-01-15 RX ORDER — HYDROMORPHONE HYDROCHLORIDE 1 MG/ML
1 INJECTION, SOLUTION INTRAMUSCULAR; INTRAVENOUS; SUBCUTANEOUS
Status: DISCONTINUED | OUTPATIENT
Start: 2024-01-15 | End: 2024-01-18 | Stop reason: HOSPADM

## 2024-01-15 RX ORDER — POLYETHYLENE GLYCOL 3350 17 G/17G
17 POWDER, FOR SOLUTION ORAL 2 TIMES DAILY
Status: DISCONTINUED | OUTPATIENT
Start: 2024-01-15 | End: 2024-01-18 | Stop reason: HOSPADM

## 2024-01-15 RX ORDER — ACETAMINOPHEN 325 MG/1
650 TABLET ORAL EVERY 4 HOURS PRN
Status: DISCONTINUED | OUTPATIENT
Start: 2024-01-15 | End: 2024-01-15

## 2024-01-15 RX ORDER — OXYCODONE HYDROCHLORIDE 5 MG/1
10 TABLET ORAL EVERY 4 HOURS PRN
Status: DISCONTINUED | OUTPATIENT
Start: 2024-01-15 | End: 2024-01-18 | Stop reason: HOSPADM

## 2024-01-15 RX ORDER — MIDAZOLAM HYDROCHLORIDE 1 MG/ML
INJECTION, SOLUTION INTRAMUSCULAR; INTRAVENOUS
Status: DISCONTINUED | OUTPATIENT
Start: 2024-01-15 | End: 2024-01-15

## 2024-01-15 RX ORDER — DEXAMETHASONE SODIUM PHOSPHATE 4 MG/ML
INJECTION, SOLUTION INTRA-ARTICULAR; INTRALESIONAL; INTRAMUSCULAR; INTRAVENOUS; SOFT TISSUE
Status: DISCONTINUED | OUTPATIENT
Start: 2024-01-15 | End: 2024-01-15

## 2024-01-15 RX ORDER — DEXTROSE MONOHYDRATE AND SODIUM CHLORIDE 5; .9 G/100ML; G/100ML
INJECTION, SOLUTION INTRAVENOUS CONTINUOUS
Status: DISCONTINUED | OUTPATIENT
Start: 2024-01-15 | End: 2024-01-15

## 2024-01-15 RX ORDER — BUPIVACAINE HYDROCHLORIDE 2.5 MG/ML
INJECTION, SOLUTION INFILTRATION; PERINEURAL
Status: DISCONTINUED | OUTPATIENT
Start: 2024-01-15 | End: 2024-01-15 | Stop reason: HOSPADM

## 2024-01-15 RX ADMIN — ASPIRIN 81 MG: 81 TABLET, COATED ORAL at 08:01

## 2024-01-15 RX ADMIN — INDOCYANINE GREEN AND WATER 2.5 MG: KIT at 02:01

## 2024-01-15 RX ADMIN — SENNOSIDES AND DOCUSATE SODIUM 1 TABLET: 50; 8.6 TABLET ORAL at 08:01

## 2024-01-15 RX ADMIN — INSULIN DETEMIR 10 UNITS: 100 INJECTION, SOLUTION SUBCUTANEOUS at 09:01

## 2024-01-15 RX ADMIN — SUCCINYLCHOLINE CHLORIDE 100 MG: 20 INJECTION, SOLUTION INTRAMUSCULAR; INTRAVENOUS at 04:01

## 2024-01-15 RX ADMIN — PROPOFOL 140 MG: 10 INJECTION, EMULSION INTRAVENOUS at 04:01

## 2024-01-15 RX ADMIN — GABAPENTIN 300 MG: 300 CAPSULE ORAL at 09:01

## 2024-01-15 RX ADMIN — SODIUM CHLORIDE, POTASSIUM CHLORIDE, SODIUM LACTATE AND CALCIUM CHLORIDE: 600; 310; 30; 20 INJECTION, SOLUTION INTRAVENOUS at 11:01

## 2024-01-15 RX ADMIN — SODIUM CHLORIDE, POTASSIUM CHLORIDE, SODIUM LACTATE AND CALCIUM CHLORIDE: 600; 310; 30; 20 INJECTION, SOLUTION INTRAVENOUS at 06:01

## 2024-01-15 RX ADMIN — ONDANSETRON 4 MG: 2 INJECTION, SOLUTION INTRAMUSCULAR; INTRAVENOUS at 04:01

## 2024-01-15 RX ADMIN — ACETAMINOPHEN 650 MG: 325 TABLET ORAL at 12:01

## 2024-01-15 RX ADMIN — INSULIN ASPART 2 UNITS: 100 INJECTION, SOLUTION INTRAVENOUS; SUBCUTANEOUS at 11:01

## 2024-01-15 RX ADMIN — PHENYLEPHRINE HYDROCHLORIDE 100 MCG: 10 INJECTION INTRAVENOUS at 04:01

## 2024-01-15 RX ADMIN — FENTANYL CITRATE 50 MCG: 50 INJECTION, SOLUTION INTRAMUSCULAR; INTRAVENOUS at 04:01

## 2024-01-15 RX ADMIN — ROCURONIUM BROMIDE 30 MG: 10 INJECTION, SOLUTION INTRAVENOUS at 04:01

## 2024-01-15 RX ADMIN — MIDAZOLAM HYDROCHLORIDE 1 MG: 1 INJECTION, SOLUTION INTRAMUSCULAR; INTRAVENOUS at 05:01

## 2024-01-15 RX ADMIN — LIDOCAINE HYDROCHLORIDE 100 MG: 20 INJECTION, SOLUTION INTRAVENOUS at 04:01

## 2024-01-15 RX ADMIN — OXYCODONE AND ACETAMINOPHEN 1 TABLET: 5; 325 TABLET ORAL at 09:01

## 2024-01-15 RX ADMIN — ATORVASTATIN CALCIUM 40 MG: 40 TABLET, FILM COATED ORAL at 08:01

## 2024-01-15 RX ADMIN — LOSARTAN POTASSIUM 25 MG: 25 TABLET, FILM COATED ORAL at 08:01

## 2024-01-15 RX ADMIN — SODIUM CHLORIDE, SODIUM LACTATE, POTASSIUM CHLORIDE, AND CALCIUM CHLORIDE: .6; .31; .03; .02 INJECTION, SOLUTION INTRAVENOUS at 04:01

## 2024-01-15 RX ADMIN — SUGAMMADEX 200 MG: 100 INJECTION, SOLUTION INTRAVENOUS at 05:01

## 2024-01-15 RX ADMIN — DEXAMETHASONE SODIUM PHOSPHATE 4 MG: 4 INJECTION, SOLUTION INTRAMUSCULAR; INTRAVENOUS at 04:01

## 2024-01-15 RX ADMIN — SODIUM CHLORIDE, SODIUM LACTATE, POTASSIUM CHLORIDE, AND CALCIUM CHLORIDE: .6; .31; .03; .02 INJECTION, SOLUTION INTRAVENOUS at 03:01

## 2024-01-15 RX ADMIN — IOHEXOL 75 ML: 350 INJECTION, SOLUTION INTRAVENOUS at 01:01

## 2024-01-15 RX ADMIN — OXYCODONE HYDROCHLORIDE 10 MG: 5 TABLET ORAL at 09:01

## 2024-01-15 RX ADMIN — HYDROMORPHONE HYDROCHLORIDE 2 MG: 1 INJECTION, SOLUTION INTRAMUSCULAR; INTRAVENOUS; SUBCUTANEOUS at 10:01

## 2024-01-15 RX ADMIN — PHENYLEPHRINE HYDROCHLORIDE 100 MCG: 10 INJECTION INTRAVENOUS at 05:01

## 2024-01-15 RX ADMIN — DEXTROSE AND SODIUM CHLORIDE: 5; 900 INJECTION, SOLUTION INTRAVENOUS at 03:01

## 2024-01-15 RX ADMIN — HYDROMORPHONE HYDROCHLORIDE 0.5 MG: 1 INJECTION, SOLUTION INTRAMUSCULAR; INTRAVENOUS; SUBCUTANEOUS at 07:01

## 2024-01-15 RX ADMIN — PIPERACILLIN AND TAZOBACTAM 4.5 G: 4; .5 INJECTION, POWDER, LYOPHILIZED, FOR SOLUTION INTRAVENOUS; PARENTERAL at 03:01

## 2024-01-15 RX ADMIN — MIDAZOLAM HYDROCHLORIDE 2 MG: 1 INJECTION, SOLUTION INTRAMUSCULAR; INTRAVENOUS at 03:01

## 2024-01-15 RX ADMIN — ACETAMINOPHEN 1000 MG: 1000 INJECTION INTRAVENOUS at 09:01

## 2024-01-15 RX ADMIN — INSULIN ASPART 2 UNITS: 100 INJECTION, SOLUTION INTRAVENOUS; SUBCUTANEOUS at 09:01

## 2024-01-15 RX ADMIN — PHENYLEPHRINE HYDROCHLORIDE 200 MCG: 10 INJECTION INTRAVENOUS at 04:01

## 2024-01-15 RX ADMIN — FENTANYL CITRATE 50 MCG: 50 INJECTION, SOLUTION INTRAMUSCULAR; INTRAVENOUS at 05:01

## 2024-01-15 RX ADMIN — SODIUM CHLORIDE: 9 INJECTION, SOLUTION INTRAVENOUS at 01:01

## 2024-01-15 RX ADMIN — OXYCODONE AND ACETAMINOPHEN 1 TABLET: 5; 325 TABLET ORAL at 01:01

## 2024-01-15 RX ADMIN — HYDROMORPHONE HYDROCHLORIDE 0.5 MG: 1 INJECTION, SOLUTION INTRAMUSCULAR; INTRAVENOUS; SUBCUTANEOUS at 01:01

## 2024-01-15 RX ADMIN — PIPERACILLIN AND TAZOBACTAM 4.5 G: 4; .5 INJECTION, POWDER, LYOPHILIZED, FOR SOLUTION INTRAVENOUS; PARENTERAL at 11:01

## 2024-01-15 NOTE — NURSING
Michael's heart rate in 130s. Patient up to bathroom. No distress noted. Will assess heart rate once patient is back in bed.

## 2024-01-15 NOTE — NURSING
Patient's heart rate elevated. Sinus tach 115. Notified Rashed Flavio P-AC at this time. No new orders given.

## 2024-01-15 NOTE — ASSESSMENT & PLAN NOTE
57 yo M who represents with worsening left and right chest pain, LUQ and RUQ abdominal pain, and back pain. He continues to have no leukocytosis, afebrile (documented 102.7 overnight, but on recheck was normal) and HIDA was negative for acute cholecystitis. Additionally, his complaints are not consistent with cholecystitis or biliary colic. His bilirubin is mildly elevated. EKG on arrival yesterday was notable for septal infarct of indeterminate age. Discussed with staff who is in agreement that it does not appear that his gallbladder is the cause of the symptoms, though we do not have a good explanation for what is causing this.     - admitted to medicine service  - with negative HIDA and symptoms that do not line up with cholecystitis or biliary colic, we do not recommend surgery at this time  - with increasing bilirubin, it is reasonable to get MRCP, we will follow up results  - If MRCP shows concerns for biliary pathology, may consider cholecystectomy with the understanding that this may not solve his problems  - EKG previously with ischemic changes, now showing septal infarct age undetermined, workup per primary   - rest of care per primary

## 2024-01-15 NOTE — NURSING
[facility] MEWS TRIGGER FOLLOW UP       MEWS Monitoring, Score is: 5  Indication for review: , Temp 102.7  Called to bedside to assess patient whose heart rate was currently 115 when he has typically been NSR. Waited and reassessed temp of 99.5 and found it to be 102.7. Bedside Coral GIBSON, contacting PA for tylenol orders. Will follow HR and temp.    0315: Temp currently 98.3, HR 94    Contacted by bedside Coral GIBSON.Will follow up with PAIGE.

## 2024-01-15 NOTE — HPI
Malou Suarez is a 58 y.o. with a pmh of DM and HTN who represents to the hospital with complaints of left sided chest pain with radiation to the back on the left. He was recently admitted for similar complaints. He underwent extensive workup. There were some CT findings initially concerning for cholecystitis; however, his symptoms were not consistent with this. HIDA scan showed filling of the gallbladder which ruled out cholecystitis. HE had some ischemic changes on EKG and cardiology workup was otherwise essentially normal. His symptoms improved significantly and he was discharged. He represented the following day with worsening pain. He now has worsening bilateral chest pain worsened with deep breathing. He also has LUQ pain, RUQ pain, back pain and was found to have a compression fracture of L1. He continues to have no fevers, WBC is normal. Bilirubin was 1.1 yesterday and 1.9 today. He denies nausea/vomiting and had a bowel movement yesterday. He is hemodynamically stable.

## 2024-01-15 NOTE — ANESTHESIA PROCEDURE NOTES
Intubation    Date/Time: 1/15/2024 4:02 PM    Performed by: Collins Rascon CRNA  Authorized by: Naveed Richey MD    Intubation:     Induction:  Rapid sequence induction    Intubated:  Postinduction    Mask Ventilation:  Not attempted    Attempts:  1    Attempted By:  CRNA    Method of Intubation:  Video laryngoscopy    Blade:  Medina 3    Laryngeal View Grade: Grade I - full view of cords      Difficult Airway Encountered?: No      Complications:  None    Airway Device:  Oral endotracheal tube    Airway Device Size:  7.5    Style/Cuff Inflation:  Cuffed (inflated to minimal occlusive pressure)    Tube secured:  21    Secured at:  The lips    Placement Verified By:  Capnometry    Complicating Factors:  None    Findings Post-Intubation:  BS equal bilateral and atraumatic/condition of teeth unchanged

## 2024-01-15 NOTE — NURSING
RAPID RESPONSE NURSE PROACTIVE ROUNDING NOTE       Time of Visit: 0758    Admit Date: 2024  LOS: 0  Code Status: Full Code   Date of Visit: 01/15/2024  : 1965  Age: 58 y.o.  Sex: male  Race:   Bed: W407/W407 A:   MRN: [id number]  Was the patient discharged from an ICU this admission? No   Was the patient discharged from a PACU within last 24 hours? No   Did the patient receive conscious sedation/general anesthesia in last 24 hours? No   Was the patient in the ED within the past 24 hours? No   Was the patient on NIPPV within the past 24 hours? No   Attending Physician: Haresh De Oliveira, *  Primary Service: Hospitalist   Time spent at the bedside: 30 - 45 min    SITUATION    Notified by charge RN via phone call  Reason for alert: increased MEWS, Increased abd pain    Diagnosis: Left upper quadrant pain   has a past medical history of Diabetes mellitus and Pulmonary emphysema.    Last Vitals:  Temp: 98.2 °F (36.8 °C) (01/15 0712)  Pulse: 118 (01/15 0731)  Resp: 24 (01/15 0731)  BP: 124/75 (01/15 0712)  SpO2: 95 % (01/15 0712)    24 Hour Vitals Range:  Temp:  [97.4 °F (36.3 °C)-102.7 °F (39.3 °C)]   Pulse:  []   Resp:  [16-32]   BP: (119-128)/(65-75)   SpO2:  [88 %-97 %]     Clinical Issues:  GI?     ASSESSMENT/INTERVENTIONS    Pt in bed, awake complaining of abd pain. HR elevated and RR increased.     RECOMMENDATIONS  MD to bedside, increase pain meds per orders.     Discussed plan of care with bedside RNSydnee.     PROVIDER ESCALATION    Physician escalation: Yes    Orders received and case discussed with Dr. Alvares .    Disposition:Remain in room 407    FOLLOW UP    Call back the Rapid Response NurseUrmila at [phone] for additional questions or concerns.

## 2024-01-15 NOTE — PLAN OF CARE
Patient alert and oriented x4. Patient Ukrainian speaking.  utilized as needed. Respirations even and unlabored. 02 at 2l. Skin warm and dry. Iv saline locked. No complications noted. Patient complains of pain to chest,back, and abdomen. Pain medication given as needed. Blood sugar monitored. Insulin given as ordered. Patient has no complaints at this time. Instructed to call for any needs. Bed in lowest position. Call bell within reach.      Problem: Adult Inpatient Plan of Care  Goal: Plan of Care Review  Outcome: Ongoing, Progressing  Flowsheets (Taken 1/15/2024 0549)  Plan of Care Reviewed With: patient  Goal: Patient-Specific Goal (Individualized)  Outcome: Ongoing, Progressing  Goal: Absence of Hospital-Acquired Illness or Injury  Outcome: Ongoing, Progressing  Intervention: Prevent and Manage VTE (Venous Thromboembolism) Risk  Flowsheets (Taken 1/15/2024 0549)  Activity Management: Ambulated to bathroom - L4     Problem: Diabetes Comorbidity  Goal: Blood Glucose Level Within Targeted Range  Outcome: Ongoing, Progressing  Intervention: Monitor and Manage Glycemia  Flowsheets (Taken 1/15/2024 0549)  Glycemic Management:   blood glucose monitored   supplemental insulin given      see progress note

## 2024-01-15 NOTE — SUBJECTIVE & OBJECTIVE
No current facility-administered medications on file prior to encounter.     Current Outpatient Medications on File Prior to Encounter   Medication Sig    alogliptin-pioglitazone 12.5-15 mg Tab Take by mouth once daily.    aspirin (ECOTRIN) 81 MG EC tablet Take 1 tablet (81 mg total) by mouth once daily.    atorvastatin (LIPITOR) 40 MG tablet Take 1 tablet (40 mg total) by mouth once daily.    BASAGLAR KWIKPEN U-100 INSULIN glargine 100 units/mL SubQ pen SMARTSI Unit(s) SUB-Q Every Night    empagliflozin 10 mg Tab Take 10 mg by mouth once daily.    ergocalciferol (ERGOCALCIFEROL) 50,000 unit Cap Take 50,000 Units by mouth every 7 days.    insulin aspart protamine-insulin aspart (NOVOLOG 70/30) 100 unit/mL (70-30) InPn pen Inject into the skin 2 (two) times daily before meals.    insulin glargine (LANTUS) 100 unit/mL injection Inject into the skin every evening.    JANUMET -1,000 mg TM24 Take 1 tablet by mouth every morning.    losartan (COZAAR) 25 MG tablet Take 25 mg by mouth.    traMADoL (ULTRAM) 50 mg tablet Take 1 tablet (50 mg total) by mouth every 8 (eight) hours as needed for Pain.    VEMLIDY 25 mg Tab Take 1 tablet by mouth.       Review of patient's allergies indicates:  No Known Allergies    Past Medical History:   Diagnosis Date    Diabetes mellitus     Pulmonary emphysema 2024     No past surgical history on file.  Family History    None       Tobacco Use    Smoking status: Former    Smokeless tobacco: Not on file   Substance and Sexual Activity    Alcohol use: No    Drug use: No    Sexual activity: Not on file     Review of Systems   Constitutional:  Positive for appetite change.   HENT: Negative.     Respiratory:  Positive for chest tightness and shortness of breath.    Cardiovascular:  Positive for chest pain.        Left-sided squeezing/sharp chest pain   Gastrointestinal:  Positive for abdominal pain.   Genitourinary: Negative.    Musculoskeletal:  Positive for back pain and  myalgias.        Mostly on left thoracic spine   Neurological: Negative.    Psychiatric/Behavioral: Negative.       Objective:     Vital Signs (Most Recent):  Temp: 98.2 °F (36.8 °C) (01/15/24 0712)  Pulse: (!) 118 (01/15/24 0731)  Resp: (!) 22 (01/15/24 0922)  BP: 124/75 (01/15/24 0712)  SpO2: 95 % (01/15/24 0712) Vital Signs (24h Range):  Temp:  [97.4 °F (36.3 °C)-102.7 °F (39.3 °C)] 98.2 °F (36.8 °C)  Pulse:  [] 118  Resp:  [16-32] 22  SpO2:  [88 %-97 %] 95 %  BP: (119-128)/(65-75) 124/75     Weight: 70.6 kg (155 lb 10.3 oz)  Body mass index is 25.9 kg/m².     Physical Exam  Constitutional:       General: He is in acute distress.      Appearance: Normal appearance.      Comments: Patient visibly in pain, moving around in the bed    HENT:      Head: Normocephalic and atraumatic.      Mouth/Throat:      Mouth: Mucous membranes are moist.   Eyes:      Extraocular Movements: Extraocular movements intact.   Cardiovascular:      Rate and Rhythm: Normal rate and regular rhythm.      Pulses: Normal pulses.      Comments: EKG shows sinus bradycardia, no STEMI at 58 bpm  Pulmonary:      Effort: Pulmonary effort is normal.   Abdominal:      General: Abdomen is flat.      Tenderness: There is abdominal tenderness. There is no right CVA tenderness, left CVA tenderness or guarding.      Comments: His abdomen is non-distended, some LUQ pain and RUQ pain with voluntary guarding.    Musculoskeletal:      Right lower leg: No edema.      Left lower leg: No edema.      Comments: Left nad right sided lateral chest wall pain, mildly ttp.    Skin:     General: Skin is warm.   Neurological:      General: No focal deficit present.      Mental Status: He is alert and oriented to person, place, and time. Mental status is at baseline.   Psychiatric:         Mood and Affect: Mood normal.         Behavior: Behavior normal.         Thought Content: Thought content normal.            I have reviewed all pertinent lab results within the  past 24 hours.  CBC:   Recent Labs   Lab 01/15/24  0523   WBC 10.59   RBC 5.29   HGB 15.1   HCT 47.2      MCV 89   MCH 28.5   MCHC 32.0     CMP:   Recent Labs   Lab 01/15/24  0523   *   CALCIUM 8.7   ALBUMIN 3.2*   PROT 7.3   *   K 4.1   CO2 25   CL 99   BUN 27*   CREATININE 0.8   ALKPHOS 36*   ALT 35   AST 23   BILITOT 1.9*       Significant Diagnostics:  I have reviewed all pertinent imaging results/findings within the past 24 hours.

## 2024-01-15 NOTE — TRANSFER OF CARE
"Anesthesia Transfer of Care Note    Patient: Malou Suarez    Procedure(s) Performed: Procedure(s) (LRB):  LAPAROSCOPIC CHOLECYSTECTOMY (N/A)    Patient location: PACU    Anesthesia Type: general    Transport from OR: Transported from OR on 6-10 L/min O2 by face mask with adequate spontaneous ventilation    Post pain: adequate analgesia    Post assessment: no apparent anesthetic complications    Post vital signs: stable    Level of consciousness: sedated and responds to stimulation    Nausea/Vomiting: no nausea/vomiting    Complications: none    Transfer of care protocol was followed      Last vitals: Visit Vitals  BP (!) 111/57 (BP Location: Left arm)   Pulse 102   Temp 36.3 °C (97.4 °F) (Temporal)   Resp 18   Ht 5' 5" (1.651 m)   Wt 70.6 kg (155 lb 10.3 oz)   SpO2 99%   BMI 25.90 kg/m²     "

## 2024-01-15 NOTE — PROGRESS NOTES
[facility] Medicine  Progress Note    Patient Name: Malou Suarez  MRN: [id number]  Patient Class: OP- Observation   Admission Date: 1/13/2024  Length of Stay: 0 days  Attending Physician: Haresh De Oliveira, *  Primary Care Provider: Fred Dia MD        Subjective:     Principal Problem:Left upper quadrant pain        HPI:  Malou Suarez is a 58 y.o. with a pmh of DM and HTN presents to the hospital with complaints of left sided chest pain with radiation to the back on the left. Patient was recently admitted with left upper quadrant pain associated with n/v and seen by both Surgery and Cardiology. Surgery cleared patient as HIDA scan showed no acute cholecystitis.  Cardiology also cleared patient for atypical chest pain with follow-up stress test outpatient. He was discharged on 01/13/2024 from the hospital and returned the same day to the ED similar complaints. He states that he was pain-free upon discharge but pain returned at home which prompted his return.  He describes pain as squeezing and sharp on the left side of his chest which radiates to his left back mostly in the thoracic region. Patient states that he is unable to tolerate anything by mouth due to his pain. Patient took the pain medicine given to him on discharge without much relief. He denies any alleviating or exacerbating factors. Patient denies headache, fever, blurry vision, nausea, vomiting, diarrhea, melena, hematemesis, hematochezia, hematuria, or any other associated symptoms. Of note he states that he did not take his blood pressure and diabetes medications prior to arrival.     service was used due to language barrier.  ID# [id number]    In the ED: Patient is afebrile without leukocytosis, hypertensive on presentation 189/75, 98 % O2 sat on RA, CBC unremarkable, D-dimer 0.5, magnesium 1.5, ALP 41, troponin normal, POCT glucose 250, CTA chest (1) no evidence of PE no acute intrathoracic abnormality 2)  mild to moderate pulmonary emphysema, EKG shows sinus bradycardia, no STEMI at 58 bpm. Patient given cyclobenzaprine 10 mg, ketorolac 15 mg IV, morphine 4 mg IV x2 in the ED.    Case discussed with ED provider.    Malou Suarez we will be placed under observation for further medical management of his intractable pain.    Overview/Hospital Course:  Malou Suarez is a 58 y.o. with a pmh of DM and HTN presents to the hospital with complaints of left sided chest pain with radiation to the back on the left. Patient was recently admitted with left upper quadrant pain associated with n/v and seen by both Surgery and Cardiology. Surgery cleared patient as HIDA scan showed no acute cholecystitis.  Cardiology also cleared patient for atypical chest pain with follow-up stress test outpatient. He was discharged on 01/13/2024 from the hospital and returned the same day to the ED similar complaints.   In the ED: Patient is afebrile without leukocytosis, hypertensive on presentation 189/75, 98 % O2 sat on RA, CBC unremarkable, D-dimer 0.5, magnesium 1.5, ALP 41, troponin normal, POCT glucose 250, CTA chest (1) no evidence of PE no acute intrathoracic abnormality 2) mild to moderate pulmonary emphysema, EKG shows sinus bradycardia, no STEMI at 58 bpm. Patient given cyclobenzaprine 10 mg, ketorolac 15 mg IV, morphine 4 mg IV x2 in the ED.  TB is up,will have MRCP in AM.      Past Medical History:   Diagnosis Date    Diabetes mellitus     Pulmonary emphysema 01/14/2024       No past surgical history on file.    Review of patient's allergies indicates:  No Known Allergies    No current facility-administered medications on file prior to encounter.     Current Outpatient Medications on File Prior to Encounter   Medication Sig    alogliptin-pioglitazone 12.5-15 mg Tab Take by mouth once daily.    aspirin (ECOTRIN) 81 MG EC tablet Take 1 tablet (81 mg total) by mouth once daily.    atorvastatin (LIPITOR) 40 MG tablet Take 1 tablet (40 mg total)  by mouth once daily.    BASAGLAR KWIKPEN U-100 INSULIN glargine 100 units/mL SubQ pen SMARTSI Unit(s) SUB-Q Every Night    empagliflozin 10 mg Tab Take 10 mg by mouth once daily.    ergocalciferol (ERGOCALCIFEROL) 50,000 unit Cap Take 50,000 Units by mouth every 7 days.    insulin aspart protamine-insulin aspart (NOVOLOG 70/30) 100 unit/mL (70-30) InPn pen Inject into the skin 2 (two) times daily before meals.    insulin glargine (LANTUS) 100 unit/mL injection Inject into the skin every evening.    JANUMET -1,000 mg TM24 Take 1 tablet by mouth every morning.    losartan (COZAAR) 25 MG tablet Take 25 mg by mouth.    traMADoL (ULTRAM) 50 mg tablet Take 1 tablet (50 mg total) by mouth every 8 (eight) hours as needed for Pain.    VEMLIDY 25 mg Tab Take 1 tablet by mouth.     Family History    None       Tobacco Use    Smoking status: Former    Smokeless tobacco: Not on file   Substance and Sexual Activity    Alcohol use: No    Drug use: No    Sexual activity: Not on file     Review of Systems   Constitutional:  Positive for appetite change.   HENT: Negative.     Respiratory: Negative.     Cardiovascular:  Positive for chest pain.        Left-sided squeezing/sharp chest pain   Gastrointestinal:  Positive for abdominal pain.   Genitourinary: Negative.    Musculoskeletal:  Positive for back pain and myalgias.        Mostly on left thoracic spine   Neurological: Negative.    Psychiatric/Behavioral: Negative.       Objective:     Vital Signs (Most Recent):  Temp: 99.7 °F (37.6 °C) (01/15/24 1057)  Pulse: (!) 119 (01/15/24 105)  Resp: 13 (01/15/24 1057)  BP: (!) 98/55 (01/15/24 105)  SpO2: (!) 94 % (01/15/24 105) Vital Signs (24h Range):  Temp:  [97.4 °F (36.3 °C)-102.7 °F (39.3 °C)] 99.7 °F (37.6 °C)  Pulse:  [] 119  Resp:  [13-32] 13  SpO2:  [88 %-97 %] 94 %  BP: ()/(55-75) 98/55     Weight: 70.6 kg (155 lb 10.3 oz)  Body mass index is 25.9 kg/m².     Physical Exam  Constitutional:       General: He  is in acute distress.      Appearance: Normal appearance.      Comments: Patient visibly in pain, moving around in the bed    HENT:      Head: Normocephalic and atraumatic.      Mouth/Throat:      Mouth: Mucous membranes are moist.   Eyes:      Extraocular Movements: Extraocular movements intact.   Cardiovascular:      Rate and Rhythm: Regular rhythm. Bradycardia present.      Pulses: Normal pulses.      Comments: EKG shows sinus bradycardia, no STEMI at 58 bpm  Pulmonary:      Effort: Pulmonary effort is normal.   Abdominal:      General: Abdomen is flat.      Tenderness: There is abdominal tenderness. There is no right CVA tenderness, left CVA tenderness or guarding.      Comments: Left upper quadrant.  Negative Velasquez's sign   Musculoskeletal:      Right lower leg: No edema.      Left lower leg: No edema.      Comments: Left-sided chest pain that radiates to the left paraspinal musculature of the thoracic spine. + TTP, no erythema, no ecchymosis   Skin:     General: Skin is warm.   Neurological:      General: No focal deficit present.      Mental Status: He is alert and oriented to person, place, and time. Mental status is at baseline.   Psychiatric:         Mood and Affect: Mood normal.         Behavior: Behavior normal.         Thought Content: Thought content normal.             Significant Labs: All pertinent labs within the past 24 hours have been reviewed.  Bilirubin:   Recent Labs   Lab 01/11/24  2300 01/13/24  0932 01/13/24 1809 01/14/24  0530 01/15/24  0523   BILITOT 0.4 0.8 0.6 1.1* 1.9*       BMP:   Recent Labs   Lab 01/15/24  0523   *   *   K 4.1   CL 99   CO2 25   BUN 27*   CREATININE 0.8   CALCIUM 8.7   MG 2.0       CBC:   Recent Labs   Lab 01/13/24 1809 01/14/24  0530 01/15/24  0523   WBC 11.18 11.69 10.59   HGB 14.7 13.5* 15.1   HCT 44.3 41.1 47.2    221 201       CMP:   Recent Labs   Lab 01/13/24 1809 01/14/24  0530 01/15/24  0523    138 135*   K 3.8 3.6 4.1     "105 99   CO2 22* 22* 25   * 196* 160*   BUN 17 23* 27*   CREATININE 0.8 0.8 0.8   CALCIUM 8.7 8.4* 8.7   PROT 7.5 7.0 7.3   ALBUMIN 3.8 3.5 3.2*   BILITOT 0.6 1.1* 1.9*   ALKPHOS 41* 41* 36*   AST 15 19 23   ALT 40 35 35   ANIONGAP 11 11 11       Lipase:   Recent Labs   Lab 01/13/24  1809   LIPASE 33       Magnesium:   Recent Labs   Lab 01/14/24  0530 01/15/24  0523   MG 1.6 2.0       POCT Glucose:   Recent Labs   Lab 01/14/24  2006 01/15/24  0713 01/15/24  1056   POCTGLUCOSE 193* 170* 199*       Troponin:   Recent Labs   Lab 01/13/24  1809   TROPONINI <0.006       Urine Studies:   No results for input(s): "COLORU", "APPEARANCEUA", "PHUR", "SPECGRAV", "PROTEINUA", "GLUCUA", "KETONESU", "BILIRUBINUA", "OCCULTUA", "NITRITE", "UROBILINOGEN", "LEUKOCYTESUR", "RBCUA", "WBCUA", "BACTERIA", "SQUAMEPITHEL", "HYALINECASTS" in the last 48 hours.    Invalid input(s): "WRIGHTSUR"    Significant Imaging: I have reviewed all pertinent imaging results/findings within the past 24 hours.  Imaging Results              X-Ray Thoracic Spine AP Lateral (Final result)  Result time 01/14/24 10:17:53      Final result by Ruben Collado MD (01/14/24 10:17:53)                   Impression:      As above.      Electronically signed by: Ruben Collado MD  Date:    01/14/2024  Time:    10:17               Narrative:    EXAMINATION:  XR THORACIC SPINE AP LATERAL    CLINICAL HISTORY:  back pain;    TECHNIQUE:  AP and lateral views of the thoracic spine.    COMPARISON:  None.    FINDINGS:  Alignment: Alignment is maintained.    Vertebrae: Vertebral body heights are maintained.  No suspicious appearing lytic or blastic lesions.    Discs and facets: Disc heights are maintained.  Multilevel marginal osteophytes noted.    Miscellaneous: No additional findings.                                       X-Ray Lumbar Spine Ap And Lateral (Final result)  Result time 01/14/24 10:19:51      Final result by Ruben Collado MD (01/14/24 10:19:51)        "            Impression:      As above.      Electronically signed by: Ruben Collado MD  Date:    01/14/2024  Time:    10:19               Narrative:    EXAMINATION:  XR LUMBAR SPINE AP AND LATERAL    CLINICAL HISTORY:  back pain;    TECHNIQUE:  AP, lateral and spot images were performed of the lumbar spine.    COMPARISON:  None    FINDINGS:  Alignment: Minimal levoconvex curvature of the lumbar spine.  Anteroposterior alignment is maintained.    Vertebrae: Vertebral body heights are maintained.  No suspicious appearing lytic or blastic lesions.    Discs and facets: Disc heights are maintained.  Small marginal osteophytes noted.  There is lower lumbar facet arthropathy.    Miscellaneous: Aortic calcification noted.                                       CTA Chest Non-Coronary (PE Studies) (Final result)  Result time 01/13/24 20:26:03      Final result by Giovanni Mckinney MD (01/13/24 20:26:03)                   Impression:      1. No evidence of PE.  No acute intrathoracic abnormalities identified.  2. Mild to moderate pulmonary emphysema.      Electronically signed by: Giovanni Mckinney MD  Date:    01/13/2024  Time:    20:26               Narrative:    EXAMINATION:  CTA CHEST NON CORONARY (PE STUDIES)    CLINICAL HISTORY:  Pulmonary embolism (PE) suspected, positive D-dimer;    TECHNIQUE:  Low dose axial images, sagittal and coronal reformations were obtained from the thoracic inlet to the lung bases following the IV administration of 75 mL of Omnipaque 350.  Contrast timing was optimized to evaluate the pulmonary arteries.  MIP images were performed.    COMPARISON:  None    FINDINGS:  Structures at the base of the neck are unremarkable.  Aorta is non-aneurysmal.  The heart is normal in size without pericardial effusion.  Mild coronary artery calcification is seen.  No intraluminal filling defects within the pulmonary arteries to suggest pulmonary thromboembolism.   There is no evidence of mediastinal, axillary, or  hilar lymph node enlargement.  The esophagus is unremarkable along its course.    The trachea and bronchi are patent.  The lungs are symmetrically expanded.  Mild to moderate emphysematous changes are seen within the upper lobes.  Lungs show no consolidation, pleural effusion, pulmonary hemorrhage, or infarction.    The visualized abdominal structures show no acute abnormalities.  Multiple remote right rib fracture deformities are seen.  Extrathoracic soft tissues are unremarkable.                                      Assessment/Plan:      * Left upper quadrant pain  -Patient returns back to the hospital after being discharged earlier today for LUQ and left-sided chest wall pain radiating to his back. Patient denies nausea, vomiting, fever, or shortness a breath  -Patient had extensive workup done prior to discharge by Surgery and Cardiology.  -CT abdomen showed possible cholecystitis and Surgery was consulted.  HIDA scan done and showed normal filling of gallbladder with no evidence acute cholecystitis.  -Cardiology consulted for left-sided atypical chest pain. Echo with WMA and stress test recommended outpatient.  -Patient was cleared from both Surgery and Cardiology and discharged with outpatient followups  -Presentation today:  Acute cholecystitis ruled out, EKG without any ST changes, likely combination of atypical chest pain versus musculoskeletal mainly thoracic spine issue    Plan:  NPO advance as tolerated  CT thoracic spine ordered along with x-rays of the spine  Pain control with p.r.n. analgesics and patch  TB is up,will have MRCP in AM.    Pulmonary emphysema  F/u Pulm as well for PFTs    Elevated d-dimer  -Patient presents with elevated 189/75, with normal respiration and pulse, slightly short of breath due to pain found to have D-dimer 0.50 (right at the cutoff).  -CTA chest ordered and showed no evidence of PE or acute intrathoracic abnormalities however it did show mild to moderate pulmonary  "emphysema  -PE ruled out    Hyperlipidemia  Continue home statin therapy    Acute left-sided thoracic back pain  -see above  -CT thoracic spine and x-ray ordered  -Continue p.r.n. analgesics and lidocaine patches ordered  -Continue to monitor    Type 2 diabetes mellitus  Patient's FSGs are controlled on current medication regimen.  Last A1c reviewed- No results found for: "LABA1C", "HGBA1C"  Most recent fingerstick glucose reviewed-   Recent Labs   Lab 01/13/24  0728 01/13/24  1125 01/13/24  1805   POCTGLUCOSE 91 185* 250*     Current correctional scale  Medium  Maintain anti-hyperglycemic dose as follows-   Antihyperglycemics (From admission, onward)      Start     Stop Route Frequency Ordered    01/14/24 0042  insulin aspart U-100 pen 0-10 Units         -- SubQ Every 6 hours PRN 01/13/24 2342          SSI  Levemir for basal insulin  Accu-Cheks  Hold Oral hypoglycemics while patient is in the hospital.    Hypertension  Chronic, controlled. Latest blood pressure and vitals reviewed-     Temp:  [97.8 °F (36.6 °C)-98.3 °F (36.8 °C)]   Pulse:  [57-95]   Resp:  [14-28]   BP: (114-189)/(56-75)   SpO2:  [93 %-99 %] .   Home meds for hypertension were reviewed and noted below.   Hypertension Medications               losartan (COZAAR) 25 MG tablet Take 25 mg by mouth.          While in the hospital, will manage blood pressure as follows; Continue home antihypertensive regimen    Will utilize p.r.n. blood pressure medication only if patient's blood pressure greater than 180/110 and he develops symptoms such as worsening chest pain or shortness of breath.    Positive colorectal cancer screening using Cologuard test  Per chart review  Patient with positive Cologuard test  CEA ordered and pending  Recommendation was for GI follow-up and colonoscopy outpatient    Other chest pain  Patient with left-sided atypical chest pain. Seen by Dr. Pavon (Cardiology).  Echo showed no wall abnormalities with normal EF  -Troponin normal, EKG " with sinus bradycardia, patient denies shortness of breath, diaphoresis, weakness, nausea/vomiting  -Stress test was recommended outpatient if patient was not willing to stay in-house until Tuesday  -Continue home aspirin/statin  -Cardiology consulted if stress test can be done sooner      VTE Risk Mitigation (From admission, onward)           Ordered     IP VTE LOW RISK PATIENT  Once         01/13/24 2251     Place sequential compression device  Until discontinued         01/13/24 2251                    Discharge Planning   ADRIA:      Code Status: Full Code   Is the patient medically ready for discharge?:     Reason for patient still in hospital (select all that apply): Patient trending condition  Discharge Plan A: Home with family                  Haresh De Oliveira MD  Department of Hospital Medicine   [facility]

## 2024-01-15 NOTE — PLAN OF CARE
[place] [facility]  Discharge Assessment    Primary Care Provider: Fred Dia MD   Case Management Assessment     PCP: See above  Pharmacy: [facility] [place] [facility] on [address]    Patient Arrived From: Home with spouse  Existing Help at Home: wife    Barriers to Discharge: None    Discharge Plan:    A. Home with family   B. Home      Discharge planning assessment completed with assistance from patient's wife, Karime Brunson, at the bedside.  She reported that patient is independent and has no DMe or OP services.  Patient recently discharged and return yesterday.  Patient will need to followup with providers as ordered by the discharging physician.    Language Line :  [place]: ID# [id number]  Wife:  Karime Brunson; [phone]    Wife stated that if information is needed, Ms. Tammie Tong, should be contacted @ [phone].  She ask that we not contact her children because they are in school.            Discharge Assessment (most recent)       BRIEF DISCHARGE ASSESSMENT - 01/14/24 9404          Discharge Planning    Assessment Type Discharge Planning Brief Assessment     Resource/Environmental Concerns none     Support Systems Spouse/significant other;Children     Assistance Needed none     Equipment Currently Used at Home none     Current Living Arrangements home     Care Facility Name n/a     Patient/Family Anticipates Transition to home with family     Patient/Family Anticipated Services at Transition outpatient care   F/u with PCP    DME Needed Upon Discharge  none     Discharge Plan A Home with family     Discharge Plan B Home

## 2024-01-15 NOTE — CONSULTS
[facility] Surg  General Surgery  Consult Note    Patient Name: Malou Suarez  MRN: [id number]  Code Status: Full Code  Admission Date: 1/13/2024  Hospital Length of Stay: 0 days  Attending Physician: Haresh De Oliveira, *  Primary Care Provider: Fred Dia MD    Patient information was obtained from patient and ER records.     Inpatient consult to General Surgery  Consult performed by: Ari Vu MD  Consult ordered by: Tello Ng MD        Subjective:     Principal Problem: Left upper quadrant pain    History of Present Illness: Malou Suarez is a 58 y.o. with a pmh of DM and HTN who represents to the hospital with complaints of left sided chest pain with radiation to the back on the left. He was recently admitted for similar complaints. He underwent extensive workup. There were some CT findings initially concerning for cholecystitis; however, his symptoms were not consistent with this. HIDA scan showed filling of the gallbladder which ruled out cholecystitis. HE had some ischemic changes on EKG and cardiology workup was otherwise essentially normal. His symptoms improved significantly and he was discharged. He represented the following day with worsening pain. He now has worsening bilateral chest pain worsened with deep breathing. He also has LUQ pain, RUQ pain, back pain and was found to have a compression fracture of L1. He continues to have no fevers, WBC is normal. Bilirubin was 1.1 yesterday and 1.9 today. He denies nausea/vomiting and had a bowel movement yesterday. He is hemodynamically stable.     No current facility-administered medications on file prior to encounter.     Current Outpatient Medications on File Prior to Encounter   Medication Sig    alogliptin-pioglitazone 12.5-15 mg Tab Take by mouth once daily.    aspirin (ECOTRIN) 81 MG EC tablet Take 1 tablet (81 mg total) by mouth once daily.    atorvastatin (LIPITOR) 40 MG tablet Take 1 tablet (40 mg total) by mouth once daily.     BASAGLAR KWIKPEN U-100 INSULIN glargine 100 units/mL SubQ pen SMARTSI Unit(s) SUB-Q Every Night    empagliflozin 10 mg Tab Take 10 mg by mouth once daily.    ergocalciferol (ERGOCALCIFEROL) 50,000 unit Cap Take 50,000 Units by mouth every 7 days.    insulin aspart protamine-insulin aspart (NOVOLOG 70/30) 100 unit/mL (70-30) InPn pen Inject into the skin 2 (two) times daily before meals.    insulin glargine (LANTUS) 100 unit/mL injection Inject into the skin every evening.    JANUMET -1,000 mg TM24 Take 1 tablet by mouth every morning.    losartan (COZAAR) 25 MG tablet Take 25 mg by mouth.    traMADoL (ULTRAM) 50 mg tablet Take 1 tablet (50 mg total) by mouth every 8 (eight) hours as needed for Pain.    VEMLIDY 25 mg Tab Take 1 tablet by mouth.       Review of patient's allergies indicates:  No Known Allergies    Past Medical History:   Diagnosis Date    Diabetes mellitus     Pulmonary emphysema 2024     No past surgical history on file.  Family History    None       Tobacco Use    Smoking status: Former    Smokeless tobacco: Not on file   Substance and Sexual Activity    Alcohol use: No    Drug use: No    Sexual activity: Not on file     Review of Systems   Constitutional:  Positive for appetite change.   HENT: Negative.     Respiratory:  Positive for chest tightness and shortness of breath.    Cardiovascular:  Positive for chest pain.        Left-sided squeezing/sharp chest pain   Gastrointestinal:  Positive for abdominal pain.   Genitourinary: Negative.    Musculoskeletal:  Positive for back pain and myalgias.        Mostly on left thoracic spine   Neurological: Negative.    Psychiatric/Behavioral: Negative.       Objective:     Vital Signs (Most Recent):  Temp: 98.2 °F (36.8 °C) (01/15/24 0712)  Pulse: (!) 118 (01/15/24 0731)  Resp: (!) 22 (01/15/24 0922)  BP: 124/75 (01/15/24 0712)  SpO2: 95 % (01/15/24 0712) Vital Signs (24h Range):  Temp:  [97.4 °F (36.3 °C)-102.7 °F (39.3 °C)] 98.2 °F (36.8  °C)  Pulse:  [] 118  Resp:  [16-32] 22  SpO2:  [88 %-97 %] 95 %  BP: (119-128)/(65-75) 124/75     Weight: 70.6 kg (155 lb 10.3 oz)  Body mass index is 25.9 kg/m².     Physical Exam  Constitutional:       General: He is in acute distress.      Appearance: Normal appearance.      Comments: Patient visibly in pain, moving around in the bed    HENT:      Head: Normocephalic and atraumatic.      Mouth/Throat:      Mouth: Mucous membranes are moist.   Eyes:      Extraocular Movements: Extraocular movements intact.   Cardiovascular:      Rate and Rhythm: Normal rate and regular rhythm.      Pulses: Normal pulses.      Comments: EKG shows sinus bradycardia, no STEMI at 58 bpm  Pulmonary:      Effort: Pulmonary effort is normal.   Abdominal:      General: Abdomen is flat.      Tenderness: There is abdominal tenderness. There is no right CVA tenderness, left CVA tenderness or guarding.      Comments: His abdomen is non-distended, some LUQ pain and RUQ pain with voluntary guarding.    Musculoskeletal:      Right lower leg: No edema.      Left lower leg: No edema.      Comments: Left nad right sided lateral chest wall pain, mildly ttp.    Skin:     General: Skin is warm.   Neurological:      General: No focal deficit present.      Mental Status: He is alert and oriented to person, place, and time. Mental status is at baseline.   Psychiatric:         Mood and Affect: Mood normal.         Behavior: Behavior normal.         Thought Content: Thought content normal.            I have reviewed all pertinent lab results within the past 24 hours.  CBC:   Recent Labs   Lab 01/15/24  0523   WBC 10.59   RBC 5.29   HGB 15.1   HCT 47.2      MCV 89   MCH 28.5   MCHC 32.0     CMP:   Recent Labs   Lab 01/15/24  0523   *   CALCIUM 8.7   ALBUMIN 3.2*   PROT 7.3   *   K 4.1   CO2 25   CL 99   BUN 27*   CREATININE 0.8   ALKPHOS 36*   ALT 35   AST 23   BILITOT 1.9*       Significant Diagnostics:  I have reviewed all  pertinent imaging results/findings within the past 24 hours.    Assessment/Plan:     * Left upper quadrant pain  57 yo M who represents with worsening left and right chest pain, LUQ and RUQ abdominal pain, and back pain. He continues to have no leukocytosis, afebrile (documented 102.7 overnight, but on recheck was normal) and HIDA was negative for acute cholecystitis. Additionally, his complaints are not consistent with cholecystitis or biliary colic. His bilirubin is mildly elevated. EKG on arrival yesterday was notable for septal infarct of indeterminate age. Discussed with staff who is in agreement that it does not appear that his gallbladder is the cause of the symptoms, though we do not have a good explanation for what is causing this.     - admitted to medicine service  - with negative HIDA and symptoms that do not line up with cholecystitis or biliary colic, we do not recommend surgery at this time  - with increasing bilirubin, it is reasonable to get MRCP, we will follow up results  - If MRCP shows concerns for biliary pathology, may consider cholecystectomy with the understanding that this may not solve his problems  - EKG previously with ischemic changes, now showing septal infarct age undetermined, workup per primary   - rest of care per primary      VTE Risk Mitigation (From admission, onward)           Ordered     IP VTE LOW RISK PATIENT  Once         01/13/24 2251     Place sequential compression device  Until discontinued         01/13/24 2251                    Thank you for your consult. I will follow-up with patient. Please contact us if you have any additional questions.    Ari uV MD  General Surgery  [facility] - Med Surg

## 2024-01-15 NOTE — PLAN OF CARE
Problem: Physical Therapy  Goal: Physical Therapy Goal  Description: Goals to be met by:  2024    Patient will increase functional independence with mobility by performin. Supine to sit with Modified [place]  2. Sit to supine with Modified [place]  3. Sit to stand transfer with Modified [place]  4. Gait  x 400 feet with Modified [place] using Rolling Walker.    Recommend: Low Intensity Therapy & TTB at time of discharge to home with family.        Outcome: Ongoing, Progressing

## 2024-01-15 NOTE — BRIEF OP NOTE
[facility] Surg  Brief Operative Note    SUMMARY     Surgery Date: 1/15/2024     Surgeon(s) and Role:     * Rosaura Mack MD - Primary    Assisting Surgeon: Ari Vu MD - Resident     Pre-op Diagnosis:  Acute Cholecystitis     Post-op Diagnosis:  Emphysematous cholecystitis    Procedure(s) (LRB):  LAPAROSCOPIC CHOLECYSTECTOMY (N/A)    Anesthesia: General    Implants:      Operative Findings: Laparoscopic cholecystectomy performed. Significant inflammation with signs of necrosis. Difficult case    Estimated Blood Loss: 250 mL           Specimens:   Specimen (24h ago, onward)       Start     Ordered    01/15/24 1621  Specimen to Pathology, Surgery General Surgery  Once        Comments: Pre-op Diagnosis: Intractable pain [R52]Procedure(s):LAPAROSCOPIC CHOLECYSTECTOMY Number of specimens: 1Name of specimens: Gallbladder     References:    Click here for ordering Quick Tip   Question Answer Comment   Procedure Type: General Surgery    Which provider would you like to cc? ROSAURA MACK    Release to patient Immediate        01/15/24 1706                    [id number]

## 2024-01-15 NOTE — ANESTHESIA PREPROCEDURE EVALUATION
01/15/2024  Malou Suarez is a 58 y.o., male.  To undergo Procedure(s) (LRB):  ROBOTIC CHOLECYSTECTOMY (N/A)     Denies CP/SOB/GERD/MI/CVA/URI symptoms.  METS > 4  NPO < 8    Past Medical History:  Past Medical History:   Diagnosis Date    Diabetes mellitus     Pulmonary emphysema 2024       Past Surgical History:  No past surgical history on file.    Social History:  Social History     Socioeconomic History    Marital status:    Tobacco Use    Smoking status: Former   Substance and Sexual Activity    Alcohol use: No    Drug use: No       Medications:  No current facility-administered medications on file prior to encounter.     Current Outpatient Medications on File Prior to Encounter   Medication Sig Dispense Refill    alogliptin-pioglitazone 12.5-15 mg Tab Take by mouth once daily.      aspirin (ECOTRIN) 81 MG EC tablet Take 1 tablet (81 mg total) by mouth once daily. 90 tablet 3    atorvastatin (LIPITOR) 40 MG tablet Take 1 tablet (40 mg total) by mouth once daily. 90 tablet 3    BASAGLAR KWIKPEN U-100 INSULIN glargine 100 units/mL SubQ pen SMARTSI Unit(s) SUB-Q Every Night      empagliflozin 10 mg Tab Take 10 mg by mouth once daily.      ergocalciferol (ERGOCALCIFEROL) 50,000 unit Cap Take 50,000 Units by mouth every 7 days.      insulin aspart protamine-insulin aspart (NOVOLOG 70/30) 100 unit/mL (70-30) InPn pen Inject into the skin 2 (two) times daily before meals.      insulin glargine (LANTUS) 100 unit/mL injection Inject into the skin every evening.      JANUMET -1,000 mg TM24 Take 1 tablet by mouth every morning.      losartan (COZAAR) 25 MG tablet Take 25 mg by mouth.      traMADoL (ULTRAM) 50 mg tablet Take 1 tablet (50 mg total) by mouth every 8 (eight) hours as needed for Pain. 20 tablet 0    VEMLIDY 25 mg Tab Take 1 tablet by mouth.         Allergies:  Review of patient's allergies indicates:  No Known Allergies    Active Problems:  Patient Active Problem List   Diagnosis    Chest  pain on breathing    Bronchitis    Left upper quadrant pain    Other chest pain    Positive colorectal cancer screening using Cologuard test    Hypertension    Type 2 diabetes mellitus    Acute left-sided thoracic back pain    Hyperlipidemia    Elevated d-dimer    Pulmonary emphysema       Diagnostic Studies:   Latest Reference Range & Units 01/15/24 05:23   WBC 3.90 - 12.70 K/uL 10.59   RBC 4.60 - 6.20 M/uL 5.29   Hemoglobin 14.0 - 18.0 g/dL 15.1   Hematocrit 40.0 - 54.0 % 47.2   MCV 82 - 98 fL 89   MCH 27.0 - 31.0 pg 28.5   MCHC 32.0 - 36.0 g/dL 32.0   RDW 11.5 - 14.5 % 13.2   Platelet Count 150 - 450 K/uL 201   MPV 9.2 - 12.9 fL 10.3   Gran % 38.0 - 73.0 % 79.9 (H)   Lymph % 18.0 - 48.0 % 15.5 (L)   Mono % 4.0 - 15.0 % 4.1   Eosinophil % 0.0 - 8.0 % 0.0   Basophil % 0.0 - 1.9 % 0.3   Immature Granulocytes 0.0 - 0.5 % 0.2   Gran # (ANC) 1.8 - 7.7 K/uL 8.5 (H)   Lymph # 1.0 - 4.8 K/uL 1.6   Mono # 0.3 - 1.0 K/uL 0.4   Eos # 0.0 - 0.5 K/uL 0.0   Baso # 0.00 - 0.20 K/uL 0.03   Immature Grans (Abs) 0.00 - 0.04 K/uL 0.02   nRBC 0 /100 WBC 0   Differential Method  Automated      Latest Reference Range & Units 01/15/24 05:23   Sodium 136 - 145 mmol/L 135 (L)   Potassium 3.5 - 5.1 mmol/L 4.1   Chloride 95 - 110 mmol/L 99   CO2 23 - 29 mmol/L 25   Anion Gap 8 - 16 mmol/L 11   BUN 6 - 20 mg/dL 27 (H)   Creatinine 0.5 - 1.4 mg/dL 0.8   eGFR >60 mL/min/1.73 m^2 >60   Glucose 70 - 110 mg/dL 160 (H)   Calcium 8.7 - 10.5 mg/dL 8.7   Phosphorus Level 2.7 - 4.5 mg/dL 2.8   Magnesium  1.6 - 2.6 mg/dL 2.0   ALP 55 - 135 U/L 36 (L)   PROTEIN TOTAL 6.0 - 8.4 g/dL 7.3   Albumin 3.5 - 5.2 g/dL 3.2 (L)   BILIRUBIN TOTAL 0.1 - 1.0 mg/dL 1.9 (H)   AST 10 - 40 U/L 23   ALT 10 - 44 U/L 35     EKG (1/14/24):  NSR    TTE (1/12/24):    Left Ventricle: The left ventricle is normal in size. Normal wall thickness. Normal wall motion. There is normal systolic function with a visually estimated ejection fraction of 55 - 60%. Grade I diastolic  dysfunction.    Right Ventricle: Normal right ventricular cavity size. Systolic function is normal.    Left Atrium: Left atrium is moderately dilated.    Aortic Valve: The aortic valve is a trileaflet valve.    Mitral Valve: There is mild regurgitation.    Tricuspid Valve: There is mild regurgitation.    Pulmonary Artery: The estimated pulmonary artery systolic pressure is 15 mmHg.    IVC/SVC: Normal venous pressure at 3 mmHg.    24 Hour Vitals:  Temp:  [36.3 °C (97.4 °F)-39.3 °C (102.7 °F)] 37.6 °C (99.7 °F)  Pulse:  [] 119  Resp:  [13-32] 20  SpO2:  [88 %-97 %] 94 %  BP: ()/(55-75) 98/55   See Nursing Charting For Additional Vitals      Pre-op Assessment    I have reviewed the Patient Summary Reports.     I have reviewed the Nursing Notes.       Review of Systems  Anesthesia Hx:  No problems with previous Anesthesia               Denies Personal Hx of Anesthesia complications.                    Social:  Former Smoker, No Alcohol Use       Cardiovascular:  Exercise tolerance: good   Hypertension           hyperlipidemia   ECG has been reviewed.                          Pulmonary:   COPD                     Hepatic/GI:        Emphysematous cholecystitis          Neurological:  Neurology Normal                                      Endocrine:  Diabetes               Physical Exam  General: Well nourished and Cooperative    Airway:  Mallampati: III   Mouth Opening: Normal  TM Distance: Normal    Dental:  Dentures    Chest/Lungs:  Clear to auscultation, Normal Respiratory Rate    Heart:  Rate: Tachycardia  Rhythm: Regular Rhythm        Anesthesia Plan  Type of Anesthesia, risks & benefits discussed:    Anesthesia Type: Gen ETT  Intra-op Monitoring Plan: Standard ASA Monitors  Post Op Pain Control Plan: multimodal analgesia and IV/PO Opioids PRN  Induction:  IV and rapid sequence  Airway Plan: Direct and Video, Post-Induction  Informed Consent: Informed consent signed with the Patient and all parties  understand the risks and agree with anesthesia plan.  All questions answered. Patient consented to blood products? Yes  ASA Score: 3 Emergent  Anesthesia Plan Notes:   GA with OETT, RSI  Standard ASA monitors  Recovery in PACU  PONV: 2    Ready For Surgery From Anesthesia Perspective.     .

## 2024-01-15 NOTE — PT/OT/SLP PROGRESS
Occupational Therapy      Patient Name:  Malou Suarez   MRN:  [id number]    Patient not seen today secondary to elevated HR . Will follow-up tomorrow.    1/15/2024

## 2024-01-15 NOTE — SUBJECTIVE & OBJECTIVE
Past Medical History:   Diagnosis Date    Diabetes mellitus     Pulmonary emphysema 2024       No past surgical history on file.    Review of patient's allergies indicates:  No Known Allergies    No current facility-administered medications on file prior to encounter.     Current Outpatient Medications on File Prior to Encounter   Medication Sig    alogliptin-pioglitazone 12.5-15 mg Tab Take by mouth once daily.    aspirin (ECOTRIN) 81 MG EC tablet Take 1 tablet (81 mg total) by mouth once daily.    atorvastatin (LIPITOR) 40 MG tablet Take 1 tablet (40 mg total) by mouth once daily.    BASAGLAR KWIKPEN U-100 INSULIN glargine 100 units/mL SubQ pen SMARTSI Unit(s) SUB-Q Every Night    empagliflozin 10 mg Tab Take 10 mg by mouth once daily.    ergocalciferol (ERGOCALCIFEROL) 50,000 unit Cap Take 50,000 Units by mouth every 7 days.    insulin aspart protamine-insulin aspart (NOVOLOG 70/30) 100 unit/mL (70-30) InPn pen Inject into the skin 2 (two) times daily before meals.    insulin glargine (LANTUS) 100 unit/mL injection Inject into the skin every evening.    JANUMET -1,000 mg TM24 Take 1 tablet by mouth every morning.    losartan (COZAAR) 25 MG tablet Take 25 mg by mouth.    traMADoL (ULTRAM) 50 mg tablet Take 1 tablet (50 mg total) by mouth every 8 (eight) hours as needed for Pain.    VEMLIDY 25 mg Tab Take 1 tablet by mouth.     Family History    None       Tobacco Use    Smoking status: Former    Smokeless tobacco: Not on file   Substance and Sexual Activity    Alcohol use: No    Drug use: No    Sexual activity: Not on file     Review of Systems   Constitutional:  Positive for appetite change.   HENT: Negative.     Respiratory: Negative.     Cardiovascular:  Positive for chest pain.        Left-sided squeezing/sharp chest pain   Gastrointestinal:  Positive for abdominal pain.   Genitourinary: Negative.    Musculoskeletal:  Positive for back pain and myalgias.        Mostly on left thoracic spine    Neurological: Negative.    Psychiatric/Behavioral: Negative.       Objective:     Vital Signs (Most Recent):  Temp: 99.7 °F (37.6 °C) (01/15/24 1057)  Pulse: (!) 119 (01/15/24 1057)  Resp: 13 (01/15/24 1057)  BP: (!) 98/55 (01/15/24 1057)  SpO2: (!) 94 % (01/15/24 1057) Vital Signs (24h Range):  Temp:  [97.4 °F (36.3 °C)-102.7 °F (39.3 °C)] 99.7 °F (37.6 °C)  Pulse:  [] 119  Resp:  [13-32] 13  SpO2:  [88 %-97 %] 94 %  BP: ()/(55-75) 98/55     Weight: 70.6 kg (155 lb 10.3 oz)  Body mass index is 25.9 kg/m².     Physical Exam  Constitutional:       General: He is in acute distress.      Appearance: Normal appearance.      Comments: Patient visibly in pain, moving around in the bed    HENT:      Head: Normocephalic and atraumatic.      Mouth/Throat:      Mouth: Mucous membranes are moist.   Eyes:      Extraocular Movements: Extraocular movements intact.   Cardiovascular:      Rate and Rhythm: Regular rhythm. Bradycardia present.      Pulses: Normal pulses.      Comments: EKG shows sinus bradycardia, no STEMI at 58 bpm  Pulmonary:      Effort: Pulmonary effort is normal.   Abdominal:      General: Abdomen is flat.      Tenderness: There is abdominal tenderness. There is no right CVA tenderness, left CVA tenderness or guarding.      Comments: Left upper quadrant.  Negative Velasquez's sign   Musculoskeletal:      Right lower leg: No edema.      Left lower leg: No edema.      Comments: Left-sided chest pain that radiates to the left paraspinal musculature of the thoracic spine. + TTP, no erythema, no ecchymosis   Skin:     General: Skin is warm.   Neurological:      General: No focal deficit present.      Mental Status: He is alert and oriented to person, place, and time. Mental status is at baseline.   Psychiatric:         Mood and Affect: Mood normal.         Behavior: Behavior normal.         Thought Content: Thought content normal.             Significant Labs: All pertinent labs within the past 24 hours  "have been reviewed.  Bilirubin:   Recent Labs   Lab 01/11/24  2300 01/13/24  0932 01/13/24  1809 01/14/24  0530 01/15/24  0523   BILITOT 0.4 0.8 0.6 1.1* 1.9*       BMP:   Recent Labs   Lab 01/15/24  0523   *   *   K 4.1   CL 99   CO2 25   BUN 27*   CREATININE 0.8   CALCIUM 8.7   MG 2.0       CBC:   Recent Labs   Lab 01/13/24  1809 01/14/24  0530 01/15/24  0523   WBC 11.18 11.69 10.59   HGB 14.7 13.5* 15.1   HCT 44.3 41.1 47.2    221 201       CMP:   Recent Labs   Lab 01/13/24  1809 01/14/24  0530 01/15/24  0523    138 135*   K 3.8 3.6 4.1    105 99   CO2 22* 22* 25   * 196* 160*   BUN 17 23* 27*   CREATININE 0.8 0.8 0.8   CALCIUM 8.7 8.4* 8.7   PROT 7.5 7.0 7.3   ALBUMIN 3.8 3.5 3.2*   BILITOT 0.6 1.1* 1.9*   ALKPHOS 41* 41* 36*   AST 15 19 23   ALT 40 35 35   ANIONGAP 11 11 11       Lipase:   Recent Labs   Lab 01/13/24  1809   LIPASE 33       Magnesium:   Recent Labs   Lab 01/14/24  0530 01/15/24  0523   MG 1.6 2.0       POCT Glucose:   Recent Labs   Lab 01/14/24  2006 01/15/24  0713 01/15/24  1056   POCTGLUCOSE 193* 170* 199*       Troponin:   Recent Labs   Lab 01/13/24  1809   TROPONINI <0.006       Urine Studies:   No results for input(s): "COLORU", "APPEARANCEUA", "PHUR", "SPECGRAV", "PROTEINUA", "GLUCUA", "KETONESU", "BILIRUBINUA", "OCCULTUA", "NITRITE", "UROBILINOGEN", "LEUKOCYTESUR", "RBCUA", "WBCUA", "BACTERIA", "SQUAMEPITHEL", "HYALINECASTS" in the last 48 hours.    Invalid input(s): "WRIGHTSUR"    Significant Imaging: I have reviewed all pertinent imaging results/findings within the past 24 hours.  Imaging Results              X-Ray Thoracic Spine AP Lateral (Final result)  Result time 01/14/24 10:17:53      Final result by Ruben Collado MD (01/14/24 10:17:53)                   Impression:      As above.      Electronically signed by: Ruben Collado MD  Date:    01/14/2024  Time:    10:17               Narrative:    EXAMINATION:  XR THORACIC SPINE AP " LATERAL    CLINICAL HISTORY:  back pain;    TECHNIQUE:  AP and lateral views of the thoracic spine.    COMPARISON:  None.    FINDINGS:  Alignment: Alignment is maintained.    Vertebrae: Vertebral body heights are maintained.  No suspicious appearing lytic or blastic lesions.    Discs and facets: Disc heights are maintained.  Multilevel marginal osteophytes noted.    Miscellaneous: No additional findings.                                       X-Ray Lumbar Spine Ap And Lateral (Final result)  Result time 01/14/24 10:19:51      Final result by Ruben Collado MD (01/14/24 10:19:51)                   Impression:      As above.      Electronically signed by: Ruben Collado MD  Date:    01/14/2024  Time:    10:19               Narrative:    EXAMINATION:  XR LUMBAR SPINE AP AND LATERAL    CLINICAL HISTORY:  back pain;    TECHNIQUE:  AP, lateral and spot images were performed of the lumbar spine.    COMPARISON:  None    FINDINGS:  Alignment: Minimal levoconvex curvature of the lumbar spine.  Anteroposterior alignment is maintained.    Vertebrae: Vertebral body heights are maintained.  No suspicious appearing lytic or blastic lesions.    Discs and facets: Disc heights are maintained.  Small marginal osteophytes noted.  There is lower lumbar facet arthropathy.    Miscellaneous: Aortic calcification noted.                                       CTA Chest Non-Coronary (PE Studies) (Final result)  Result time 01/13/24 20:26:03      Final result by Giovanni Mckinney MD (01/13/24 20:26:03)                   Impression:      1. No evidence of PE.  No acute intrathoracic abnormalities identified.  2. Mild to moderate pulmonary emphysema.      Electronically signed by: Giovanni Mckinney MD  Date:    01/13/2024  Time:    20:26               Narrative:    EXAMINATION:  CTA CHEST NON CORONARY (PE STUDIES)    CLINICAL HISTORY:  Pulmonary embolism (PE) suspected, positive D-dimer;    TECHNIQUE:  Low dose axial images, sagittal and coronal  reformations were obtained from the thoracic inlet to the lung bases following the IV administration of 75 mL of Omnipaque 350.  Contrast timing was optimized to evaluate the pulmonary arteries.  MIP images were performed.    COMPARISON:  None    FINDINGS:  Structures at the base of the neck are unremarkable.  Aorta is non-aneurysmal.  The heart is normal in size without pericardial effusion.  Mild coronary artery calcification is seen.  No intraluminal filling defects within the pulmonary arteries to suggest pulmonary thromboembolism.   There is no evidence of mediastinal, axillary, or hilar lymph node enlargement.  The esophagus is unremarkable along its course.    The trachea and bronchi are patent.  The lungs are symmetrically expanded.  Mild to moderate emphysematous changes are seen within the upper lobes.  Lungs show no consolidation, pleural effusion, pulmonary hemorrhage, or infarction.    The visualized abdominal structures show no acute abnormalities.  Multiple remote right rib fracture deformities are seen.  Extrathoracic soft tissues are unremarkable.

## 2024-01-15 NOTE — PT/OT/SLP EVAL
Physical Therapy Evaluation    Patient Name:  Malou Suarez   MRN:  [id number]    Recommendations:     Discharge Recommendations: Low Intensity Therapy   Discharge Equipment Recommendations: bedside commode (- patient reports not wanting/needing any equipment.)   Barriers to discharge:  Right Sided abdominal pain limiting function.     Assessment:     Malou Suarez is a 58 y.o. male admitted with a medical diagnosis of Left upper quadrant pain.  He presents with the following impairments/functional limitations: impaired endurance, impaired functional mobility, gait instability, impaired balance, decreased lower extremity function, decreased safety awareness, pain.    Rehab Prognosis: Good; patient would benefit from acute skilled PT services to address these deficits and reach maximum level of function.    Recent Surgery: * No surgery found *      Plan:     During this hospitalization, patient to be seen 4 x/week to address the identified rehab impairments via gait training, therapeutic activities, therapeutic exercises and progress toward the following goals:    Plan of Care Expires:  01/28/24    Subjective     Chief Complaint: increase pain (and HR) with functional task.    Patient/Family Comments/goals: to return to PLOF  Pain/Comfort:  Pain Rating 1: 10/10  Location - Side 1: Right  Location - Orientation 1: lateral  Location 1: abdomen  Pain Addressed 1: Cessation of Activity (- patient's HR jumped to 140's with ambulation.  NSG requested to limited activity and return to Bed.)    Patients cultural, spiritual, Worship conflicts given the current situation: no    Living Environment:  Patient lives with Spouse in a [facility] with 1 [address].    Prior to admission, patients level of function was Independent.  Equipment used at home: none.  DME owned (not currently used): none.  Upon discharge, patient will have assistance from Spouse and Self.    Objective:     Communicated with Nurse prior to session.  Patient found right  sidelying with peripheral IV, telemetry  upon PT entry to room.    General Precautions: Standard, fall  Orthopedic Precautions:Full weight bearing   Braces: N/A  Respiratory Status: Room air    Exams:  Cognitive Exam:  Patient is oriented to Person and Situation  Gross Motor Coordination:  WFL  Skin Integrity/Edema:      -       Skin integrity: Visible skin intact  RLE ROM: WFL  RLE Strength: WFL  LLE ROM: WFL  LLE Strength: WFL    Functional Mobility:  Bed Mobility:     Supine to Sit: minimum assistance  Sit to Supine: minimum assistance  Transfers:     Sit to Stand:  minimum assistance with hand-held assist  Gait: 12' around EOB with Min A.  NSG requested patient return to Bed due to HR in 140's with ambulation.    AM-PAC 6 CLICK MOBILITY  Total Score:17     Patient left right sidelying with all lines intact, call button in reach, and spouse and Nurse present.    GOALS:   Multidisciplinary Problems       Physical Therapy Goals          Problem: Physical Therapy    Goal Priority Disciplines Outcome Goal Variances Interventions   Physical Therapy Goal     PT, PT/OT Ongoing, Progressing     Description: Goals to be met by:  2024    Patient will increase functional independence with mobility by performin. Supine to sit with Modified [place]  2. Sit to supine with Modified [place]  3. Sit to stand transfer with Modified [place]  4. Gait  x 400 feet with Modified [place] using Rolling Walker.    Recommend: Low Intensity Therapy at time of discharge to home with family.                             History:     Past Medical History:   Diagnosis Date    Diabetes mellitus     Pulmonary emphysema 2024       No past surgical history on file.    Time Tracking:     PT Received On: 01/15/24  PT Start Time: 0900     PT Stop Time: 0930  PT Total Time (min): 30 min     Billable Minutes: Evaluation 20 min      01/15/2024

## 2024-01-15 NOTE — CARE UPDATE
General Surgery Care Update     Now s/p laparoscopic cholecystectomy. Gallbladder was severely inflamed with signs of necrosis. Drain left in operative bed. Continue zosyn. Received 2L fluid intraoperatively. Maintenance LR added. Please reach out with any questions or concerns.     Ari Vu MD   General Surgery, PGY-3   
General Surgery Care Update    Patient with worsening pain. CT scan obtained showing air filled gallbladder concerning for emphysematous cholecystitis. Pt will go to OR emergently. Consent obtained with aid of video .    Ari Vu MD   General Surgery, PGY-3  
.

## 2024-01-15 NOTE — HOSPITAL COURSE
Malou Suarez is a 58 y.o. with a pmh of DM and HTN presents to the hospital with complaints of left sided chest pain with radiation to the back on the left. Patient was recently admitted with left upper quadrant pain associated with n/v and seen by both Surgery and Cardiology. Surgery cleared patient as HIDA scan showed no acute cholecystitis.  Cardiology also cleared patient for atypical chest pain with follow-up stress test outpatient. He was discharged on 01/13/2024 from the hospital and returned the same day to the ED similar complaints.   In the ED: Patient is afebrile without leukocytosis, hypertensive on presentation 189/75, 98 % O2 sat on RA, CBC unremarkable, D-dimer 0.5, magnesium 1.5, ALP 41, troponin normal, POCT glucose 250, CTA chest (1) no evidence of PE no acute intrathoracic abnormality 2) mild to moderate pulmonary emphysema, EKG shows sinus bradycardia, no STEMI at 58 bpm. Patient given cyclobenzaprine 10 mg, ketorolac 15 mg IV, morphine 4 mg IV x2 in the ED.  CTA show,Gallbladder markedly thickened and enlarged, with substantially worsened inflammatory change when compared to the 01/12/2024 CT. Redemonstrated cholelithiasis, with notable stone in the cystic duct unchanged in position. Extensive intraluminal air now noted throughout the gallbladder lumen, possibly on the basis of emphysematous cholecystitis, abscess formation, and/or fistulization with adjoining gastrointestinal tract. Question few small foci of extraluminal air lateral aspect of the gallbladder, which could indicate perforation. Additionally, lobulated focus of increased attenuation measuring the order of 22 mm in this region could indicate hemorrhage.   S/P lap. Ananya on 1.15.24,advancing diet gradually,PT,OT,remain son IV Zosyn,  PT,OT is consulted.  Symptoms of patient is improved,at DC time pain was controlled,was tolerating diet.sara was discharged home with HH and pain medications and follow up with PCPa nd surgery as out  patient.

## 2024-01-16 LAB
ALBUMIN SERPL BCP-MCNC: 2.3 G/DL (ref 3.5–5.2)
ALP SERPL-CCNC: 28 U/L (ref 55–135)
ALT SERPL W/O P-5'-P-CCNC: 89 U/L (ref 10–44)
ANION GAP SERPL CALC-SCNC: 7 MMOL/L (ref 8–16)
AST SERPL-CCNC: 86 U/L (ref 10–40)
BASOPHILS # BLD AUTO: 0.01 K/UL (ref 0–0.2)
BASOPHILS # BLD AUTO: ABNORMAL K/UL (ref 0–0.2)
BASOPHILS NFR BLD: 0 % (ref 0–1.9)
BASOPHILS NFR BLD: 0.1 % (ref 0–1.9)
BILIRUB SERPL-MCNC: 1.5 MG/DL (ref 0.1–1)
BUN SERPL-MCNC: 23 MG/DL (ref 6–20)
CALCIUM SERPL-MCNC: 8.4 MG/DL (ref 8.7–10.5)
CHLORIDE SERPL-SCNC: 103 MMOL/L (ref 95–110)
CO2 SERPL-SCNC: 26 MMOL/L (ref 23–29)
CREAT SERPL-MCNC: 0.7 MG/DL (ref 0.5–1.4)
DIFFERENTIAL METHOD BLD: ABNORMAL
DIFFERENTIAL METHOD BLD: ABNORMAL
EOSINOPHIL # BLD AUTO: 0 K/UL (ref 0–0.5)
EOSINOPHIL # BLD AUTO: ABNORMAL K/UL (ref 0–0.5)
EOSINOPHIL NFR BLD: 0 % (ref 0–8)
EOSINOPHIL NFR BLD: 0 % (ref 0–8)
ERYTHROCYTE [DISTWIDTH] IN BLOOD BY AUTOMATED COUNT: 13.1 % (ref 11.5–14.5)
ERYTHROCYTE [DISTWIDTH] IN BLOOD BY AUTOMATED COUNT: 13.2 % (ref 11.5–14.5)
EST. GFR  (NO RACE VARIABLE): >60 ML/MIN/1.73 M^2
GLUCOSE SERPL-MCNC: 213 MG/DL (ref 70–110)
HCT VFR BLD AUTO: 36 % (ref 40–54)
HCT VFR BLD AUTO: 38.3 % (ref 40–54)
HGB BLD-MCNC: 12 G/DL (ref 14–18)
HGB BLD-MCNC: 12.7 G/DL (ref 14–18)
IMM GRANULOCYTES # BLD AUTO: 0.03 K/UL (ref 0–0.04)
IMM GRANULOCYTES # BLD AUTO: ABNORMAL K/UL (ref 0–0.04)
IMM GRANULOCYTES NFR BLD AUTO: 0.4 % (ref 0–0.5)
IMM GRANULOCYTES NFR BLD AUTO: ABNORMAL % (ref 0–0.5)
LYMPHOCYTES # BLD AUTO: 0.7 K/UL (ref 1–4.8)
LYMPHOCYTES # BLD AUTO: ABNORMAL K/UL (ref 1–4.8)
LYMPHOCYTES NFR BLD: 13 % (ref 18–48)
LYMPHOCYTES NFR BLD: 9.4 % (ref 18–48)
MAGNESIUM SERPL-MCNC: 2.1 MG/DL (ref 1.6–2.6)
MCH RBC QN AUTO: 28.9 PG (ref 27–31)
MCH RBC QN AUTO: 29.1 PG (ref 27–31)
MCHC RBC AUTO-ENTMCNC: 33.2 G/DL (ref 32–36)
MCHC RBC AUTO-ENTMCNC: 33.3 G/DL (ref 32–36)
MCV RBC AUTO: 87 FL (ref 82–98)
MCV RBC AUTO: 88 FL (ref 82–98)
MONOCYTES # BLD AUTO: 0.3 K/UL (ref 0.3–1)
MONOCYTES # BLD AUTO: ABNORMAL K/UL (ref 0.3–1)
MONOCYTES NFR BLD: 4.5 % (ref 4–15)
MONOCYTES NFR BLD: 8 % (ref 4–15)
NEUTROPHILS # BLD AUTO: 6.3 K/UL (ref 1.8–7.7)
NEUTROPHILS NFR BLD: 53 % (ref 38–73)
NEUTROPHILS NFR BLD: 85.6 % (ref 38–73)
NEUTS BAND NFR BLD MANUAL: 26 %
NRBC BLD-RTO: 0 /100 WBC
NRBC BLD-RTO: 0 /100 WBC
PHOSPHATE SERPL-MCNC: 2.1 MG/DL (ref 2.7–4.5)
PLATELET # BLD AUTO: 172 K/UL (ref 150–450)
PLATELET # BLD AUTO: 187 K/UL (ref 150–450)
PMV BLD AUTO: 10.4 FL (ref 9.2–12.9)
PMV BLD AUTO: 10.5 FL (ref 9.2–12.9)
POCT GLUCOSE: 186 MG/DL (ref 70–110)
POCT GLUCOSE: 222 MG/DL (ref 70–110)
POCT GLUCOSE: 223 MG/DL (ref 70–110)
POCT GLUCOSE: 227 MG/DL (ref 70–110)
POTASSIUM SERPL-SCNC: 4.7 MMOL/L (ref 3.5–5.1)
PROT SERPL-MCNC: 6 G/DL (ref 6–8.4)
RBC # BLD AUTO: 4.15 M/UL (ref 4.6–6.2)
RBC # BLD AUTO: 4.36 M/UL (ref 4.6–6.2)
SODIUM SERPL-SCNC: 136 MMOL/L (ref 136–145)
WBC # BLD AUTO: 7.32 K/UL (ref 3.9–12.7)
WBC # BLD AUTO: 8.11 K/UL (ref 3.9–12.7)

## 2024-01-16 PROCEDURE — 97116 GAIT TRAINING THERAPY: CPT | Mod: CQ

## 2024-01-16 PROCEDURE — 63600175 PHARM REV CODE 636 W HCPCS

## 2024-01-16 PROCEDURE — 84100 ASSAY OF PHOSPHORUS: CPT

## 2024-01-16 PROCEDURE — 63600175 PHARM REV CODE 636 W HCPCS: Performed by: HOSPITALIST

## 2024-01-16 PROCEDURE — 25000003 PHARM REV CODE 250

## 2024-01-16 PROCEDURE — 97535 SELF CARE MNGMENT TRAINING: CPT

## 2024-01-16 PROCEDURE — 36415 COLL VENOUS BLD VENIPUNCTURE: CPT

## 2024-01-16 PROCEDURE — 80053 COMPREHEN METABOLIC PANEL: CPT

## 2024-01-16 PROCEDURE — 25000003 PHARM REV CODE 250: Performed by: HOSPITALIST

## 2024-01-16 PROCEDURE — 85025 COMPLETE CBC W/AUTO DIFF WBC: CPT

## 2024-01-16 PROCEDURE — 83735 ASSAY OF MAGNESIUM: CPT

## 2024-01-16 PROCEDURE — 96361 HYDRATE IV INFUSION ADD-ON: CPT

## 2024-01-16 PROCEDURE — 96366 THER/PROPH/DIAG IV INF ADDON: CPT

## 2024-01-16 PROCEDURE — 85027 COMPLETE CBC AUTOMATED: CPT

## 2024-01-16 PROCEDURE — 11000001 HC ACUTE MED/SURG PRIVATE ROOM

## 2024-01-16 PROCEDURE — 97530 THERAPEUTIC ACTIVITIES: CPT

## 2024-01-16 PROCEDURE — 94761 N-INVAS EAR/PLS OXIMETRY MLT: CPT

## 2024-01-16 PROCEDURE — 85007 BL SMEAR W/DIFF WBC COUNT: CPT

## 2024-01-16 PROCEDURE — 27000221 HC OXYGEN, UP TO 24 HOURS

## 2024-01-16 RX ORDER — LOPERAMIDE HYDROCHLORIDE 2 MG/1
2 CAPSULE ORAL 4 TIMES DAILY PRN
Status: DISCONTINUED | OUTPATIENT
Start: 2024-01-16 | End: 2024-01-18 | Stop reason: HOSPADM

## 2024-01-16 RX ADMIN — OXYCODONE HYDROCHLORIDE 10 MG: 5 TABLET ORAL at 11:01

## 2024-01-16 RX ADMIN — LOPERAMIDE HYDROCHLORIDE 2 MG: 2 CAPSULE ORAL at 10:01

## 2024-01-16 RX ADMIN — INSULIN ASPART 2 UNITS: 100 INJECTION, SOLUTION INTRAVENOUS; SUBCUTANEOUS at 08:01

## 2024-01-16 RX ADMIN — SODIUM CHLORIDE, POTASSIUM CHLORIDE, SODIUM LACTATE AND CALCIUM CHLORIDE: 600; 310; 30; 20 INJECTION, SOLUTION INTRAVENOUS at 09:01

## 2024-01-16 RX ADMIN — ACETAMINOPHEN 1000 MG: 1000 INJECTION INTRAVENOUS at 02:01

## 2024-01-16 RX ADMIN — PIPERACILLIN AND TAZOBACTAM 4.5 G: 4; .5 INJECTION, POWDER, LYOPHILIZED, FOR SOLUTION INTRAVENOUS; PARENTERAL at 07:01

## 2024-01-16 RX ADMIN — INSULIN DETEMIR 10 UNITS: 100 INJECTION, SOLUTION SUBCUTANEOUS at 08:01

## 2024-01-16 RX ADMIN — PIPERACILLIN AND TAZOBACTAM 4.5 G: 4; .5 INJECTION, POWDER, LYOPHILIZED, FOR SOLUTION INTRAVENOUS; PARENTERAL at 10:01

## 2024-01-16 RX ADMIN — ACETAMINOPHEN 1000 MG: 1000 INJECTION INTRAVENOUS at 05:01

## 2024-01-16 RX ADMIN — GABAPENTIN 300 MG: 300 CAPSULE ORAL at 02:01

## 2024-01-16 RX ADMIN — GABAPENTIN 300 MG: 300 CAPSULE ORAL at 08:01

## 2024-01-16 RX ADMIN — ACETAMINOPHEN 1000 MG: 500 TABLET ORAL at 09:01

## 2024-01-16 RX ADMIN — INSULIN ASPART 4 UNITS: 100 INJECTION, SOLUTION INTRAVENOUS; SUBCUTANEOUS at 11:01

## 2024-01-16 RX ADMIN — SODIUM CHLORIDE, POTASSIUM CHLORIDE, SODIUM LACTATE AND CALCIUM CHLORIDE: 600; 310; 30; 20 INJECTION, SOLUTION INTRAVENOUS at 06:01

## 2024-01-16 RX ADMIN — LOPERAMIDE HYDROCHLORIDE 2 MG: 2 CAPSULE ORAL at 04:01

## 2024-01-16 RX ADMIN — ASPIRIN 81 MG: 81 TABLET, COATED ORAL at 08:01

## 2024-01-16 RX ADMIN — PIPERACILLIN AND TAZOBACTAM 4.5 G: 4; .5 INJECTION, POWDER, LYOPHILIZED, FOR SOLUTION INTRAVENOUS; PARENTERAL at 03:01

## 2024-01-16 RX ADMIN — ATORVASTATIN CALCIUM 40 MG: 40 TABLET, FILM COATED ORAL at 08:01

## 2024-01-16 RX ADMIN — OXYCODONE HYDROCHLORIDE 10 MG: 5 TABLET ORAL at 06:01

## 2024-01-16 NOTE — NURSING
Received report from off going nurse, Shannon. Pt received lying in bed AAO X 3. Resp even and unlabored on O2 2l/nc. Pt wearing tele box # [id number] and it is visible on the monitor. RAC 20g and RW 22g noted. Pt states that his pain is 2/10.

## 2024-01-16 NOTE — PLAN OF CARE
Problem: Occupational Therapy  Goal: Occupational Therapy Goal  Description: Goals to be met by: 01/21/24     Patient will increase functional independence with ADLs by performing:    UE Dressing with [place].  LE Dressing with Set-up Assistance.  Grooming while seated at sink with Set-up Assistance.  Toileting from bedside commode with Set-up Assistance for hygiene and clothing management.   Sitting at edge of bed x 30-60  minutes with Supervision.  Toilet transfer to bedside commode with Supervision.  Upper extremity exercise program x10  reps per handout, with supervision.    Outcome: Ongoing, Progressing   Patient t/f to bathroom from bed with occupational therapy and PTA with CGA to supervision due to IV, ANDREEA drain, and for safety.

## 2024-01-16 NOTE — PT/OT/SLP PROGRESS
Occupational Therapy      Patient Name:  Malou Suarez   MRN:  [id number]    Patient not seen today secondary to awaiting neurosurgery consult . Will follow-up when medically able.    1/16/2024

## 2024-01-16 NOTE — NURSING
Report handoff given to Efraín Medrano RN     Pt resting in bed quietly. NAD noted.     Fall and safety precautions maintained. Bed locked in lowest position, with side rails up x2. Call bell and personal items within reach.  Family at bedside.

## 2024-01-16 NOTE — SUBJECTIVE & OBJECTIVE
Interval History:  Postop day 1 from laparoscopic cholecystectomy.  Significant signs of inflammation and necrosis noted.  Doing well today.  Pain is controlled.  Labs are normalizing.    Medications:  Continuous Infusions:   lactated ringers 110 mL/hr at 01/15/24 2318     Scheduled Meds:   acetaminophen  1,000 mg Intravenous Q8H    Followed by    acetaminophen  1,000 mg Oral Q8H    aspirin  81 mg Oral Daily    atorvastatin  40 mg Oral Daily    gabapentin  300 mg Oral TID    insulin detemir U-100  10 Units Subcutaneous QHS    losartan  25 mg Oral Daily    piperacillin-tazobactam (Zosyn) IV (PEDS and ADULTS) (extended infusion is not appropriate)  4.5 g Intravenous Q8H    polyethylene glycol  17 g Oral BID     PRN Meds:bisacodyL, dextrose 10%, dextrose 10%, dextrose 10%, dextrose 10%, glucagon (human recombinant), glucose, glucose, HYDROmorphone, HYDROmorphone, insulin aspart U-100, melatonin, naloxone, ondansetron, ondansetron, oxyCODONE, oxyCODONE, polyethylene glycol, prochlorperazine, prochlorperazine, sodium chloride 0.9%, sodium chloride 0.9%     Review of patient's allergies indicates:  No Known Allergies  Objective:     Vital Signs (Most Recent):  Temp: 98.5 °F (36.9 °C) (01/16/24 0717)  Pulse: 66 (01/16/24 0717)  Resp: 18 (01/16/24 0717)  BP: (!) 107/55 (01/16/24 0717)  SpO2: 96 % (01/16/24 0717) Vital Signs (24h Range):  Temp:  [97.4 °F (36.3 °C)-99.7 °F (37.6 °C)] 98.5 °F (36.9 °C)  Pulse:  [] 66  Resp:  [13-22] 18  SpO2:  [93 %-99 %] 96 %  BP: ()/(54-66) 107/55     Weight: 70.6 kg (155 lb 10.3 oz)  Body mass index is 25.9 kg/m².    Intake/Output - Last 3 Shifts         01/14 0700  01/15 0659 01/15 0700  01/16 0659 01/16 0700  01/17 0659    P.O. 0 120     I.V. (mL/kg)  1056.6 (15)     IV Piggyback  2168.2     Total Intake(mL/kg) 0 (0) 3344.7 (47.4)     Urine (mL/kg/hr)  1155 (0.7) 300 (4.2)    Emesis/NG output  0     Drains  90     Other  0     Stool  0     Blood  250     Total Output  1495 300     Net 0 +1849.7 -300           Urine Occurrence 3 x 0 x     Stool Occurrence 2 x 0 x     Emesis Occurrence  0 x              Physical Exam  Constitutional:       General: He is not in acute distress.     Appearance: Normal appearance.   HENT:      Head: Normocephalic and atraumatic.      Mouth/Throat:      Mouth: Mucous membranes are moist.   Eyes:      Extraocular Movements: Extraocular movements intact.   Cardiovascular:      Rate and Rhythm: Normal rate and regular rhythm.      Pulses: Normal pulses.   Pulmonary:      Effort: Pulmonary effort is normal.   Abdominal:      General: Abdomen is flat.      Tenderness: There is abdominal tenderness. There is no right CVA tenderness, left CVA tenderness or guarding.      Comments: No significant tenderness to palpation.  Incisions with clean dry dressings intact.  No distention, guarding, or rebound.  Drain with serosanguineous output.     Musculoskeletal:      Right lower leg: No edema.      Left lower leg: No edema.   Skin:     General: Skin is warm.   Neurological:      General: No focal deficit present.      Mental Status: He is alert and oriented to person, place, and time. Mental status is at baseline.   Psychiatric:         Mood and Affect: Mood normal.         Behavior: Behavior normal.         Thought Content: Thought content normal.          Significant Labs:  I have reviewed all pertinent lab results within the past 24 hours.  CBC:   Recent Labs   Lab 01/16/24  0333   WBC 7.32   RBC 4.36*   HGB 12.7*   HCT 38.3*      MCV 88   MCH 29.1   MCHC 33.2     CMP:   Recent Labs   Lab 01/16/24  0333   *   CALCIUM 8.4*   ALBUMIN 2.3*   PROT 6.0      K 4.7   CO2 26      BUN 23*   CREATININE 0.7   ALKPHOS 28*   ALT 89*   AST 86*   BILITOT 1.5*       Significant Diagnostics:  I have reviewed all pertinent imaging results/findings within the past 24 hours.

## 2024-01-16 NOTE — CONSULTS
Neurosurgery consulted for questionable L1 fracture which was partially visualized on thoracic CT. CT abd/pelvis also reviewed and showed full visualization of L1. Questionable fracture lines from thoracic CT appear more consistent with prominent vasculature and show no evidence of obvious fracture on CT Abd/pelvis.     No neurosurgical intervention indicated at this time. Neurosurgery will sign off.     If patient complains of significant low back pain, could consider lumbar MRI and re-consult neurosurgery if positive.     Case discussed with Dr. Lanre Wick, PAPARMJIT  [facility]  Department of Neurosurgery  [phone]

## 2024-01-16 NOTE — PROGRESS NOTES
[facility] Medicine  Progress Note    Patient Name: Malou Suarez  MRN: [id number]  Patient Class: IP- Inpatient   Admission Date: 1/13/2024  Length of Stay: 0 days  Attending Physician: Haresh De Oliveira, *  Primary Care Provider: Fred Dia MD        Subjective:     Principal Problem:Left upper quadrant pain        HPI:  Malou Suarez is a 58 y.o. with a pmh of DM and HTN presents to the hospital with complaints of left sided chest pain with radiation to the back on the left. Patient was recently admitted with left upper quadrant pain associated with n/v and seen by both Surgery and Cardiology. Surgery cleared patient as HIDA scan showed no acute cholecystitis.  Cardiology also cleared patient for atypical chest pain with follow-up stress test outpatient. He was discharged on 01/13/2024 from the hospital and returned the same day to the ED similar complaints. He states that he was pain-free upon discharge but pain returned at home which prompted his return.  He describes pain as squeezing and sharp on the left side of his chest which radiates to his left back mostly in the thoracic region. Patient states that he is unable to tolerate anything by mouth due to his pain. Patient took the pain medicine given to him on discharge without much relief. He denies any alleviating or exacerbating factors. Patient denies headache, fever, blurry vision, nausea, vomiting, diarrhea, melena, hematemesis, hematochezia, hematuria, or any other associated symptoms. Of note he states that he did not take his blood pressure and diabetes medications prior to arrival.     service was used due to language barrier.  ID# [id number]    In the ED: Patient is afebrile without leukocytosis, hypertensive on presentation 189/75, 98 % O2 sat on RA, CBC unremarkable, D-dimer 0.5, magnesium 1.5, ALP 41, troponin normal, POCT glucose 250, CTA chest (1) no evidence of PE no acute intrathoracic abnormality 2) mild  to moderate pulmonary emphysema, EKG shows sinus bradycardia, no STEMI at 58 bpm. Patient given cyclobenzaprine 10 mg, ketorolac 15 mg IV, morphine 4 mg IV x2 in the ED.    Case discussed with ED provider.    Malou Suarez we will be placed under observation for further medical management of his intractable pain.    Overview/Hospital Course:  Malou Suarez is a 58 y.o. with a pmh of DM and HTN presents to the hospital with complaints of left sided chest pain with radiation to the back on the left. Patient was recently admitted with left upper quadrant pain associated with n/v and seen by both Surgery and Cardiology. Surgery cleared patient as HIDA scan showed no acute cholecystitis.  Cardiology also cleared patient for atypical chest pain with follow-up stress test outpatient. He was discharged on 01/13/2024 from the hospital and returned the same day to the ED similar complaints.   In the ED: Patient is afebrile without leukocytosis, hypertensive on presentation 189/75, 98 % O2 sat on RA, CBC unremarkable, D-dimer 0.5, magnesium 1.5, ALP 41, troponin normal, POCT glucose 250, CTA chest (1) no evidence of PE no acute intrathoracic abnormality 2) mild to moderate pulmonary emphysema, EKG shows sinus bradycardia, no STEMI at 58 bpm. Patient given cyclobenzaprine 10 mg, ketorolac 15 mg IV, morphine 4 mg IV x2 in the ED.  CTA show,Gallbladder markedly thickened and enlarged, with substantially worsened inflammatory change when compared to the 01/12/2024 CT. Redemonstrated cholelithiasis, with notable stone in the cystic duct unchanged in position. Extensive intraluminal air now noted throughout the gallbladder lumen, possibly on the basis of emphysematous cholecystitis, abscess formation, and/or fistulization with adjoining gastrointestinal tract. Question few small foci of extraluminal air lateral aspect of the gallbladder, which could indicate perforation. Additionally, lobulated focus of increased attenuation measuring the  order of 22 mm in this region could indicate hemorrhage.   S/P lap. Ananya on 1.15.24,advancing diet gradually,PT,OT,remain son SALOME Pickett,      Past Medical History:   Diagnosis Date    Diabetes mellitus     Pulmonary emphysema 2024       History reviewed. No pertinent surgical history.    Review of patient's allergies indicates:  No Known Allergies    No current facility-administered medications on file prior to encounter.     Current Outpatient Medications on File Prior to Encounter   Medication Sig    alogliptin-pioglitazone 12.5-15 mg Tab Take by mouth once daily.    aspirin (ECOTRIN) 81 MG EC tablet Take 1 tablet (81 mg total) by mouth once daily.    atorvastatin (LIPITOR) 40 MG tablet Take 1 tablet (40 mg total) by mouth once daily.    BASAGLAR KWIKPEN U-100 INSULIN glargine 100 units/mL SubQ pen SMARTSI Unit(s) SUB-Q Every Night    empagliflozin 10 mg Tab Take 10 mg by mouth once daily.    ergocalciferol (ERGOCALCIFEROL) 50,000 unit Cap Take 50,000 Units by mouth every 7 days.    insulin aspart protamine-insulin aspart (NOVOLOG 70/30) 100 unit/mL (70-30) InPn pen Inject into the skin 2 (two) times daily before meals.    insulin glargine (LANTUS) 100 unit/mL injection Inject into the skin every evening.    JANUMET -1,000 mg TM24 Take 1 tablet by mouth every morning.    losartan (COZAAR) 25 MG tablet Take 25 mg by mouth.    traMADoL (ULTRAM) 50 mg tablet Take 1 tablet (50 mg total) by mouth every 8 (eight) hours as needed for Pain.    VEMLIDY 25 mg Tab Take 1 tablet by mouth.     Family History    None       Tobacco Use    Smoking status: Former    Smokeless tobacco: Not on file   Substance and Sexual Activity    Alcohol use: No    Drug use: No    Sexual activity: Not on file     Review of Systems   Constitutional:  Positive for appetite change.   HENT: Negative.     Respiratory: Negative.     Cardiovascular:  Positive for chest pain.        Left-sided squeezing/sharp chest pain    Gastrointestinal:  Positive for abdominal pain.   Genitourinary: Negative.    Musculoskeletal:  Positive for back pain and myalgias.        Mostly on left thoracic spine   Neurological: Negative.    Psychiatric/Behavioral: Negative.       Objective:     Vital Signs (Most Recent):  Temp: 98.5 °F (36.9 °C) (01/16/24 0717)  Pulse: 66 (01/16/24 0717)  Resp: 18 (01/16/24 0717)  BP: (!) 107/55 (01/16/24 0717)  SpO2: 96 % (01/16/24 0717) Vital Signs (24h Range):  Temp:  [97.4 °F (36.3 °C)-99.7 °F (37.6 °C)] 98.5 °F (36.9 °C)  Pulse:  [] 66  Resp:  [13-22] 18  SpO2:  [93 %-99 %] 96 %  BP: ()/(54-66) 107/55     Weight: 70.6 kg (155 lb 10.3 oz)  Body mass index is 25.9 kg/m².     Physical Exam  Constitutional:       General: He is in acute distress.      Appearance: Normal appearance.      Comments: Patient visibly in pain, moving around in the bed    HENT:      Head: Normocephalic and atraumatic.      Mouth/Throat:      Mouth: Mucous membranes are moist.   Eyes:      Extraocular Movements: Extraocular movements intact.   Cardiovascular:      Rate and Rhythm: Regular rhythm. Bradycardia present.      Pulses: Normal pulses.      Comments: EKG shows sinus bradycardia, no STEMI at 58 bpm  Pulmonary:      Effort: Pulmonary effort is normal.   Abdominal:      General: Abdomen is flat.      Tenderness: There is abdominal tenderness. There is no right CVA tenderness, left CVA tenderness or guarding.      Comments: Left upper quadrant.  Negative Velasquez's sign   Musculoskeletal:      Right lower leg: No edema.      Left lower leg: No edema.      Comments: Left-sided chest pain that radiates to the left paraspinal musculature of the thoracic spine. + TTP, no erythema, no ecchymosis   Skin:     General: Skin is warm.   Neurological:      General: No focal deficit present.      Mental Status: He is alert and oriented to person, place, and time. Mental status is at baseline.   Psychiatric:         Mood and Affect: Mood  "normal.         Behavior: Behavior normal.         Thought Content: Thought content normal.             Significant Labs: All pertinent labs within the past 24 hours have been reviewed.  Bilirubin:   Recent Labs   Lab 01/13/24  0932 01/13/24  1809 01/14/24  0530 01/15/24  0523 01/16/24  0333   BILITOT 0.8 0.6 1.1* 1.9* 1.5*       BMP:   Recent Labs   Lab 01/16/24  0333   *      K 4.7      CO2 26   BUN 23*   CREATININE 0.7   CALCIUM 8.4*   MG 2.1       CBC:   Recent Labs   Lab 01/15/24  0523 01/15/24  2058 01/16/24  0333   WBC 10.59 7.73 7.32   HGB 15.1 13.1* 12.7*   HCT 47.2 39.4* 38.3*    160 187       CMP:   Recent Labs   Lab 01/15/24  0523 01/16/24  0333   * 136   K 4.1 4.7   CL 99 103   CO2 25 26   * 213*   BUN 27* 23*   CREATININE 0.8 0.7   CALCIUM 8.7 8.4*   PROT 7.3 6.0   ALBUMIN 3.2* 2.3*   BILITOT 1.9* 1.5*   ALKPHOS 36* 28*   AST 23 86*   ALT 35 89*   ANIONGAP 11 7*       Lipase:   No results for input(s): "LIPASE" in the last 48 hours.    Magnesium:   Recent Labs   Lab 01/15/24  0523 01/16/24  0333   MG 2.0 2.1       POCT Glucose:   Recent Labs   Lab 01/15/24  2123 01/15/24  2313 01/16/24  0716   POCTGLUCOSE 244* 233* 186*       Troponin:   No results for input(s): "TROPONINI", "TROPONINIHS" in the last 48 hours.    Urine Studies:   No results for input(s): "COLORU", "APPEARANCEUA", "PHUR", "SPECGRAV", "PROTEINUA", "GLUCUA", "KETONESU", "BILIRUBINUA", "OCCULTUA", "NITRITE", "UROBILINOGEN", "LEUKOCYTESUR", "RBCUA", "WBCUA", "BACTERIA", "SQUAMEPITHEL", "HYALINECASTS" in the last 48 hours.    Invalid input(s): "WRIGHTSUR"    Significant Imaging: I have reviewed all pertinent imaging results/findings within the past 24 hours.  Imaging Results              X-Ray Thoracic Spine AP Lateral (Final result)  Result time 01/14/24 10:17:53      Final result by Ruben Collado MD (01/14/24 10:17:53)                   Impression:      As above.      Electronically signed " by: Ruben Collado MD  Date:    01/14/2024  Time:    10:17               Narrative:    EXAMINATION:  XR THORACIC SPINE AP LATERAL    CLINICAL HISTORY:  back pain;    TECHNIQUE:  AP and lateral views of the thoracic spine.    COMPARISON:  None.    FINDINGS:  Alignment: Alignment is maintained.    Vertebrae: Vertebral body heights are maintained.  No suspicious appearing lytic or blastic lesions.    Discs and facets: Disc heights are maintained.  Multilevel marginal osteophytes noted.    Miscellaneous: No additional findings.                                       X-Ray Lumbar Spine Ap And Lateral (Final result)  Result time 01/14/24 10:19:51      Final result by Ruben Collado MD (01/14/24 10:19:51)                   Impression:      As above.      Electronically signed by: Ruben Collado MD  Date:    01/14/2024  Time:    10:19               Narrative:    EXAMINATION:  XR LUMBAR SPINE AP AND LATERAL    CLINICAL HISTORY:  back pain;    TECHNIQUE:  AP, lateral and spot images were performed of the lumbar spine.    COMPARISON:  None    FINDINGS:  Alignment: Minimal levoconvex curvature of the lumbar spine.  Anteroposterior alignment is maintained.    Vertebrae: Vertebral body heights are maintained.  No suspicious appearing lytic or blastic lesions.    Discs and facets: Disc heights are maintained.  Small marginal osteophytes noted.  There is lower lumbar facet arthropathy.    Miscellaneous: Aortic calcification noted.                                       CTA Chest Non-Coronary (PE Studies) (Final result)  Result time 01/13/24 20:26:03      Final result by Giovanni Mckinney MD (01/13/24 20:26:03)                   Impression:      1. No evidence of PE.  No acute intrathoracic abnormalities identified.  2. Mild to moderate pulmonary emphysema.      Electronically signed by: Giovanni Mckinney MD  Date:    01/13/2024  Time:    20:26               Narrative:    EXAMINATION:  CTA CHEST NON CORONARY (PE STUDIES)    CLINICAL  HISTORY:  Pulmonary embolism (PE) suspected, positive D-dimer;    TECHNIQUE:  Low dose axial images, sagittal and coronal reformations were obtained from the thoracic inlet to the lung bases following the IV administration of 75 mL of Omnipaque 350.  Contrast timing was optimized to evaluate the pulmonary arteries.  MIP images were performed.    COMPARISON:  None    FINDINGS:  Structures at the base of the neck are unremarkable.  Aorta is non-aneurysmal.  The heart is normal in size without pericardial effusion.  Mild coronary artery calcification is seen.  No intraluminal filling defects within the pulmonary arteries to suggest pulmonary thromboembolism.   There is no evidence of mediastinal, axillary, or hilar lymph node enlargement.  The esophagus is unremarkable along its course.    The trachea and bronchi are patent.  The lungs are symmetrically expanded.  Mild to moderate emphysematous changes are seen within the upper lobes.  Lungs show no consolidation, pleural effusion, pulmonary hemorrhage, or infarction.    The visualized abdominal structures show no acute abnormalities.  Multiple remote right rib fracture deformities are seen.  Extrathoracic soft tissues are unremarkable.                                      Assessment/Plan:      * Left upper quadrant pain  -Patient returns back to the hospital after being discharged earlier today for LUQ and left-sided chest wall pain radiating to his back. Patient denies nausea, vomiting, fever, or shortness a breath  -Patient had extensive workup done prior to discharge by Surgery and Cardiology.  -CT abdomen showed possible cholecystitis and Surgery was consulted.  HIDA scan done and showed normal filling of gallbladder with no evidence acute cholecystitis.  -Cardiology consulted for left-sided atypical chest pain. Echo with WMA and stress test recommended outpatient.  -Patient was cleared from both Surgery and Cardiology and discharged with outpatient  "followups  -Presentation today:  Acute cholecystitis ruled out, EKG without any ST changes, likely combination of atypical chest pain versus musculoskeletal mainly thoracic spine issue    Plan:  CTA show,Gallbladder markedly thickened and enlarged, with substantially worsened inflammatory change when compared to the 01/12/2024 CT. Redemonstrated cholelithiasis, with notable stone in the cystic duct unchanged in position. Extensive intraluminal air now noted throughout the gallbladder lumen, possibly on the basis of emphysematous cholecystitis, abscess formation, and/or fistulization with adjoining gastrointestinal tract. Question few small foci of extraluminal air lateral aspect of the gallbladder, which could indicate perforation. Additionally, lobulated focus of increased attenuation measuring the order of 22 mm in this region could indicate hemorrhage.   S/P lap. Ananya on 1.15.24,advancing diet gradually,PT,OT,remain son IV Zosyn,      Pulmonary emphysema  F/u Pulm as well for PFTs    Elevated d-dimer  -Patient presents with elevated 189/75, with normal respiration and pulse, slightly short of breath due to pain found to have D-dimer 0.50 (right at the cutoff).  -CTA chest ordered and showed no evidence of PE or acute intrathoracic abnormalities however it did show mild to moderate pulmonary emphysema  -PE ruled out    Hyperlipidemia  Continue home statin therapy    Acute left-sided thoracic back pain  -see above  -CT thoracic spine and x-ray ordered  -Continue p.r.n. analgesics and lidocaine patches ordered  -Continue to monitor    Type 2 diabetes mellitus  Patient's FSGs are controlled on current medication regimen.  Last A1c reviewed- No results found for: "LABA1C", "HGBA1C"  Most recent fingerstick glucose reviewed-   Recent Labs   Lab 01/13/24  0728 01/13/24  1125 01/13/24  1805   POCTGLUCOSE 91 185* 250*     Current correctional scale  Medium  Maintain anti-hyperglycemic dose as follows- "   Antihyperglycemics (From admission, onward)      Start     Stop Route Frequency Ordered    01/14/24 0042  insulin aspart U-100 pen 0-10 Units         -- SubQ Every 6 hours PRN 01/13/24 2342          SSI  Levemir for basal insulin  Accu-Cheks  Hold Oral hypoglycemics while patient is in the hospital.    Hypertension  Chronic, controlled. Latest blood pressure and vitals reviewed-     Temp:  [97.8 °F (36.6 °C)-98.3 °F (36.8 °C)]   Pulse:  [57-95]   Resp:  [14-28]   BP: (114-189)/(56-75)   SpO2:  [93 %-99 %] .   Home meds for hypertension were reviewed and noted below.   Hypertension Medications               losartan (COZAAR) 25 MG tablet Take 25 mg by mouth.          While in the hospital, will manage blood pressure as follows; Continue home antihypertensive regimen    Will utilize p.r.n. blood pressure medication only if patient's blood pressure greater than 180/110 and he develops symptoms such as worsening chest pain or shortness of breath.    Positive colorectal cancer screening using Cologuard test  Per chart review  Patient with positive Cologuard test  CEA ordered and pending  Recommendation was for GI follow-up and colonoscopy outpatient    Other chest pain  Patient with left-sided atypical chest pain. Seen by Dr. Pavon (Cardiology).  Echo showed no wall abnormalities with normal EF  -Troponin normal, EKG with sinus bradycardia, patient denies shortness of breath, diaphoresis, weakness, nausea/vomiting  -Stress test was recommended outpatient if patient was not willing to stay in-house until Tuesday  -Continue home aspirin/statin  -Cardiology consulted if stress test can be done sooner      VTE Risk Mitigation (From admission, onward)           Ordered     IP VTE LOW RISK PATIENT  Once         01/13/24 2251     Place sequential compression device  Until discontinued         01/13/24 2251                    Discharge Planning   ADRIA:      Code Status: Full Code   Is the patient medically ready for discharge?:      Reason for patient still in hospital (select all that apply): Patient trending condition  Discharge Plan A: Home with family                  Haresh De Oliveira MD  Department of Hospital Medicine   [facility]

## 2024-01-16 NOTE — PROGRESS NOTES
[facility]  General Surgery  Progress Note    Subjective:     History of Present Illness:  Malou Suarez is a 58 y.o. with a pmh of DM and HTN who represents to the hospital with complaints of left sided chest pain with radiation to the back on the left. He was recently admitted for similar complaints. He underwent extensive workup. There were some CT findings initially concerning for cholecystitis; however, his symptoms were not consistent with this. HIDA scan showed filling of the gallbladder which ruled out cholecystitis. HE had some ischemic changes on EKG and cardiology workup was otherwise essentially normal. His symptoms improved significantly and he was discharged. He represented the following day with worsening pain. He now has worsening bilateral chest pain worsened with deep breathing. He also has LUQ pain, RUQ pain, back pain and was found to have a compression fracture of L1. He continues to have no fevers, WBC is normal. Bilirubin was 1.1 yesterday and 1.9 today. He denies nausea/vomiting and had a bowel movement yesterday. He is hemodynamically stable.     Post-Op Info:  Procedure(s) (LRB):  LAPAROSCOPIC CHOLECYSTECTOMY (N/A)   1 Day Post-Op     Interval History:  Postop day 1 from laparoscopic cholecystectomy.  Significant signs of inflammation and necrosis noted.  Doing well today.  Pain is controlled.  Labs are normalizing.    Medications:  Continuous Infusions:   lactated ringers 110 mL/hr at 01/15/24 7343     Scheduled Meds:   acetaminophen  1,000 mg Intravenous Q8H    Followed by    acetaminophen  1,000 mg Oral Q8H    aspirin  81 mg Oral Daily    atorvastatin  40 mg Oral Daily    gabapentin  300 mg Oral TID    insulin detemir U-100  10 Units Subcutaneous QHS    losartan  25 mg Oral Daily    piperacillin-tazobactam (Zosyn) IV (PEDS and ADULTS) (extended infusion is not appropriate)  4.5 g Intravenous Q8H    polyethylene glycol  17 g Oral BID     PRN Meds:bisacodyL, dextrose 10%, dextrose  10%, dextrose 10%, dextrose 10%, glucagon (human recombinant), glucose, glucose, HYDROmorphone, HYDROmorphone, insulin aspart U-100, melatonin, naloxone, ondansetron, ondansetron, oxyCODONE, oxyCODONE, polyethylene glycol, prochlorperazine, prochlorperazine, sodium chloride 0.9%, sodium chloride 0.9%     Review of patient's allergies indicates:  No Known Allergies  Objective:     Vital Signs (Most Recent):  Temp: 98.5 °F (36.9 °C) (01/16/24 0717)  Pulse: 66 (01/16/24 0717)  Resp: 18 (01/16/24 0717)  BP: (!) 107/55 (01/16/24 0717)  SpO2: 96 % (01/16/24 0717) Vital Signs (24h Range):  Temp:  [97.4 °F (36.3 °C)-99.7 °F (37.6 °C)] 98.5 °F (36.9 °C)  Pulse:  [] 66  Resp:  [13-22] 18  SpO2:  [93 %-99 %] 96 %  BP: ()/(54-66) 107/55     Weight: 70.6 kg (155 lb 10.3 oz)  Body mass index is 25.9 kg/m².    Intake/Output - Last 3 Shifts         01/14 0700  01/15 0659 01/15 0700  01/16 0659 01/16 0700  01/17 0659    P.O. 0 120     I.V. (mL/kg)  1056.6 (15)     IV Piggyback  2168.2     Total Intake(mL/kg) 0 (0) 3344.7 (47.4)     Urine (mL/kg/hr)  1155 (0.7) 300 (4.2)    Emesis/NG output  0     Drains  90     Other  0     Stool  0     Blood  250     Total Output  1495 300    Net 0 +1849.7 -300           Urine Occurrence 3 x 0 x     Stool Occurrence 2 x 0 x     Emesis Occurrence  0 x              Physical Exam  Constitutional:       General: He is not in acute distress.     Appearance: Normal appearance.   HENT:      Head: Normocephalic and atraumatic.      Mouth/Throat:      Mouth: Mucous membranes are moist.   Eyes:      Extraocular Movements: Extraocular movements intact.   Cardiovascular:      Rate and Rhythm: Normal rate and regular rhythm.      Pulses: Normal pulses.   Pulmonary:      Effort: Pulmonary effort is normal.   Abdominal:      General: Abdomen is flat.      Tenderness: There is abdominal tenderness. There is no right CVA tenderness, left CVA tenderness or guarding.      Comments: No significant  tenderness to palpation.  Incisions with clean dry dressings intact.  No distention, guarding, or rebound.  Drain with serosanguineous output.     Musculoskeletal:      Right lower leg: No edema.      Left lower leg: No edema.   Skin:     General: Skin is warm.   Neurological:      General: No focal deficit present.      Mental Status: He is alert and oriented to person, place, and time. Mental status is at baseline.   Psychiatric:         Mood and Affect: Mood normal.         Behavior: Behavior normal.         Thought Content: Thought content normal.          Significant Labs:  I have reviewed all pertinent lab results within the past 24 hours.  CBC:   Recent Labs   Lab 01/16/24  0333   WBC 7.32   RBC 4.36*   HGB 12.7*   HCT 38.3*      MCV 88   MCH 29.1   MCHC 33.2     CMP:   Recent Labs   Lab 01/16/24 0333   *   CALCIUM 8.4*   ALBUMIN 2.3*   PROT 6.0      K 4.7   CO2 26      BUN 23*   CREATININE 0.7   ALKPHOS 28*   ALT 89*   AST 86*   BILITOT 1.5*       Significant Diagnostics:  I have reviewed all pertinent imaging results/findings within the past 24 hours.  Assessment/Plan:     * Left upper quadrant pain  57 yo M who represents with worsening left and right chest pain, LUQ and RUQ abdominal pain, and back pain. He continues to have no leukocytosis, afebrile (documented 102.7 overnight, but on recheck was normal) and HIDA was negative for acute cholecystitis. Additionally, his complaints are not consistent with cholecystitis or biliary colic. His bilirubin is mildly elevated. EKG on arrival yesterday was notable for septal infarct of indeterminate age.  Now status post laparoscopic cholecystectomy on January 15th, 2024 with signs of significant inflammation and necrosis of the gallbladder.    - OK for CLD. Can advance to regular diet once tolerating  - Multimodal pain control  - Drain care  - Strict I's & O's. Escobar now removed  - Surgery will remove dressings tomorrow  - Continue  antibiotics for 4 days postoperatively  - Needs DVT PPx  - Trend labs  - EKG previously with ischemic changes, now showing septal infarct age undetermined, workup per primary   - Rest of care per primary        Helder Cramer MD  General Surgery  [facility]

## 2024-01-16 NOTE — PLAN OF CARE
Patient alert and oriented x4. Patient Ivorian speaking.  utilized as needed. Respirations even and unlabored. 02 at 2l. Skin warm and dry. Iv fluids infusing with no complications noted. Iv antibiotics given as ordered. Incision site to abdomen. Dressing intact. No complications noted. Noel drain to abdomen. Drainage emptied throughout shift. Patient complains of pain to abdomen. Pain medication given as needed. Blood sugar monitored throughout shift as ordered.  Insulin given as ordered. boyce catheter in place. No complications noted. Patient has no complaints at this time. Instructed to call for any needs. Bed in lowest position. Call bell within reach.        Problem: Adult Inpatient Plan of Care  Goal: Plan of Care Review  Outcome: Ongoing, Progressing  Flowsheets (Taken 1/16/2024 0501)  Plan of Care Reviewed With: patient  Goal: Patient-Specific Goal (Individualized)  Outcome: Ongoing, Progressing  Goal: Absence of Hospital-Acquired Illness or Injury  Outcome: Ongoing, Progressing  Intervention: Identify and Manage Fall Risk  Flowsheets (Taken 1/16/2024 0501)  Safety Promotion/Fall Prevention:   assistive device/personal item within reach   bed alarm refused   Fall Risk reviewed with patient/family   nonskid shoes/socks when out of bed     Problem: Diabetes Comorbidity  Goal: Blood Glucose Level Within Targeted Range  Outcome: Ongoing, Progressing  Intervention: Monitor and Manage Glycemia  Flowsheets (Taken 1/16/2024 0501)  Glycemic Management:   blood glucose monitored   supplemental insulin given

## 2024-01-16 NOTE — PT/OT/SLP PROGRESS
Occupational Therapy   Treatment    Name: Malou Suarez  MRN: [id number]  Admitting Diagnosis:  Left upper quadrant pain  1 Day Post-Op    Recommendations:     Discharge Recommendations: Low Intensity Therapy  Discharge Equipment Recommendations:  bedside commode  Barriers to discharge:  None    Assessment:     Malou Suarez is a 58 y.o. male with a medical diagnosis of Left upper quadrant pain.  He presents with Hob elevated in bed with IV present and ANDREEA drain attached with oxygen at 2L NC . Performance deficits affecting function are weakness, impaired endurance, pain, impaired cardiopulmonary response to activity, impaired balance, impaired self care skills.     Rehab Prognosis:  Good; patient would benefit from acute skilled OT services to address these deficits and reach maximum level of function.       Plan:     Patient to be seen 3 x/week to address the above listed problems via self-care/home management, therapeutic activities, therapeutic exercises  Plan of Care Expires: 01/21/24  Plan of Care Reviewed with: patient, other (see comments) (children)    Subjective     Chief Complaint: abdominal pain   Patient/Family Comments/goals: Patient wanted to go to bathroom today   Pain/Comfort:  Pain Rating 1: 2/10  Location - Side 1: Right  Location 1: abdomen  Pain Addressed 1: Pre-medicate for activity, Reposition, Distraction, Cessation of Activity  Pain Rating Post-Intervention 1: 2/10    Objective:     Communicated with: RNEfraín,  prior to session.  Patient found HOB elevated with telemetry, peripheral IV, bed alarm, oxygen, ANDREEA drain, pulse ox (continuous) upon OT entry to room.    General Precautions: Standard, fall, respiratory    Orthopedic Precautions:N/A  Braces: N/A  Respiratory Status: Nasal cannula, flow 2 L/min; pt. Stayed in mid 90s with oxygen      Occupational Performance:     Bed Mobility:    Patient completed Rolling/Turning to Left with  stand by assistance  Patient completed Scooting/Bridging with  stand by assistance  Patient completed Supine to Sit with stand by assistance     Functional Mobility/Transfers:  Patient completed Sit <> Stand Transfer with stand by assistance  with  hand-held assist   Patient completed Bed <> Chair Transfer using Step Transfer technique with stand by assistance with hand-held assist  Patient completed Toilet Transfer Step Transfer technique with stand by assistance with  hand-held assist  Functional Mobility: Patient walked from bed to bathroom and then to chair with PTA and occupational therapy for assisting with Iv and ANDREEA drain.     Activities of Daily Living:  Toileting: stand by assistance /supervision to grab wipes after BM       AMPAC 6 Click ADL: 14    Treatment & Education:  Occupational therapy educated patient re: placing wipes in trash vs. Toilet  PTA provided translation during session     Patient left up in chair with all lines intact, call button in reach, and RN  notified    GOALS:   Multidisciplinary Problems       Occupational Therapy Goals          Problem: Occupational Therapy    Goal Priority Disciplines Outcome Interventions   Occupational Therapy Goal     OT, PT/OT Ongoing, Progressing    Description: Goals to be met by: 01/21/24     Patient will increase functional independence with ADLs by performing:    UE Dressing with [place].  LE Dressing with Set-up Assistance.  Grooming while seated at sink with Set-up Assistance.  Toileting from bedside commode with Set-up Assistance for hygiene and clothing management.   Sitting at edge of bed x 30-60  minutes with Supervision.  Toilet transfer to bedside commode with Supervision.  Upper extremity exercise program x10  reps per handout, with supervision.                         Time Tracking:     OT Date of Treatment: 01/16/24  OT Start Time: 1542  OT Stop Time: 1608  OT Total Time (min): 26 min    Billable Minutes:Self Care/Home Management 15   Therapeutic Activity 11     OT/SHELBY: OT        Co-treat with PTA    1/16/2024

## 2024-01-16 NOTE — ASSESSMENT & PLAN NOTE
57 yo M who represents with worsening left and right chest pain, LUQ and RUQ abdominal pain, and back pain. He continues to have no leukocytosis, afebrile (documented 102.7 overnight, but on recheck was normal) and HIDA was negative for acute cholecystitis. Additionally, his complaints are not consistent with cholecystitis or biliary colic. His bilirubin is mildly elevated. EKG on arrival yesterday was notable for septal infarct of indeterminate age.  Now status post laparoscopic cholecystectomy on January 15th, 2024 with signs of significant inflammation and necrosis of the gallbladder.    - OK for CLD. Can advance to regular diet once tolerating  - Multimodal pain control  - Drain care  - Strict I's & O's. Escobar now removed  - Surgery will remove dressings tomorrow  - Continue antibiotics for 4 days postoperatively  - Needs DVT PPx  - Trend labs  - EKG previously with ischemic changes, now showing septal infarct age undetermined, workup per primary   - Rest of care per primary

## 2024-01-16 NOTE — NURSING
Escobar catheter removed at this time per md orders. Patient tolerated procedure. Informed patient to notify staff once he has to void.

## 2024-01-16 NOTE — NURSING
Report received from Colin SEE RN.  Evaluated general patient appearance and condition. Patient resting quietly, easily aroused via verbal stimuli. No apparent distress noted.

## 2024-01-16 NOTE — PT/OT/SLP PROGRESS
Physical Therapy Treatment    Patient Name:  Malou Suarez   MRN:  [id number]    Recommendations:     Discharge Recommendations: Low Intensity Therapy  Discharge Equipment Recommendations: bedside commode (- patient reports not wanting/needing any equipment.)  Barriers to discharge: None    Assessment:     Malou Suarez is a 58 y.o. male admitted with a medical diagnosis of Left upper quadrant pain.  He presents with the following impairments/functional limitations: weakness, impaired endurance, gait instability, impaired balance, decreased lower extremity function, decreased upper extremity function, pain, decreased safety awareness, decreased ROM, impaired cardiopulmonary response to activity .    Rehab Prognosis: Good; patient would benefit from acute skilled PT services to address these deficits and reach maximum level of function.    Recent Surgery: Procedure(s) (LRB):  LAPAROSCOPIC CHOLECYSTECTOMY (N/A) 1 Day Post-Op    Plan:     During this hospitalization, patient to be seen 4 x/week to address the identified rehab impairments via gait training, therapeutic activities, therapeutic exercises and progress toward the following goals:    Plan of Care Expires:  01/28/24    Subjective     Chief Complaint: diarrhea    Patient/Family Comments/goals: pt is pleasant and agreeable to therapy   Pain/Comfort:  Pain Rating 1: 2/10  Location 1: abdomen  Pain Addressed 1: Pre-medicate for activity, Nurse notified, Reposition  Pain Rating Post-Intervention 1: 2/10      Objective:     Communicated with nurse  prior to session.  Patient found HOB elevated with telemetry, peripheral IV, bed alarm, oxygen ,ANDREEA drain upon PT entry to room.     General Precautions: Standard, fall, respiratory  Orthopedic Precautions: N/A(abdominal precautions)   Braces: N/A  Respiratory Status: Nasal cannula, flow 2 L/min   SpO2 : 89%-94% on RA on exertions . HR stable   Functional Mobility:  Bed Mobility: educated pt on log roll technique for bed mobility)      Rolling Right: supervision  Scooting: supervision  Supine to Sit: supervision  Transfers:     Sit to Stand: from bed and toilet seat supervision with no AD  Toilet Transfer: stand by assistance with  no AD  using  Step Transfer  Gait: pt ambulated 300 ft with no AD, SBA/CGA . Noted with decreased karol,decreased step length , no LOB . VC's for safety technique and pursed lip breathing technique. Pt denied SOB or dizziness .   Balance:  good       AM-PAC 6 CLICK MOBILITY  Turning over in bed (including adjusting bedclothes, sheets and blankets)?: 4  Sitting down on and standing up from a chair with arms (e.g., wheelchair, bedside commode, etc.): 4  Moving from lying on back to sitting on the side of the bed?: 4  Moving to and from a bed to a chair (including a wheelchair)?: 3  Need to walk in hospital room?: 3  Climbing 3-5 steps with a railing?: 3  Basic Mobility Total Score: 21       Treatment & Education:  Progressive Mobility Level 3: Recommend patient be assisted out of bed to chair, toilet, and/or bedside commode with </= SBA /CGA  .   Encouraged pt to perform BLE AP throughout the day , pt verbalized understanding   Patient left up in chair with all lines intact, call button in reach, nurse notified, and family  present..    GOALS:   Multidisciplinary Problems       Physical Therapy Goals          Problem: Physical Therapy    Goal Priority Disciplines Outcome Goal Variances Interventions   Physical Therapy Goal     PT, PT/OT Ongoing, Progressing     Description: Goals to be met by:  2024    Patient will increase functional independence with mobility by performin. Supine to sit with Modified [place]  2. Sit to supine with Modified [place]  3. Sit to stand transfer with Modified [place]  4. Gait  x 400 feet with Modified [place] using Rolling Walker.    Recommend: Low Intensity Therapy at time of discharge to home with family.                             Time Tracking:     PT  Received On: 01/16/24  PT Start Time: 1542     PT Stop Time: 1608  PT Total Time (min): 26 min     Billable Minutes: Gait Training 26 min with OT     Treatment Type: Treatment  PT/PTA: PTA     Number of PTA visits since last PT visit: 1 01/16/2024

## 2024-01-16 NOTE — ASSESSMENT & PLAN NOTE
-Patient returns back to the hospital after being discharged earlier today for LUQ and left-sided chest wall pain radiating to his back. Patient denies nausea, vomiting, fever, or shortness a breath  -Patient had extensive workup done prior to discharge by Surgery and Cardiology.  -CT abdomen showed possible cholecystitis and Surgery was consulted.  HIDA scan done and showed normal filling of gallbladder with no evidence acute cholecystitis.  -Cardiology consulted for left-sided atypical chest pain. Echo with WMA and stress test recommended outpatient.  -Patient was cleared from both Surgery and Cardiology and discharged with outpatient followups  -Presentation today:  Acute cholecystitis ruled out, EKG without any ST changes, likely combination of atypical chest pain versus musculoskeletal mainly thoracic spine issue    Plan:  CTA show,Gallbladder markedly thickened and enlarged, with substantially worsened inflammatory change when compared to the 01/12/2024 CT. Redemonstrated cholelithiasis, with notable stone in the cystic duct unchanged in position. Extensive intraluminal air now noted throughout the gallbladder lumen, possibly on the basis of emphysematous cholecystitis, abscess formation, and/or fistulization with adjoining gastrointestinal tract. Question few small foci of extraluminal air lateral aspect of the gallbladder, which could indicate perforation. Additionally, lobulated focus of increased attenuation measuring the order of 22 mm in this region could indicate hemorrhage.   S/P lap. Ananya on 1.15.24,advancing diet gradually,PT,OT,remain son IV Zosyn,

## 2024-01-16 NOTE — SUBJECTIVE & OBJECTIVE
Past Medical History:   Diagnosis Date    Diabetes mellitus     Pulmonary emphysema 2024       History reviewed. No pertinent surgical history.    Review of patient's allergies indicates:  No Known Allergies    No current facility-administered medications on file prior to encounter.     Current Outpatient Medications on File Prior to Encounter   Medication Sig    alogliptin-pioglitazone 12.5-15 mg Tab Take by mouth once daily.    aspirin (ECOTRIN) 81 MG EC tablet Take 1 tablet (81 mg total) by mouth once daily.    atorvastatin (LIPITOR) 40 MG tablet Take 1 tablet (40 mg total) by mouth once daily.    BASAGLAR KWIKPEN U-100 INSULIN glargine 100 units/mL SubQ pen SMARTSI Unit(s) SUB-Q Every Night    empagliflozin 10 mg Tab Take 10 mg by mouth once daily.    ergocalciferol (ERGOCALCIFEROL) 50,000 unit Cap Take 50,000 Units by mouth every 7 days.    insulin aspart protamine-insulin aspart (NOVOLOG 70/30) 100 unit/mL (70-30) InPn pen Inject into the skin 2 (two) times daily before meals.    insulin glargine (LANTUS) 100 unit/mL injection Inject into the skin every evening.    JANUMET -1,000 mg TM24 Take 1 tablet by mouth every morning.    losartan (COZAAR) 25 MG tablet Take 25 mg by mouth.    traMADoL (ULTRAM) 50 mg tablet Take 1 tablet (50 mg total) by mouth every 8 (eight) hours as needed for Pain.    VEMLIDY 25 mg Tab Take 1 tablet by mouth.     Family History    None       Tobacco Use    Smoking status: Former    Smokeless tobacco: Not on file   Substance and Sexual Activity    Alcohol use: No    Drug use: No    Sexual activity: Not on file     Review of Systems   Constitutional:  Positive for appetite change.   HENT: Negative.     Respiratory: Negative.     Cardiovascular:  Positive for chest pain.        Left-sided squeezing/sharp chest pain   Gastrointestinal:  Positive for abdominal pain.   Genitourinary: Negative.    Musculoskeletal:  Positive for back pain and myalgias.        Mostly on left  thoracic spine   Neurological: Negative.    Psychiatric/Behavioral: Negative.       Objective:     Vital Signs (Most Recent):  Temp: 98.5 °F (36.9 °C) (01/16/24 0717)  Pulse: 66 (01/16/24 0717)  Resp: 18 (01/16/24 0717)  BP: (!) 107/55 (01/16/24 0717)  SpO2: 96 % (01/16/24 0717) Vital Signs (24h Range):  Temp:  [97.4 °F (36.3 °C)-99.7 °F (37.6 °C)] 98.5 °F (36.9 °C)  Pulse:  [] 66  Resp:  [13-22] 18  SpO2:  [93 %-99 %] 96 %  BP: ()/(54-66) 107/55     Weight: 70.6 kg (155 lb 10.3 oz)  Body mass index is 25.9 kg/m².     Physical Exam  Constitutional:       General: He is in acute distress.      Appearance: Normal appearance.      Comments: Patient visibly in pain, moving around in the bed    HENT:      Head: Normocephalic and atraumatic.      Mouth/Throat:      Mouth: Mucous membranes are moist.   Eyes:      Extraocular Movements: Extraocular movements intact.   Cardiovascular:      Rate and Rhythm: Regular rhythm. Bradycardia present.      Pulses: Normal pulses.      Comments: EKG shows sinus bradycardia, no STEMI at 58 bpm  Pulmonary:      Effort: Pulmonary effort is normal.   Abdominal:      General: Abdomen is flat.      Tenderness: There is abdominal tenderness. There is no right CVA tenderness, left CVA tenderness or guarding.      Comments: Left upper quadrant.  Negative Velasquez's sign   Musculoskeletal:      Right lower leg: No edema.      Left lower leg: No edema.      Comments: Left-sided chest pain that radiates to the left paraspinal musculature of the thoracic spine. + TTP, no erythema, no ecchymosis   Skin:     General: Skin is warm.   Neurological:      General: No focal deficit present.      Mental Status: He is alert and oriented to person, place, and time. Mental status is at baseline.   Psychiatric:         Mood and Affect: Mood normal.         Behavior: Behavior normal.         Thought Content: Thought content normal.             Significant Labs: All pertinent labs within the past 24  "hours have been reviewed.  Bilirubin:   Recent Labs   Lab 01/13/24  0932 01/13/24  1809 01/14/24  0530 01/15/24  0523 01/16/24  0333   BILITOT 0.8 0.6 1.1* 1.9* 1.5*       BMP:   Recent Labs   Lab 01/16/24  0333   *      K 4.7      CO2 26   BUN 23*   CREATININE 0.7   CALCIUM 8.4*   MG 2.1       CBC:   Recent Labs   Lab 01/15/24  0523 01/15/24  2058 01/16/24  0333   WBC 10.59 7.73 7.32   HGB 15.1 13.1* 12.7*   HCT 47.2 39.4* 38.3*    160 187       CMP:   Recent Labs   Lab 01/15/24  0523 01/16/24  0333   * 136   K 4.1 4.7   CL 99 103   CO2 25 26   * 213*   BUN 27* 23*   CREATININE 0.8 0.7   CALCIUM 8.7 8.4*   PROT 7.3 6.0   ALBUMIN 3.2* 2.3*   BILITOT 1.9* 1.5*   ALKPHOS 36* 28*   AST 23 86*   ALT 35 89*   ANIONGAP 11 7*       Lipase:   No results for input(s): "LIPASE" in the last 48 hours.    Magnesium:   Recent Labs   Lab 01/15/24  0523 01/16/24  0333   MG 2.0 2.1       POCT Glucose:   Recent Labs   Lab 01/15/24  2123 01/15/24  2313 01/16/24  0716   POCTGLUCOSE 244* 233* 186*       Troponin:   No results for input(s): "TROPONINI", "TROPONINIHS" in the last 48 hours.    Urine Studies:   No results for input(s): "COLORU", "APPEARANCEUA", "PHUR", "SPECGRAV", "PROTEINUA", "GLUCUA", "KETONESU", "BILIRUBINUA", "OCCULTUA", "NITRITE", "UROBILINOGEN", "LEUKOCYTESUR", "RBCUA", "WBCUA", "BACTERIA", "SQUAMEPITHEL", "HYALINECASTS" in the last 48 hours.    Invalid input(s): "WRIGHTSUR"    Significant Imaging: I have reviewed all pertinent imaging results/findings within the past 24 hours.  Imaging Results              X-Ray Thoracic Spine AP Lateral (Final result)  Result time 01/14/24 10:17:53      Final result by Ruben Collado MD (01/14/24 10:17:53)                   Impression:      As above.      Electronically signed by: Ruben Collado MD  Date:    01/14/2024  Time:    10:17               Narrative:    EXAMINATION:  XR THORACIC SPINE AP LATERAL    CLINICAL HISTORY:  back " pain;    TECHNIQUE:  AP and lateral views of the thoracic spine.    COMPARISON:  None.    FINDINGS:  Alignment: Alignment is maintained.    Vertebrae: Vertebral body heights are maintained.  No suspicious appearing lytic or blastic lesions.    Discs and facets: Disc heights are maintained.  Multilevel marginal osteophytes noted.    Miscellaneous: No additional findings.                                       X-Ray Lumbar Spine Ap And Lateral (Final result)  Result time 01/14/24 10:19:51      Final result by Ruben Collado MD (01/14/24 10:19:51)                   Impression:      As above.      Electronically signed by: Ruben Collado MD  Date:    01/14/2024  Time:    10:19               Narrative:    EXAMINATION:  XR LUMBAR SPINE AP AND LATERAL    CLINICAL HISTORY:  back pain;    TECHNIQUE:  AP, lateral and spot images were performed of the lumbar spine.    COMPARISON:  None    FINDINGS:  Alignment: Minimal levoconvex curvature of the lumbar spine.  Anteroposterior alignment is maintained.    Vertebrae: Vertebral body heights are maintained.  No suspicious appearing lytic or blastic lesions.    Discs and facets: Disc heights are maintained.  Small marginal osteophytes noted.  There is lower lumbar facet arthropathy.    Miscellaneous: Aortic calcification noted.                                       CTA Chest Non-Coronary (PE Studies) (Final result)  Result time 01/13/24 20:26:03      Final result by Giovanni Mckinney MD (01/13/24 20:26:03)                   Impression:      1. No evidence of PE.  No acute intrathoracic abnormalities identified.  2. Mild to moderate pulmonary emphysema.      Electronically signed by: Giovanni Mciknney MD  Date:    01/13/2024  Time:    20:26               Narrative:    EXAMINATION:  CTA CHEST NON CORONARY (PE STUDIES)    CLINICAL HISTORY:  Pulmonary embolism (PE) suspected, positive D-dimer;    TECHNIQUE:  Low dose axial images, sagittal and coronal reformations were obtained from the  thoracic inlet to the lung bases following the IV administration of 75 mL of Omnipaque 350.  Contrast timing was optimized to evaluate the pulmonary arteries.  MIP images were performed.    COMPARISON:  None    FINDINGS:  Structures at the base of the neck are unremarkable.  Aorta is non-aneurysmal.  The heart is normal in size without pericardial effusion.  Mild coronary artery calcification is seen.  No intraluminal filling defects within the pulmonary arteries to suggest pulmonary thromboembolism.   There is no evidence of mediastinal, axillary, or hilar lymph node enlargement.  The esophagus is unremarkable along its course.    The trachea and bronchi are patent.  The lungs are symmetrically expanded.  Mild to moderate emphysematous changes are seen within the upper lobes.  Lungs show no consolidation, pleural effusion, pulmonary hemorrhage, or infarction.    The visualized abdominal structures show no acute abnormalities.  Multiple remote right rib fracture deformities are seen.  Extrathoracic soft tissues are unremarkable.

## 2024-01-16 NOTE — ANESTHESIA POSTPROCEDURE EVALUATION
Anesthesia Post Evaluation    Patient: Malou Suarez    Procedure(s) Performed: Procedure(s) (LRB):  LAPAROSCOPIC CHOLECYSTECTOMY (N/A)    Final Anesthesia Type: general      Patient location during evaluation: PACU  Patient participation: Yes- Able to Participate  Level of consciousness: awake and alert and oriented  Post-procedure vital signs: reviewed and stable  Pain management: adequate  Airway patency: patent    PONV status at discharge: No PONV  Anesthetic complications: no      Cardiovascular status: hemodynamically stable and blood pressure returned to baseline  Respiratory status: spontaneous ventilation, room air and unassisted  Hydration status: euvolemic  Follow-up not needed.              Vitals Value Taken Time   /54 01/15/24 1817   Temp 36.3 °C (97.4 °F) 01/15/24 1747   Pulse 99 01/15/24 1828   Resp 15 01/15/24 1828   SpO2 96 % 01/15/24 1828   Vitals shown include unvalidated device data.      Event Time   Out of Recovery 01/15/2024 18:30:55         Pain/Oumou Score: Pain Rating Prior to Med Admin: 9 (1/15/2024  1:58 PM)  Pain Rating Post Med Admin: 5 (1/15/2024  5:37 PM)  Oumou Score: 8 (1/15/2024  6:28 PM)

## 2024-01-16 NOTE — NURSING
[facility] [place]  Nurses Note -- 4 Eyes      1/15/24      Skin assessed on: Q Shift      [x] No Pressure Injuries Present    [x]Prevention Measures Documented    [] Yes LDA  for Pressure Injury Previously documented     [] Yes New Pressure Injury Discovered   [] LDA for New Pressure Injury Added      Attending RN:  Coral Donaldson RN     Second RN:  Sydnee Oakes Rn

## 2024-01-17 ENCOUNTER — TELEPHONE (OUTPATIENT)
Dept: CARDIOLOGY | Facility: CLINIC | Age: 59
End: 2024-01-17
Payer: COMMERCIAL

## 2024-01-17 PROBLEM — K81.0 CHOLECYSTITIS, ACUTE: Status: ACTIVE | Noted: 2024-01-17

## 2024-01-17 LAB
ALBUMIN SERPL BCP-MCNC: 2.2 G/DL (ref 3.5–5.2)
ALP SERPL-CCNC: 30 U/L (ref 55–135)
ALT SERPL W/O P-5'-P-CCNC: 132 U/L (ref 10–44)
ANION GAP SERPL CALC-SCNC: 6 MMOL/L (ref 8–16)
AST SERPL-CCNC: 113 U/L (ref 10–40)
BASOPHILS # BLD AUTO: ABNORMAL K/UL (ref 0–0.2)
BASOPHILS NFR BLD: 0 % (ref 0–1.9)
BILIRUB SERPL-MCNC: 1 MG/DL (ref 0.1–1)
BUN SERPL-MCNC: 20 MG/DL (ref 6–20)
CALCIUM SERPL-MCNC: 8.5 MG/DL (ref 8.7–10.5)
CHLORIDE SERPL-SCNC: 103 MMOL/L (ref 95–110)
CO2 SERPL-SCNC: 26 MMOL/L (ref 23–29)
CREAT SERPL-MCNC: 0.6 MG/DL (ref 0.5–1.4)
DIFFERENTIAL METHOD BLD: ABNORMAL
EOSINOPHIL # BLD AUTO: ABNORMAL K/UL (ref 0–0.5)
EOSINOPHIL NFR BLD: 1 % (ref 0–8)
ERYTHROCYTE [DISTWIDTH] IN BLOOD BY AUTOMATED COUNT: 13.2 % (ref 11.5–14.5)
EST. GFR  (NO RACE VARIABLE): >60 ML/MIN/1.73 M^2
GLUCOSE SERPL-MCNC: 181 MG/DL (ref 70–110)
HCT VFR BLD AUTO: 36.1 % (ref 40–54)
HGB BLD-MCNC: 12.2 G/DL (ref 14–18)
IMM GRANULOCYTES # BLD AUTO: ABNORMAL K/UL (ref 0–0.04)
IMM GRANULOCYTES NFR BLD AUTO: ABNORMAL % (ref 0–0.5)
LYMPHOCYTES # BLD AUTO: ABNORMAL K/UL (ref 1–4.8)
LYMPHOCYTES NFR BLD: 18 % (ref 18–48)
MCH RBC QN AUTO: 29.2 PG (ref 27–31)
MCHC RBC AUTO-ENTMCNC: 33.8 G/DL (ref 32–36)
MCV RBC AUTO: 86 FL (ref 82–98)
MONOCYTES # BLD AUTO: ABNORMAL K/UL (ref 0.3–1)
MONOCYTES NFR BLD: 6 % (ref 4–15)
NEUTROPHILS NFR BLD: 60 % (ref 38–73)
NEUTS BAND NFR BLD MANUAL: 15 %
NRBC BLD-RTO: 0 /100 WBC
PLATELET # BLD AUTO: 208 K/UL (ref 150–450)
PMV BLD AUTO: 10.6 FL (ref 9.2–12.9)
POCT GLUCOSE: 155 MG/DL (ref 70–110)
POCT GLUCOSE: 171 MG/DL (ref 70–110)
POCT GLUCOSE: 191 MG/DL (ref 70–110)
POCT GLUCOSE: 250 MG/DL (ref 70–110)
POTASSIUM SERPL-SCNC: 3.9 MMOL/L (ref 3.5–5.1)
PROT SERPL-MCNC: 5.9 G/DL (ref 6–8.4)
RBC # BLD AUTO: 4.18 M/UL (ref 4.6–6.2)
SODIUM SERPL-SCNC: 135 MMOL/L (ref 136–145)
WBC # BLD AUTO: 7.71 K/UL (ref 3.9–12.7)

## 2024-01-17 PROCEDURE — 63600175 PHARM REV CODE 636 W HCPCS: Performed by: HOSPITALIST

## 2024-01-17 PROCEDURE — 25000003 PHARM REV CODE 250: Performed by: HOSPITALIST

## 2024-01-17 PROCEDURE — 80053 COMPREHEN METABOLIC PANEL: CPT | Performed by: HOSPITALIST

## 2024-01-17 PROCEDURE — 85007 BL SMEAR W/DIFF WBC COUNT: CPT

## 2024-01-17 PROCEDURE — 11000001 HC ACUTE MED/SURG PRIVATE ROOM

## 2024-01-17 PROCEDURE — 97116 GAIT TRAINING THERAPY: CPT | Mod: CQ

## 2024-01-17 PROCEDURE — 85027 COMPLETE CBC AUTOMATED: CPT

## 2024-01-17 PROCEDURE — 97110 THERAPEUTIC EXERCISES: CPT

## 2024-01-17 PROCEDURE — 36415 COLL VENOUS BLD VENIPUNCTURE: CPT | Mod: XB

## 2024-01-17 PROCEDURE — 25000003 PHARM REV CODE 250

## 2024-01-17 PROCEDURE — 63600175 PHARM REV CODE 636 W HCPCS

## 2024-01-17 PROCEDURE — 36415 COLL VENOUS BLD VENIPUNCTURE: CPT | Performed by: HOSPITALIST

## 2024-01-17 RX ADMIN — ACETAMINOPHEN 1000 MG: 500 TABLET ORAL at 03:01

## 2024-01-17 RX ADMIN — OXYCODONE 5 MG: 5 TABLET ORAL at 11:01

## 2024-01-17 RX ADMIN — GABAPENTIN 300 MG: 300 CAPSULE ORAL at 10:01

## 2024-01-17 RX ADMIN — GABAPENTIN 300 MG: 300 CAPSULE ORAL at 03:01

## 2024-01-17 RX ADMIN — PIPERACILLIN AND TAZOBACTAM 4.5 G: 4; .5 INJECTION, POWDER, LYOPHILIZED, FOR SOLUTION INTRAVENOUS; PARENTERAL at 10:01

## 2024-01-17 RX ADMIN — INSULIN ASPART 2 UNITS: 100 INJECTION, SOLUTION INTRAVENOUS; SUBCUTANEOUS at 11:01

## 2024-01-17 RX ADMIN — OXYCODONE 5 MG: 5 TABLET ORAL at 10:01

## 2024-01-17 RX ADMIN — INSULIN ASPART 2 UNITS: 100 INJECTION, SOLUTION INTRAVENOUS; SUBCUTANEOUS at 05:01

## 2024-01-17 RX ADMIN — OXYCODONE 5 MG: 5 TABLET ORAL at 03:01

## 2024-01-17 RX ADMIN — LOPERAMIDE HYDROCHLORIDE 2 MG: 2 CAPSULE ORAL at 06:01

## 2024-01-17 RX ADMIN — ACETAMINOPHEN 1000 MG: 500 TABLET ORAL at 04:01

## 2024-01-17 RX ADMIN — INSULIN DETEMIR 10 UNITS: 100 INJECTION, SOLUTION SUBCUTANEOUS at 10:01

## 2024-01-17 RX ADMIN — ATORVASTATIN CALCIUM 40 MG: 40 TABLET, FILM COATED ORAL at 08:01

## 2024-01-17 RX ADMIN — ACETAMINOPHEN 1000 MG: 500 TABLET ORAL at 10:01

## 2024-01-17 RX ADMIN — PIPERACILLIN AND TAZOBACTAM 4.5 G: 4; .5 INJECTION, POWDER, LYOPHILIZED, FOR SOLUTION INTRAVENOUS; PARENTERAL at 03:01

## 2024-01-17 RX ADMIN — PIPERACILLIN AND TAZOBACTAM 4.5 G: 4; .5 INJECTION, POWDER, LYOPHILIZED, FOR SOLUTION INTRAVENOUS; PARENTERAL at 06:01

## 2024-01-17 RX ADMIN — ASPIRIN 81 MG: 81 TABLET, COATED ORAL at 08:01

## 2024-01-17 RX ADMIN — GABAPENTIN 300 MG: 300 CAPSULE ORAL at 08:01

## 2024-01-17 RX ADMIN — SODIUM CHLORIDE, POTASSIUM CHLORIDE, SODIUM LACTATE AND CALCIUM CHLORIDE: 600; 310; 30; 20 INJECTION, SOLUTION INTRAVENOUS at 03:01

## 2024-01-17 NOTE — SUBJECTIVE & OBJECTIVE
Interval History:  Doing fairly well postoperatively.  Pain is controlled.  Drain output is serous.  Lab values normalizing other than mild transaminitis.  Multiple bowel movements throughout the night.    Medications:  Continuous Infusions:  Scheduled Meds:   acetaminophen  1,000 mg Oral Q8H    aspirin  81 mg Oral Daily    atorvastatin  40 mg Oral Daily    gabapentin  300 mg Oral TID    insulin detemir U-100  10 Units Subcutaneous QHS    piperacillin-tazobactam (Zosyn) IV (PEDS and ADULTS) (extended infusion is not appropriate)  4.5 g Intravenous Q8H    polyethylene glycol  17 g Oral BID     PRN Meds:bisacodyL, dextrose 10%, dextrose 10%, dextrose 10%, dextrose 10%, glucagon (human recombinant), glucose, glucose, HYDROmorphone, HYDROmorphone, insulin aspart U-100, loperamide, melatonin, naloxone, ondansetron, ondansetron, oxyCODONE, oxyCODONE, polyethylene glycol, prochlorperazine, prochlorperazine, sodium chloride 0.9%, sodium chloride 0.9%     Review of patient's allergies indicates:  No Known Allergies  Objective:     Vital Signs (Most Recent):  Temp: 97 °F (36.1 °C) (01/17/24 1115)  Pulse: 65 (01/17/24 1115)  Resp: 18 (01/17/24 1131)  BP: (!) 148/72 (01/17/24 1115)  SpO2: (!) 93 % (01/17/24 1115) Vital Signs (24h Range):  Temp:  [97 °F (36.1 °C)-98.6 °F (37 °C)] 97 °F (36.1 °C)  Pulse:  [64-77] 65  Resp:  [17-19] 18  SpO2:  [90 %-98 %] 93 %  BP: (110-148)/(60-72) 148/72     Weight: 70.6 kg (155 lb 10.3 oz)  Body mass index is 25.9 kg/m².    Intake/Output - Last 3 Shifts         01/15 0700  01/16 0659 01/16 0700  01/17 0659 01/17 0700  01/18 0659    P.O. 120 555 240    I.V. (mL/kg) 1056.6 (15) 2591.9 (36.7)     IV Piggyback 2168.2      Total Intake(mL/kg) 3344.7 (47.4) 3146.9 (44.6) 240 (3.4)    Urine (mL/kg/hr) 1155 (0.7) 300 (0.2)     Emesis/NG output 0      Drains 90 60     Other 0      Stool 0 0     Blood 250      Total Output 1495 360     Net +1849.7 +2786.9 +240           Urine Occurrence 0 x 6 x     Stool  Occurrence 0 x 4 x     Emesis Occurrence 0 x               Physical Exam  Constitutional:       General: He is not in acute distress.     Appearance: Normal appearance.   HENT:      Head: Normocephalic and atraumatic.      Mouth/Throat:      Mouth: Mucous membranes are moist.   Eyes:      Extraocular Movements: Extraocular movements intact.   Cardiovascular:      Rate and Rhythm: Normal rate and regular rhythm.      Pulses: Normal pulses.   Pulmonary:      Effort: Pulmonary effort is normal.   Abdominal:      General: Abdomen is flat.      Tenderness: There is abdominal tenderness. There is no right CVA tenderness, left CVA tenderness or guarding.      Comments: No significant tenderness to palpation.  Incisions with clean dry dressings intact.  No distention, guarding, or rebound.  Drain with serous output.     Musculoskeletal:      Right lower leg: No edema.      Left lower leg: No edema.   Skin:     General: Skin is warm.   Neurological:      General: No focal deficit present.      Mental Status: He is alert and oriented to person, place, and time. Mental status is at baseline.   Psychiatric:         Mood and Affect: Mood normal.         Behavior: Behavior normal.         Thought Content: Thought content normal.          Significant Labs:  I have reviewed all pertinent lab results within the past 24 hours.  CBC:   Recent Labs   Lab 01/17/24  0452   WBC 7.71   RBC 4.18*   HGB 12.2*   HCT 36.1*      MCV 86   MCH 29.2   MCHC 33.8     CMP:   Recent Labs   Lab 01/17/24  0452   *   CALCIUM 8.5*   ALBUMIN 2.2*   PROT 5.9*   *   K 3.9   CO2 26      BUN 20   CREATININE 0.6   ALKPHOS 30*   *   *   BILITOT 1.0       Significant Diagnostics:  I have reviewed all pertinent imaging results/findings within the past 24 hours.

## 2024-01-17 NOTE — PT/OT/SLP PROGRESS
Occupational Therapy   Treatment    Name: Malou Suarez  MRN: [id number]  Admitting Diagnosis:  Left upper quadrant pain  2 Days Post-Op    Recommendations:     Discharge Recommendations: Low Intensity Therapy  Discharge Equipment Recommendations:  bedside commode  Barriers to discharge:  None    Assessment:     Malou Suarez is a 58 y.o. male with a medical diagnosis of Left upper quadrant pain.  He presents with HOB elevated and on IV. Performance deficits affecting function are weakness, impaired endurance, pain, impaired balance, impaired self care skills.     Rehab Prognosis:  Good; patient would benefit from acute skilled OT services to address these deficits and reach maximum level of function.       Plan:     Patient to be seen 3 x/week to address the above listed problems via self-care/home management, therapeutic activities, therapeutic exercises  Plan of Care Expires: 01/21/24  Plan of Care Reviewed with: patient, family    Subjective     Chief Complaint: mid abdominal pain   Patient/Family Comments/goals: to return home with family   Pain/Comfort:  Pain Rating 1: other (see comments) (he just had a pain pill, but he did not rate)  Location 1: abdomen  Pain Addressed 1: Reposition, Distraction, Cessation of Activity, Nurse notified, Pre-medicate for activity    Objective:     Communicated with: RNBrigette, prior to session.  Patient found HOB elevated with telemetry, peripheral IV, bed alarm, oxygen, ANDREEA drain, pulse ox (continuous) upon OT entry to room.    General Precautions: Standard, fall, respiratory    Orthopedic Precautions:N/A  Braces: N/A  Respiratory Status: Room air     Occupational Performance:     Bed Mobility:    Patient completed Scooting/Bridging with modified independence     Functional Mobility/Transfers:  Patient did not want to get OOB     Activities of Daily Living:  Upper Body Dressing: stand by assistance to don outer gown       [facility] 6 Click ADL: 18    Treatment & Education:  Occupational  therapy educated patient and son re: lifting no more than 5-10# due to abdominal precautions and prevent incision from getting wet.   Occupational therapy educated patient re: UB therex with no resistance: 10 reps x 2 sets each:  1) overhead shoulder exercises  2) chest press  3) scapula protraction/retraction     Patient left HOB elevated with all lines intact, call button in reach, and RN  notified    GOALS:   Multidisciplinary Problems       Occupational Therapy Goals          Problem: Occupational Therapy    Goal Priority Disciplines Outcome Interventions   Occupational Therapy Goal     OT, PT/OT Ongoing, Progressing    Description: Goals to be met by: 01/21/24     Patient will increase functional independence with ADLs by performing:    UE Dressing with Leslie.  LE Dressing with Set-up Assistance.  Grooming while seated at sink with Set-up Assistance.  Toileting from bedside commode with Set-up Assistance for hygiene and clothing management.   Sitting at edge of bed x 30-60  minutes with Supervision.  Toilet transfer to bedside commode with Supervision.  Upper extremity exercise program x10  reps per handout, with supervision.                         Time Tracking:     OT Date of Treatment: 01/17/24  OT Start Time: 1540  OT Stop Time: 1549  OT Total Time (min): 9 min    Billable Minutes:Therapeutic Exercise 9     OT/SHELBY: OT          1/17/2024

## 2024-01-17 NOTE — PT/OT/SLP PROGRESS
Physical Therapy Treatment    Patient Name:  Malou Suarez   MRN:  [id number]    Recommendations:     Discharge Recommendations: Low Intensity Therapy  Discharge Equipment Recommendations: none   Barriers to discharge: None    Assessment:     Malou Suarez is a 58 y.o. male admitted with a medical diagnosis of Left upper quadrant pain.  He presents with the following impairments/functional limitations: weakness, impaired endurance, gait instability, pain, impaired cardiopulmonary response to activity, decreased safety awareness .    Rehab Prognosis: Good; patient would benefit from acute skilled PT services to address these deficits and reach maximum level of function.    Recent Surgery: Procedure(s) (LRB):  LAPAROSCOPIC CHOLECYSTECTOMY (N/A) 2 Days Post-Op    Plan:     During this hospitalization, patient to be seen 4 x/week to address the identified rehab impairments via gait training, therapeutic activities, therapeutic exercises and progress toward the following goals:    Plan of Care Expires:  01/28/24    Subjective     Chief Complaint: having too many loose BMs   Patient/Family Comments/goals: pt is pleasant and agreeable to therapy   Pain/Comfort:  Pain Rating 1: 2/10  Location 1: abdomen  Pain Addressed 1: Pre-medicate for activity, Nurse notified  Pain Rating Post-Intervention 1: 2/10      Objective:     Communicated with nurse prior to session.  Patient found HOB elevated with ANDREEA drain, peripheral IV, telemetry upon PT entry to room.     General Precautions: Standard, fall  Orthopedic Precautions: N/A  Braces: N/A  Respiratory Status: Room air   SpO2 : 89%-98 % on RA on exertions . HR stable    Functional Mobility:  Bed Mobility: educated pt on log roll technique for bed mobility)     Rolling Right: supervision  Scooting: supervision  Supine to Sit: supervision  Transfers:     Sit to Stand: from bed  supervision with no AD  Gait: pt ambulated 500 ft with no AD, SBA. Noted with decreased karol,decreased step length  , no LOB . VC's for safety technique and pursed lip breathing technique. Pt denied SOB or dizziness .   Balance:  good       AM-PAC 6 CLICK MOBILITY  Turning over in bed (including adjusting bedclothes, sheets and blankets)?: 4  Sitting down on and standing up from a chair with arms (e.g., wheelchair, bedside commode, etc.): 4  Moving from lying on back to sitting on the side of the bed?: 4  Moving to and from a bed to a chair (including a wheelchair)?: 4  Need to walk in hospital room?: 4  Climbing 3-5 steps with a railing?: 3  Basic Mobility Total Score: 23       Treatment & Education:  Instructed pt to perform seated BLE x 20 reps : AP.   Educated pt about abdominal precautions and benefit of ambulation . Pt is safe to ambulate with son or staff .   Patient left up in chair with all lines intact, call button in reach, nurse  notified, and son  present..    GOALS:   Multidisciplinary Problems       Physical Therapy Goals          Problem: Physical Therapy    Goal Priority Disciplines Outcome Goal Variances Interventions   Physical Therapy Goal     PT, PT/OT Ongoing, Progressing     Description: Goals to be met by:  2024    Patient will increase functional independence with mobility by performin. Supine to sit with Modified [place]  2. Sit to supine with Modified [place]  3. Sit to stand transfer with Modified [place]  4. Gait  x 400 feet with Modified [place] using Rolling Walker.    Recommend: Low Intensity Therapy at time of discharge to home with family.                             Time Tracking:     PT Received On: 24  PT Start Time: 1332     PT Stop Time: 1355  PT Total Time (min): 23 min     Billable Minutes: Gait Training 23    Treatment Type: Treatment  PT/PTA: PTA     Number of PTA visits since last PT visit: 2     2024

## 2024-01-17 NOTE — NURSING
[facility], [facility] [place]  Nurses Note -- 4 Eyes      1/17/2024       Skin assessed on: Q Shift      [x] No Pressure Injuries Present    [x]Prevention Measures Documented    [] Yes LDA  for Pressure Injury Previously documented     [] Yes New Pressure Injury Discovered   [] LDA for New Pressure Injury Added      Attending RN:  Brigette Colunga LPN     Second RN:  PAIGE Faith

## 2024-01-17 NOTE — NURSING
Pt resting in bed, able to ambulate to the restroom with assist x1.  IV infusing fluids as ordered.  BG monitored, insulin given as ordered.  Dressing to abdomen intact, no drainage noted.  ANDREEA drain to right lateral chest intact to suction.  PRN pain medication given for pain with relief.  SCD's applied to the pt.  No acute distress noted, tele box monitored.    [facility], [facility]  Nurses Note -- 4 Eyes      1/16/2024       Skin assessed on: Q Shift      [x] No Pressure Injuries Present    [x]Prevention Measures Documented    [] Yes LDA  for Pressure Injury Previously documented     [] Yes New Pressure Injury Discovered   [] LDA for New Pressure Injury Added      Attending RN:  Vianney Zelaya RN     Second RN:  OBDULIA Martinez

## 2024-01-17 NOTE — SUBJECTIVE & OBJECTIVE
Past Medical History:   Diagnosis Date    Diabetes mellitus     Pulmonary emphysema 2024       Past Surgical History:   Procedure Laterality Date    ROBOT-ASSISTED CHOLECYSTECTOMY N/A 1/15/2024    Procedure: LAPAROSCOPIC CHOLECYSTECTOMY;  Surgeon: Maury Mack MD;  Location: [facility];  Service: General;  Laterality: N/A;       Review of patient's allergies indicates:  No Known Allergies    No current facility-administered medications on file prior to encounter.     Current Outpatient Medications on File Prior to Encounter   Medication Sig    alogliptin-pioglitazone 12.5-15 mg Tab Take by mouth once daily.    aspirin (ECOTRIN) 81 MG EC tablet Take 1 tablet (81 mg total) by mouth once daily.    atorvastatin (LIPITOR) 40 MG tablet Take 1 tablet (40 mg total) by mouth once daily.    BASAGLAR KWIKPEN U-100 INSULIN glargine 100 units/mL SubQ pen SMARTSI Unit(s) SUB-Q Every Night    empagliflozin 10 mg Tab Take 10 mg by mouth once daily.    ergocalciferol (ERGOCALCIFEROL) 50,000 unit Cap Take 50,000 Units by mouth every 7 days.    insulin aspart protamine-insulin aspart (NOVOLOG 70/30) 100 unit/mL (70-30) InPn pen Inject into the skin 2 (two) times daily before meals.    insulin glargine (LANTUS) 100 unit/mL injection Inject into the skin every evening.    JANUMET -1,000 mg TM24 Take 1 tablet by mouth every morning.    losartan (COZAAR) 25 MG tablet Take 25 mg by mouth.    traMADoL (ULTRAM) 50 mg tablet Take 1 tablet (50 mg total) by mouth every 8 (eight) hours as needed for Pain.    VEMLIDY 25 mg Tab Take 1 tablet by mouth.     Family History    None       Tobacco Use    Smoking status: Former    Smokeless tobacco: Not on file   Substance and Sexual Activity    Alcohol use: No    Drug use: No    Sexual activity: Not on file     Review of Systems   Constitutional:  Positive for appetite change.   HENT: Negative.     Respiratory: Negative.     Cardiovascular:  Positive for chest pain.        Left-sided  squeezing/sharp chest pain   Gastrointestinal:  Positive for abdominal pain.   Genitourinary: Negative.    Musculoskeletal:  Positive for back pain and myalgias.        Mostly on left thoracic spine   Neurological: Negative.    Psychiatric/Behavioral: Negative.       Objective:     Vital Signs (Most Recent):  Temp: 97.7 °F (36.5 °C) (01/17/24 0715)  Pulse: 64 (01/17/24 0715)  Resp: 19 (01/17/24 0715)  BP: 125/61 (01/17/24 0715)  SpO2: 95 % (01/17/24 0715) Vital Signs (24h Range):  Temp:  [97.4 °F (36.3 °C)-98.6 °F (37 °C)] 97.7 °F (36.5 °C)  Pulse:  [64-77] 64  Resp:  [18-19] 19  SpO2:  [90 %-98 %] 95 %  BP: (104-129)/(57-68) 125/61     Weight: 70.6 kg (155 lb 10.3 oz)  Body mass index is 25.9 kg/m².     Physical Exam  Constitutional:       General: He is in acute distress.      Appearance: Normal appearance.      Comments: Patient visibly in pain, moving around in the bed    HENT:      Head: Normocephalic and atraumatic.      Mouth/Throat:      Mouth: Mucous membranes are moist.   Eyes:      Extraocular Movements: Extraocular movements intact.   Cardiovascular:      Rate and Rhythm: Regular rhythm. Bradycardia present.      Pulses: Normal pulses.      Comments: EKG shows sinus bradycardia, no STEMI at 58 bpm  Pulmonary:      Effort: Pulmonary effort is normal.   Abdominal:      General: Abdomen is flat.      Tenderness: There is abdominal tenderness. There is no right CVA tenderness, left CVA tenderness or guarding.      Comments: Left upper quadrant.  Negative Velasquez's sign   Musculoskeletal:      Right lower leg: No edema.      Left lower leg: No edema.      Comments: Left-sided chest pain that radiates to the left paraspinal musculature of the thoracic spine. + TTP, no erythema, no ecchymosis   Skin:     General: Skin is warm.   Neurological:      General: No focal deficit present.      Mental Status: He is alert and oriented to person, place, and time. Mental status is at baseline.   Psychiatric:         Mood  "and Affect: Mood normal.         Behavior: Behavior normal.         Thought Content: Thought content normal.             Significant Labs: All pertinent labs within the past 24 hours have been reviewed.  Bilirubin:   Recent Labs   Lab 01/13/24  1809 01/14/24  0530 01/15/24  0523 01/16/24  0333 01/17/24  0452   BILITOT 0.6 1.1* 1.9* 1.5* 1.0       BMP:   Recent Labs   Lab 01/16/24 0333 01/17/24  0452   * 181*    135*   K 4.7 3.9    103   CO2 26 26   BUN 23* 20   CREATININE 0.7 0.6   CALCIUM 8.4* 8.5*   MG 2.1  --        CBC:   Recent Labs   Lab 01/16/24 0333 01/16/24  0811 01/17/24  0452   WBC 7.32 8.11 7.71   HGB 12.7* 12.0* 12.2*   HCT 38.3* 36.0* 36.1*    172 208       CMP:   Recent Labs   Lab 01/16/24 0333 01/17/24  0452    135*   K 4.7 3.9    103   CO2 26 26   * 181*   BUN 23* 20   CREATININE 0.7 0.6   CALCIUM 8.4* 8.5*   PROT 6.0 5.9*   ALBUMIN 2.3* 2.2*   BILITOT 1.5* 1.0   ALKPHOS 28* 30*   AST 86* 113*   ALT 89* 132*   ANIONGAP 7* 6*       Lipase:   No results for input(s): "LIPASE" in the last 48 hours.    Magnesium:   Recent Labs   Lab 01/16/24 0333   MG 2.1       POCT Glucose:   Recent Labs   Lab 01/16/24  1546 01/16/24  1942 01/17/24  0716   POCTGLUCOSE 223* 227* 155*       Troponin:   No results for input(s): "TROPONINI", "TROPONINIHS" in the last 48 hours.    Urine Studies:   No results for input(s): "COLORU", "APPEARANCEUA", "PHUR", "SPECGRAV", "PROTEINUA", "GLUCUA", "KETONESU", "BILIRUBINUA", "OCCULTUA", "NITRITE", "UROBILINOGEN", "LEUKOCYTESUR", "RBCUA", "WBCUA", "BACTERIA", "SQUAMEPITHEL", "HYALINECASTS" in the last 48 hours.    Invalid input(s): "WRIGHTSUR"    Significant Imaging: I have reviewed all pertinent imaging results/findings within the past 24 hours.  Imaging Results              X-Ray Thoracic Spine AP Lateral (Final result)  Result time 01/14/24 10:17:53      Final result by Ruben Collado MD (01/14/24 10:17:53)                   " Impression:      As above.      Electronically signed by: Ruben Collado MD  Date:    01/14/2024  Time:    10:17               Narrative:    EXAMINATION:  XR THORACIC SPINE AP LATERAL    CLINICAL HISTORY:  back pain;    TECHNIQUE:  AP and lateral views of the thoracic spine.    COMPARISON:  None.    FINDINGS:  Alignment: Alignment is maintained.    Vertebrae: Vertebral body heights are maintained.  No suspicious appearing lytic or blastic lesions.    Discs and facets: Disc heights are maintained.  Multilevel marginal osteophytes noted.    Miscellaneous: No additional findings.                                       X-Ray Lumbar Spine Ap And Lateral (Final result)  Result time 01/14/24 10:19:51      Final result by Ruben Collado MD (01/14/24 10:19:51)                   Impression:      As above.      Electronically signed by: Ruben Collado MD  Date:    01/14/2024  Time:    10:19               Narrative:    EXAMINATION:  XR LUMBAR SPINE AP AND LATERAL    CLINICAL HISTORY:  back pain;    TECHNIQUE:  AP, lateral and spot images were performed of the lumbar spine.    COMPARISON:  None    FINDINGS:  Alignment: Minimal levoconvex curvature of the lumbar spine.  Anteroposterior alignment is maintained.    Vertebrae: Vertebral body heights are maintained.  No suspicious appearing lytic or blastic lesions.    Discs and facets: Disc heights are maintained.  Small marginal osteophytes noted.  There is lower lumbar facet arthropathy.    Miscellaneous: Aortic calcification noted.                                       CTA Chest Non-Coronary (PE Studies) (Final result)  Result time 01/13/24 20:26:03      Final result by Giovanni Mckinney MD (01/13/24 20:26:03)                   Impression:      1. No evidence of PE.  No acute intrathoracic abnormalities identified.  2. Mild to moderate pulmonary emphysema.      Electronically signed by: Giovanni Mckinney MD  Date:    01/13/2024  Time:    20:26               Narrative:     EXAMINATION:  CTA CHEST NON CORONARY (PE STUDIES)    CLINICAL HISTORY:  Pulmonary embolism (PE) suspected, positive D-dimer;    TECHNIQUE:  Low dose axial images, sagittal and coronal reformations were obtained from the thoracic inlet to the lung bases following the IV administration of 75 mL of Omnipaque 350.  Contrast timing was optimized to evaluate the pulmonary arteries.  MIP images were performed.    COMPARISON:  None    FINDINGS:  Structures at the base of the neck are unremarkable.  Aorta is non-aneurysmal.  The heart is normal in size without pericardial effusion.  Mild coronary artery calcification is seen.  No intraluminal filling defects within the pulmonary arteries to suggest pulmonary thromboembolism.   There is no evidence of mediastinal, axillary, or hilar lymph node enlargement.  The esophagus is unremarkable along its course.    The trachea and bronchi are patent.  The lungs are symmetrically expanded.  Mild to moderate emphysematous changes are seen within the upper lobes.  Lungs show no consolidation, pleural effusion, pulmonary hemorrhage, or infarction.    The visualized abdominal structures show no acute abnormalities.  Multiple remote right rib fracture deformities are seen.  Extrathoracic soft tissues are unremarkable.

## 2024-01-17 NOTE — PROGRESS NOTES
[facility] Medicine  Progress Note    Patient Name: Malou Suarez  MRN: [id number]  Patient Class: IP- Inpatient   Admission Date: 1/13/2024  Length of Stay: 1 days  Attending Physician: Haresh De Oliveira, *  Primary Care Provider: Fred Dia MD        Subjective:     Principal Problem:Left upper quadrant pain        HPI:  Malou Suarez is a 58 y.o. with a pmh of DM and HTN presents to the hospital with complaints of left sided chest pain with radiation to the back on the left. Patient was recently admitted with left upper quadrant pain associated with n/v and seen by both Surgery and Cardiology. Surgery cleared patient as HIDA scan showed no acute cholecystitis.  Cardiology also cleared patient for atypical chest pain with follow-up stress test outpatient. He was discharged on 01/13/2024 from the hospital and returned the same day to the ED similar complaints. He states that he was pain-free upon discharge but pain returned at home which prompted his return.  He describes pain as squeezing and sharp on the left side of his chest which radiates to his left back mostly in the thoracic region. Patient states that he is unable to tolerate anything by mouth due to his pain. Patient took the pain medicine given to him on discharge without much relief. He denies any alleviating or exacerbating factors. Patient denies headache, fever, blurry vision, nausea, vomiting, diarrhea, melena, hematemesis, hematochezia, hematuria, or any other associated symptoms. Of note he states that he did not take his blood pressure and diabetes medications prior to arrival.     service was used due to language barrier.  ID# [id number]    In the ED: Patient is afebrile without leukocytosis, hypertensive on presentation 189/75, 98 % O2 sat on RA, CBC unremarkable, D-dimer 0.5, magnesium 1.5, ALP 41, troponin normal, POCT glucose 250, CTA chest (1) no evidence of PE no acute intrathoracic abnormality 2) mild  to moderate pulmonary emphysema, EKG shows sinus bradycardia, no STEMI at 58 bpm. Patient given cyclobenzaprine 10 mg, ketorolac 15 mg IV, morphine 4 mg IV x2 in the ED.    Case discussed with ED provider.    Malou Suarez we will be placed under observation for further medical management of his intractable pain.    Overview/Hospital Course:  Malou Suarez is a 58 y.o. with a pmh of DM and HTN presents to the hospital with complaints of left sided chest pain with radiation to the back on the left. Patient was recently admitted with left upper quadrant pain associated with n/v and seen by both Surgery and Cardiology. Surgery cleared patient as HIDA scan showed no acute cholecystitis.  Cardiology also cleared patient for atypical chest pain with follow-up stress test outpatient. He was discharged on 01/13/2024 from the hospital and returned the same day to the ED similar complaints.   In the ED: Patient is afebrile without leukocytosis, hypertensive on presentation 189/75, 98 % O2 sat on RA, CBC unremarkable, D-dimer 0.5, magnesium 1.5, ALP 41, troponin normal, POCT glucose 250, CTA chest (1) no evidence of PE no acute intrathoracic abnormality 2) mild to moderate pulmonary emphysema, EKG shows sinus bradycardia, no STEMI at 58 bpm. Patient given cyclobenzaprine 10 mg, ketorolac 15 mg IV, morphine 4 mg IV x2 in the ED.  CTA show,Gallbladder markedly thickened and enlarged, with substantially worsened inflammatory change when compared to the 01/12/2024 CT. Redemonstrated cholelithiasis, with notable stone in the cystic duct unchanged in position. Extensive intraluminal air now noted throughout the gallbladder lumen, possibly on the basis of emphysematous cholecystitis, abscess formation, and/or fistulization with adjoining gastrointestinal tract. Question few small foci of extraluminal air lateral aspect of the gallbladder, which could indicate perforation. Additionally, lobulated focus of increased attenuation measuring the  order of 22 mm in this region could indicate hemorrhage.   S/P lap. Ananya on 1.15.24,advancing diet gradually,PT,OT,remain son IV Zosyn,  PT,OT is consulted.    Past Medical History:   Diagnosis Date    Diabetes mellitus     Pulmonary emphysema 2024       Past Surgical History:   Procedure Laterality Date    ROBOT-ASSISTED CHOLECYSTECTOMY N/A 1/15/2024    Procedure: LAPAROSCOPIC CHOLECYSTECTOMY;  Surgeon: Maury Mack MD;  Location: [facility];  Service: General;  Laterality: N/A;       Review of patient's allergies indicates:  No Known Allergies    No current facility-administered medications on file prior to encounter.     Current Outpatient Medications on File Prior to Encounter   Medication Sig    alogliptin-pioglitazone 12.5-15 mg Tab Take by mouth once daily.    aspirin (ECOTRIN) 81 MG EC tablet Take 1 tablet (81 mg total) by mouth once daily.    atorvastatin (LIPITOR) 40 MG tablet Take 1 tablet (40 mg total) by mouth once daily.    BASAGLAR KWIKPEN U-100 INSULIN glargine 100 units/mL SubQ pen SMARTSI Unit(s) SUB-Q Every Night    empagliflozin 10 mg Tab Take 10 mg by mouth once daily.    ergocalciferol (ERGOCALCIFEROL) 50,000 unit Cap Take 50,000 Units by mouth every 7 days.    insulin aspart protamine-insulin aspart (NOVOLOG 70/30) 100 unit/mL (70-30) InPn pen Inject into the skin 2 (two) times daily before meals.    insulin glargine (LANTUS) 100 unit/mL injection Inject into the skin every evening.    JANUMET -1,000 mg TM24 Take 1 tablet by mouth every morning.    losartan (COZAAR) 25 MG tablet Take 25 mg by mouth.    traMADoL (ULTRAM) 50 mg tablet Take 1 tablet (50 mg total) by mouth every 8 (eight) hours as needed for Pain.    VEMLIDY 25 mg Tab Take 1 tablet by mouth.     Family History    None       Tobacco Use    Smoking status: Former    Smokeless tobacco: Not on file   Substance and Sexual Activity    Alcohol use: No    Drug use: No    Sexual activity: Not on file     Review of  Systems   Constitutional:  Positive for appetite change.   HENT: Negative.     Respiratory: Negative.     Cardiovascular:  Positive for chest pain.        Left-sided squeezing/sharp chest pain   Gastrointestinal:  Positive for abdominal pain.   Genitourinary: Negative.    Musculoskeletal:  Positive for back pain and myalgias.        Mostly on left thoracic spine   Neurological: Negative.    Psychiatric/Behavioral: Negative.       Objective:     Vital Signs (Most Recent):  Temp: 97.7 °F (36.5 °C) (01/17/24 0715)  Pulse: 64 (01/17/24 0715)  Resp: 19 (01/17/24 0715)  BP: 125/61 (01/17/24 0715)  SpO2: 95 % (01/17/24 0715) Vital Signs (24h Range):  Temp:  [97.4 °F (36.3 °C)-98.6 °F (37 °C)] 97.7 °F (36.5 °C)  Pulse:  [64-77] 64  Resp:  [18-19] 19  SpO2:  [90 %-98 %] 95 %  BP: (104-129)/(57-68) 125/61     Weight: 70.6 kg (155 lb 10.3 oz)  Body mass index is 25.9 kg/m².     Physical Exam  Constitutional:       General: He is in acute distress.      Appearance: Normal appearance.      Comments: Patient visibly in pain, moving around in the bed    HENT:      Head: Normocephalic and atraumatic.      Mouth/Throat:      Mouth: Mucous membranes are moist.   Eyes:      Extraocular Movements: Extraocular movements intact.   Cardiovascular:      Rate and Rhythm: Regular rhythm. Bradycardia present.      Pulses: Normal pulses.      Comments: EKG shows sinus bradycardia, no STEMI at 58 bpm  Pulmonary:      Effort: Pulmonary effort is normal.   Abdominal:      General: Abdomen is flat.      Tenderness: There is abdominal tenderness. There is no right CVA tenderness, left CVA tenderness or guarding.      Comments: Left upper quadrant.  Negative Velasquez's sign   Musculoskeletal:      Right lower leg: No edema.      Left lower leg: No edema.      Comments: Left-sided chest pain that radiates to the left paraspinal musculature of the thoracic spine. + TTP, no erythema, no ecchymosis   Skin:     General: Skin is warm.   Neurological:       "General: No focal deficit present.      Mental Status: He is alert and oriented to person, place, and time. Mental status is at baseline.   Psychiatric:         Mood and Affect: Mood normal.         Behavior: Behavior normal.         Thought Content: Thought content normal.             Significant Labs: All pertinent labs within the past 24 hours have been reviewed.  Bilirubin:   Recent Labs   Lab 01/13/24  1809 01/14/24  0530 01/15/24  0523 01/16/24  0333 01/17/24  0452   BILITOT 0.6 1.1* 1.9* 1.5* 1.0       BMP:   Recent Labs   Lab 01/16/24  0333 01/17/24  0452   * 181*    135*   K 4.7 3.9    103   CO2 26 26   BUN 23* 20   CREATININE 0.7 0.6   CALCIUM 8.4* 8.5*   MG 2.1  --        CBC:   Recent Labs   Lab 01/16/24  0333 01/16/24  0811 01/17/24  0452   WBC 7.32 8.11 7.71   HGB 12.7* 12.0* 12.2*   HCT 38.3* 36.0* 36.1*    172 208       CMP:   Recent Labs   Lab 01/16/24  0333 01/17/24  0452    135*   K 4.7 3.9    103   CO2 26 26   * 181*   BUN 23* 20   CREATININE 0.7 0.6   CALCIUM 8.4* 8.5*   PROT 6.0 5.9*   ALBUMIN 2.3* 2.2*   BILITOT 1.5* 1.0   ALKPHOS 28* 30*   AST 86* 113*   ALT 89* 132*   ANIONGAP 7* 6*       Lipase:   No results for input(s): "LIPASE" in the last 48 hours.    Magnesium:   Recent Labs   Lab 01/16/24  0333   MG 2.1       POCT Glucose:   Recent Labs   Lab 01/16/24  1546 01/16/24  1942 01/17/24  0716   POCTGLUCOSE 223* 227* 155*       Troponin:   No results for input(s): "TROPONINI", "TROPONINIHS" in the last 48 hours.    Urine Studies:   No results for input(s): "COLORU", "APPEARANCEUA", "PHUR", "SPECGRAV", "PROTEINUA", "GLUCUA", "KETONESU", "BILIRUBINUA", "OCCULTUA", "NITRITE", "UROBILINOGEN", "LEUKOCYTESUR", "RBCUA", "WBCUA", "BACTERIA", "SQUAMEPITHEL", "HYALINECASTS" in the last 48 hours.    Invalid input(s): "WRIGHTSUR"    Significant Imaging: I have reviewed all pertinent imaging results/findings within the past 24 hours.  Imaging Results         "      X-Ray Thoracic Spine AP Lateral (Final result)  Result time 01/14/24 10:17:53      Final result by Ruben Collado MD (01/14/24 10:17:53)                   Impression:      As above.      Electronically signed by: Ruben Collado MD  Date:    01/14/2024  Time:    10:17               Narrative:    EXAMINATION:  XR THORACIC SPINE AP LATERAL    CLINICAL HISTORY:  back pain;    TECHNIQUE:  AP and lateral views of the thoracic spine.    COMPARISON:  None.    FINDINGS:  Alignment: Alignment is maintained.    Vertebrae: Vertebral body heights are maintained.  No suspicious appearing lytic or blastic lesions.    Discs and facets: Disc heights are maintained.  Multilevel marginal osteophytes noted.    Miscellaneous: No additional findings.                                       X-Ray Lumbar Spine Ap And Lateral (Final result)  Result time 01/14/24 10:19:51      Final result by Ruben Collado MD (01/14/24 10:19:51)                   Impression:      As above.      Electronically signed by: Ruben Collado MD  Date:    01/14/2024  Time:    10:19               Narrative:    EXAMINATION:  XR LUMBAR SPINE AP AND LATERAL    CLINICAL HISTORY:  back pain;    TECHNIQUE:  AP, lateral and spot images were performed of the lumbar spine.    COMPARISON:  None    FINDINGS:  Alignment: Minimal levoconvex curvature of the lumbar spine.  Anteroposterior alignment is maintained.    Vertebrae: Vertebral body heights are maintained.  No suspicious appearing lytic or blastic lesions.    Discs and facets: Disc heights are maintained.  Small marginal osteophytes noted.  There is lower lumbar facet arthropathy.    Miscellaneous: Aortic calcification noted.                                       CTA Chest Non-Coronary (PE Studies) (Final result)  Result time 01/13/24 20:26:03      Final result by Giovanni Mckinney MD (01/13/24 20:26:03)                   Impression:      1. No evidence of PE.  No acute intrathoracic abnormalities identified.  2.  Mild to moderate pulmonary emphysema.      Electronically signed by: Giovanni Mckinney MD  Date:    01/13/2024  Time:    20:26               Narrative:    EXAMINATION:  CTA CHEST NON CORONARY (PE STUDIES)    CLINICAL HISTORY:  Pulmonary embolism (PE) suspected, positive D-dimer;    TECHNIQUE:  Low dose axial images, sagittal and coronal reformations were obtained from the thoracic inlet to the lung bases following the IV administration of 75 mL of Omnipaque 350.  Contrast timing was optimized to evaluate the pulmonary arteries.  MIP images were performed.    COMPARISON:  None    FINDINGS:  Structures at the base of the neck are unremarkable.  Aorta is non-aneurysmal.  The heart is normal in size without pericardial effusion.  Mild coronary artery calcification is seen.  No intraluminal filling defects within the pulmonary arteries to suggest pulmonary thromboembolism.   There is no evidence of mediastinal, axillary, or hilar lymph node enlargement.  The esophagus is unremarkable along its course.    The trachea and bronchi are patent.  The lungs are symmetrically expanded.  Mild to moderate emphysematous changes are seen within the upper lobes.  Lungs show no consolidation, pleural effusion, pulmonary hemorrhage, or infarction.    The visualized abdominal structures show no acute abnormalities.  Multiple remote right rib fracture deformities are seen.  Extrathoracic soft tissues are unremarkable.                                      Assessment/Plan:      * Left upper quadrant pain  -Patient returns back to the hospital after being discharged earlier today for LUQ and left-sided chest wall pain radiating to his back. Patient denies nausea, vomiting, fever, or shortness a breath  -Patient had extensive workup done prior to discharge by Surgery and Cardiology.  -CT abdomen showed possible cholecystitis and Surgery was consulted.  HIDA scan done and showed normal filling of gallbladder with no evidence acute  cholecystitis.  -Cardiology consulted for left-sided atypical chest pain. Echo with WMA and stress test recommended outpatient.  -Patient was cleared from both Surgery and Cardiology and discharged with outpatient followups  -Presentation today:  Acute cholecystitis ruled out, EKG without any ST changes, likely combination of atypical chest pain versus musculoskeletal mainly thoracic spine issue    Plan:  CTA show,Gallbladder markedly thickened and enlarged, with substantially worsened inflammatory change when compared to the 01/12/2024 CT. Redemonstrated cholelithiasis, with notable stone in the cystic duct unchanged in position. Extensive intraluminal air now noted throughout the gallbladder lumen, possibly on the basis of emphysematous cholecystitis, abscess formation, and/or fistulization with adjoining gastrointestinal tract. Question few small foci of extraluminal air lateral aspect of the gallbladder, which could indicate perforation. Additionally, lobulated focus of increased attenuation measuring the order of 22 mm in this region could indicate hemorrhage.   S/P lap. Ananya on 1.15.24,advancing diet gradually,PT,OT,remain son IV Zosyn,      Cholecystitis, acute  S/P lap. Ananya,on IV zosyn.      Pulmonary emphysema  F/u Pulm as well for PFTs    Elevated d-dimer  -Patient presents with elevated 189/75, with normal respiration and pulse, slightly short of breath due to pain found to have D-dimer 0.50 (right at the cutoff).  -CTA chest ordered and showed no evidence of PE or acute intrathoracic abnormalities however it did show mild to moderate pulmonary emphysema  -PE ruled out    Hyperlipidemia  Continue home statin therapy    Acute left-sided thoracic back pain  -see above  -CT thoracic spine and x-ray ordered  -Continue p.r.n. analgesics and lidocaine patches ordered  -Continue to monitor    Type 2 diabetes mellitus  Patient's FSGs are controlled on current medication regimen.  Last A1c reviewed- No results  "found for: "LABA1C", "HGBA1C"  Most recent fingerstick glucose reviewed-   Recent Labs   Lab 01/13/24  0728 01/13/24  1125 01/13/24  1805   POCTGLUCOSE 91 185* 250*     Current correctional scale  Medium  Maintain anti-hyperglycemic dose as follows-   Antihyperglycemics (From admission, onward)      Start     Stop Route Frequency Ordered    01/14/24 0042  insulin aspart U-100 pen 0-10 Units         -- SubQ Every 6 hours PRN 01/13/24 2342          SSI  Levemir for basal insulin  Accu-Cheks  Hold Oral hypoglycemics while patient is in the hospital.    Hypertension  Chronic, controlled. Latest blood pressure and vitals reviewed-     Temp:  [97.8 °F (36.6 °C)-98.3 °F (36.8 °C)]   Pulse:  [57-95]   Resp:  [14-28]   BP: (114-189)/(56-75)   SpO2:  [93 %-99 %] .   Home meds for hypertension were reviewed and noted below.   Hypertension Medications               losartan (COZAAR) 25 MG tablet Take 25 mg by mouth.          While in the hospital, will manage blood pressure as follows; Continue home antihypertensive regimen    Will utilize p.r.n. blood pressure medication only if patient's blood pressure greater than 180/110 and he develops symptoms such as worsening chest pain or shortness of breath.    Positive colorectal cancer screening using Cologuard test  Per chart review  Patient with positive Cologuard test  CEA ordered and pending  Recommendation was for GI follow-up and colonoscopy outpatient    Other chest pain  Patient with left-sided atypical chest pain. Seen by Dr. Pavon (Cardiology).  Echo showed no wall abnormalities with normal EF  -Troponin normal, EKG with sinus bradycardia, patient denies shortness of breath, diaphoresis, weakness, nausea/vomiting  -Stress test was recommended outpatient if patient was not willing to stay in-house until Tuesday  -Continue home aspirin/statin  -Cardiology consulted if stress test can be done sooner      VTE Risk Mitigation (From admission, onward)           Ordered     IP VTE " LOW RISK PATIENT  Once         01/13/24 2251     Place sequential compression device  Until discontinued         01/13/24 2251                    Discharge Planning   ADRIA:      Code Status: Full Code   Is the patient medically ready for discharge?:     Reason for patient still in hospital (select all that apply): Patient trending condition  Discharge Plan A: Home with family                  Haresh De Oliveira MD  Department of Hospital Medicine   [facility]

## 2024-01-17 NOTE — PROGRESS NOTES
[facility] Surg  General Surgery  Progress Note    Subjective:     History of Present Illness:  Malou Suarez is a 58 y.o. with a pmh of DM and HTN who represents to the hospital with complaints of left sided chest pain with radiation to the back on the left. He was recently admitted for similar complaints. He underwent extensive workup. There were some CT findings initially concerning for cholecystitis; however, his symptoms were not consistent with this. HIDA scan showed filling of the gallbladder which ruled out cholecystitis. HE had some ischemic changes on EKG and cardiology workup was otherwise essentially normal. His symptoms improved significantly and he was discharged. He represented the following day with worsening pain. He now has worsening bilateral chest pain worsened with deep breathing. He also has LUQ pain, RUQ pain, back pain and was found to have a compression fracture of L1. He continues to have no fevers, WBC is normal. Bilirubin was 1.1 yesterday and 1.9 today. He denies nausea/vomiting and had a bowel movement yesterday. He is hemodynamically stable.     Post-Op Info:  Procedure(s) (LRB):  LAPAROSCOPIC CHOLECYSTECTOMY (N/A)   2 Days Post-Op     Interval History:  Doing fairly well postoperatively.  Pain is controlled.  Drain output is serous.  Lab values normalizing other than mild transaminitis.  Multiple bowel movements throughout the night.    Medications:  Continuous Infusions:  Scheduled Meds:   acetaminophen  1,000 mg Oral Q8H    aspirin  81 mg Oral Daily    atorvastatin  40 mg Oral Daily    gabapentin  300 mg Oral TID    insulin detemir U-100  10 Units Subcutaneous QHS    piperacillin-tazobactam (Zosyn) IV (PEDS and ADULTS) (extended infusion is not appropriate)  4.5 g Intravenous Q8H    polyethylene glycol  17 g Oral BID     PRN Meds:bisacodyL, dextrose 10%, dextrose 10%, dextrose 10%, dextrose 10%, glucagon (human recombinant), glucose, glucose, HYDROmorphone, HYDROmorphone, insulin  aspart U-100, loperamide, melatonin, naloxone, ondansetron, ondansetron, oxyCODONE, oxyCODONE, polyethylene glycol, prochlorperazine, prochlorperazine, sodium chloride 0.9%, sodium chloride 0.9%     Review of patient's allergies indicates:  No Known Allergies  Objective:     Vital Signs (Most Recent):  Temp: 97 °F (36.1 °C) (01/17/24 1115)  Pulse: 65 (01/17/24 1115)  Resp: 18 (01/17/24 1131)  BP: (!) 148/72 (01/17/24 1115)  SpO2: (!) 93 % (01/17/24 1115) Vital Signs (24h Range):  Temp:  [97 °F (36.1 °C)-98.6 °F (37 °C)] 97 °F (36.1 °C)  Pulse:  [64-77] 65  Resp:  [17-19] 18  SpO2:  [90 %-98 %] 93 %  BP: (110-148)/(60-72) 148/72     Weight: 70.6 kg (155 lb 10.3 oz)  Body mass index is 25.9 kg/m².    Intake/Output - Last 3 Shifts         01/15 0700  01/16 0659 01/16 0700  01/17 0659 01/17 0700  01/18 0659    P.O. 120 555 240    I.V. (mL/kg) 1056.6 (15) 2591.9 (36.7)     IV Piggyback 2168.2      Total Intake(mL/kg) 3344.7 (47.4) 3146.9 (44.6) 240 (3.4)    Urine (mL/kg/hr) 1155 (0.7) 300 (0.2)     Emesis/NG output 0      Drains 90 60     Other 0      Stool 0 0     Blood 250      Total Output 1495 360     Net +1849.7 +2786.9 +240           Urine Occurrence 0 x 6 x     Stool Occurrence 0 x 4 x     Emesis Occurrence 0 x               Physical Exam  Constitutional:       General: He is not in acute distress.     Appearance: Normal appearance.   HENT:      Head: Normocephalic and atraumatic.      Mouth/Throat:      Mouth: Mucous membranes are moist.   Eyes:      Extraocular Movements: Extraocular movements intact.   Cardiovascular:      Rate and Rhythm: Normal rate and regular rhythm.      Pulses: Normal pulses.   Pulmonary:      Effort: Pulmonary effort is normal.   Abdominal:      General: Abdomen is flat.      Tenderness: There is abdominal tenderness. There is no right CVA tenderness, left CVA tenderness or guarding.      Comments: No significant tenderness to palpation.  Incisions with clean dry dressings intact.  No  distention, guarding, or rebound.  Drain with serous output.     Musculoskeletal:      Right lower leg: No edema.      Left lower leg: No edema.   Skin:     General: Skin is warm.   Neurological:      General: No focal deficit present.      Mental Status: He is alert and oriented to person, place, and time. Mental status is at baseline.   Psychiatric:         Mood and Affect: Mood normal.         Behavior: Behavior normal.         Thought Content: Thought content normal.          Significant Labs:  I have reviewed all pertinent lab results within the past 24 hours.  CBC:   Recent Labs   Lab 01/17/24  0452   WBC 7.71   RBC 4.18*   HGB 12.2*   HCT 36.1*      MCV 86   MCH 29.2   MCHC 33.8     CMP:   Recent Labs   Lab 01/17/24  0452   *   CALCIUM 8.5*   ALBUMIN 2.2*   PROT 5.9*   *   K 3.9   CO2 26      BUN 20   CREATININE 0.6   ALKPHOS 30*   *   *   BILITOT 1.0       Significant Diagnostics:  I have reviewed all pertinent imaging results/findings within the past 24 hours.  Assessment/Plan:     * Left upper quadrant pain  57 yo M who represents with worsening left and right chest pain, LUQ and RUQ abdominal pain, and back pain. He continues to have no leukocytosis, afebrile (documented 102.7 overnight, but on recheck was normal) and HIDA was negative for acute cholecystitis. Additionally, his complaints are not consistent with cholecystitis or biliary colic. His bilirubin is mildly elevated. EKG on arrival yesterday was notable for septal infarct of indeterminate age.  Now status post laparoscopic cholecystectomy on January 15th, 2024 with signs of significant inflammation and necrosis of the gallbladder.    - OK to advance diet as tolerated  - Multimodal pain control  - Drain care  - Strict I's & O's.  - Continue antibiotics for 4 days postoperatively  - Needs DVT PPx  - Trend labs  - EKG previously with ischemic changes, now showing septal infarct age undetermined, workup per  primary   - recommend keeping him as an inpatient for at least 1 more day for monitoring of his lab function and to increase his mobility.  - multiple bowel movements are okay at this point.  If he continues to have uncontrollable loose stools, tomorrow would consider further investigation into the source of this.  - Rest of care per primary        Helder Cramer MD  General Surgery  [facility] Surg

## 2024-01-17 NOTE — NURSING
Pt able to ambulate to the restroom, loose BM reported, PRN medication given for diarrhea.  No acute distress noted.

## 2024-01-18 VITALS
WEIGHT: 155.63 LBS | OXYGEN SATURATION: 95 % | TEMPERATURE: 100 F | DIASTOLIC BLOOD PRESSURE: 68 MMHG | BODY MASS INDEX: 25.93 KG/M2 | RESPIRATION RATE: 18 BRPM | HEIGHT: 65 IN | SYSTOLIC BLOOD PRESSURE: 148 MMHG | HEART RATE: 63 BPM

## 2024-01-18 LAB
ALBUMIN SERPL BCP-MCNC: 2.1 G/DL (ref 3.5–5.2)
ALP SERPL-CCNC: 41 U/L (ref 55–135)
ALT SERPL W/O P-5'-P-CCNC: 121 U/L (ref 10–44)
ANION GAP SERPL CALC-SCNC: 9 MMOL/L (ref 8–16)
AST SERPL-CCNC: 88 U/L (ref 10–40)
BILIRUB SERPL-MCNC: 1.1 MG/DL (ref 0.1–1)
BUN SERPL-MCNC: 13 MG/DL (ref 6–20)
CALCIUM SERPL-MCNC: 7.7 MG/DL (ref 8.7–10.5)
CHLORIDE SERPL-SCNC: 102 MMOL/L (ref 95–110)
CO2 SERPL-SCNC: 25 MMOL/L (ref 23–29)
CREAT SERPL-MCNC: 0.7 MG/DL (ref 0.5–1.4)
EST. GFR  (NO RACE VARIABLE): >60 ML/MIN/1.73 M^2
GLUCOSE SERPL-MCNC: 159 MG/DL (ref 70–110)
POCT GLUCOSE: 144 MG/DL (ref 70–110)
POCT GLUCOSE: 199 MG/DL (ref 70–110)
POCT GLUCOSE: 222 MG/DL (ref 70–110)
POTASSIUM SERPL-SCNC: 4 MMOL/L (ref 3.5–5.1)
PROT SERPL-MCNC: 5.8 G/DL (ref 6–8.4)
SODIUM SERPL-SCNC: 136 MMOL/L (ref 136–145)

## 2024-01-18 PROCEDURE — 80053 COMPREHEN METABOLIC PANEL: CPT | Performed by: HOSPITALIST

## 2024-01-18 PROCEDURE — 36415 COLL VENOUS BLD VENIPUNCTURE: CPT | Performed by: HOSPITALIST

## 2024-01-18 PROCEDURE — 97535 SELF CARE MNGMENT TRAINING: CPT

## 2024-01-18 PROCEDURE — 25000003 PHARM REV CODE 250

## 2024-01-18 PROCEDURE — 63600175 PHARM REV CODE 636 W HCPCS: Performed by: HOSPITALIST

## 2024-01-18 PROCEDURE — 63600175 PHARM REV CODE 636 W HCPCS

## 2024-01-18 PROCEDURE — 25000003 PHARM REV CODE 250: Performed by: HOSPITALIST

## 2024-01-18 RX ORDER — OXYCODONE HYDROCHLORIDE 5 MG/1
5 TABLET ORAL EVERY 6 HOURS PRN
Qty: 15 TABLET | Refills: 0 | Status: SHIPPED | OUTPATIENT
Start: 2024-01-18 | End: 2024-01-24

## 2024-01-18 RX ORDER — LOPERAMIDE HYDROCHLORIDE 2 MG/1
2 CAPSULE ORAL 4 TIMES DAILY PRN
Qty: 20 CAPSULE | Refills: 0 | Status: SHIPPED | OUTPATIENT
Start: 2024-01-18 | End: 2024-01-28

## 2024-01-18 RX ORDER — AMOXICILLIN AND CLAVULANATE POTASSIUM 875; 125 MG/1; MG/1
1 TABLET, FILM COATED ORAL EVERY 12 HOURS
Qty: 20 TABLET | Refills: 0 | Status: SHIPPED | OUTPATIENT
Start: 2024-01-18 | End: 2024-01-28

## 2024-01-18 RX ADMIN — ACETAMINOPHEN 1000 MG: 500 TABLET ORAL at 05:01

## 2024-01-18 RX ADMIN — ASPIRIN 81 MG: 81 TABLET, COATED ORAL at 08:01

## 2024-01-18 RX ADMIN — INSULIN ASPART 4 UNITS: 100 INJECTION, SOLUTION INTRAVENOUS; SUBCUTANEOUS at 11:01

## 2024-01-18 RX ADMIN — OXYCODONE HYDROCHLORIDE 10 MG: 5 TABLET ORAL at 07:01

## 2024-01-18 RX ADMIN — GABAPENTIN 300 MG: 300 CAPSULE ORAL at 08:01

## 2024-01-18 RX ADMIN — OXYCODONE HYDROCHLORIDE 10 MG: 5 TABLET ORAL at 11:01

## 2024-01-18 RX ADMIN — HYDROMORPHONE HYDROCHLORIDE 1 MG: 1 INJECTION, SOLUTION INTRAMUSCULAR; INTRAVENOUS; SUBCUTANEOUS at 03:01

## 2024-01-18 RX ADMIN — PIPERACILLIN AND TAZOBACTAM 4.5 G: 4; .5 INJECTION, POWDER, LYOPHILIZED, FOR SOLUTION INTRAVENOUS; PARENTERAL at 07:01

## 2024-01-18 RX ADMIN — ATORVASTATIN CALCIUM 40 MG: 40 TABLET, FILM COATED ORAL at 08:01

## 2024-01-18 RX ADMIN — INSULIN ASPART 1 UNITS: 100 INJECTION, SOLUTION INTRAVENOUS; SUBCUTANEOUS at 01:01

## 2024-01-18 NOTE — PLAN OF CARE
TN sent a secure chat to med surg nurse Brigette that patient is clear for discharge from case management's viewpoint.  Will have [facility] and pcp   01/18/24 6027   Final Note   Assessment Type Final Discharge Note   Anticipated Discharge Disposition Home-Health   What phone number can be called within the next 1-3 days to see how you are doing after discharge?   (see chart)   Hospital Resources/Appts/Education Provided Provided patient/caregiver with written discharge plan information   Post-Acute Status   Post-Acute Authorization Home Health   Home Health Status Set-up Complete/Auth obtained   Patient choice form signed by patient/caregiver List with quality metrics by geographic area provided   Discharge Delays None known at this time

## 2024-01-18 NOTE — PT/OT/SLP PROGRESS
Occupational Therapy   Treatment    Name: Malou Suarez  MRN: [id number]  Admitting Diagnosis:  Left upper quadrant pain  3 Days Post-Op    Recommendations:     Discharge Recommendations: Low Intensity Therapy  Discharge Equipment Recommendations:  bedside commode  Barriers to discharge:   (condition trend, per RN d/c underway this date.)    Assessment:     Malou Suarez is a 58 y.o. male with a medical diagnosis of Left upper quadrant pain, Spouse present, both parties stating readiness for community re-entry this date. Performance deficits affecting function are impaired endurance, impaired functional mobility, impaired self care skills (resolving at present per Patient and Spouse.).     Rehab Prognosis:  Good; patient would benefit from acute skilled OT services to address these deficits and reach maximum level of function.       Plan:     Patient to be seen 3 x/week to address the above listed problems via self-care/home management, therapeutic activities, therapeutic exercises  Plan of Care Expires: 01/21/24  Plan of Care Reviewed with: patient, spouse (with assist of pharmacy staff member for interpretation needs.)    Subjective     Chief Complaint: None stated this date.   Patient/Family Comments/goals: Return to own home with Sp[facility] at [facility].   Pain/Comfort:  Pain Rating 1: 0/10    Objective:     Communicated with: RN prior to session. Pharmacy staff member assigned to floor assisted with [place] translation 2* no monitor available.  Patient found up in chair  dressed with telemetry, peripheral IV, ANDREEA drain, pulse ox (continuous) upon OT entry to room.    General Precautions: Standard, fall, respiratory    Orthopedic Precautions:N/A  Braces: N/A  Respiratory Status: Room air     Occupational Performance:     Bed Mobility:  NT 2* Patient encountered dressed, OOB     Functional Mobility/Transfers:  Patient completed Sit <> Stand Transfer with modified independence  with  no assistive device   Functional Mobility: mod  (I), amb to toilet with Spouse 2* medical lines.     Activities of Daily Living:  Grooming: set up seated and standing, set up 2* inorganic nature of current care setting.   Upper Body Dressing: mod (I) Tshirt and own bath robe and 2* medical lines.   Toileting:NT, needs untimely. Per Spouse and via  Patient toileting w/o assist, care staff endorsing, NT formally by OT, transfer as above.       [facility] 6 Click ADL: 22    Treatment & Education: Translation assist with Tin of Pharmacy dept. Both parties Patient and Spouse state/demo/indicate understanding and state readiness for community re-entry this date.   Role of OT and POC  Gen in room safety edu  ADL retraining / Functional mobility training  Importance of EOB/OOB activity  Pt performed dynamic sitting and standing balance activities w/ inclusion of func reach component and visual scan cues to support recovery of ADL volition/independence.  Post acute discharge outlook discussed  DME use/safety, post discharge resources ID, etc.   Questions answered within OT scope of practice  SESSION'S END:  *General in room safety and (S)'d mobility advised, call button use reviewed/in hand.  Patient left up in chair with all lines intact, RN notified, and Spouse present.    GOALS:   Multidisciplinary Problems       Occupational Therapy Goals          Problem: Occupational Therapy    Goal Priority Disciplines Outcome Interventions   Occupational Therapy Goal     OT, PT/OT Adequate for Care Transition    Description: Goals to be met by: 01/21/24     Patient will increase functional independence with ADLs by performing:    UE Dressing with [place].  LE Dressing with Set-up Assistance.  Grooming while seated at sink with Set-up Assistance.  Toileting from bedside commode with Set-up Assistance for hygiene and clothing management.   Sitting at edge of bed x 30-60  minutes with Supervision.  Toilet transfer to bedside commode with Supervision.  Upper extremity  exercise program x10  reps per handout, with supervision.                         Time Tracking:     OT Date of Treatment: 01/18/24  OT Start Time: 1242  OT Stop Time: 1300  OT Total Time (min): 18 min    Billable Minutes:Self Care/Home Management 18 1/18/2024

## 2024-01-18 NOTE — ASSESSMENT & PLAN NOTE
59 yo M who represents with worsening left and right chest pain, LUQ and RUQ abdominal pain, and back pain. He continues to have no leukocytosis, afebrile (documented 102.7 overnight, but on recheck was normal) and HIDA was negative for acute cholecystitis. Additionally, his complaints are not consistent with cholecystitis or biliary colic. His bilirubin is mildly elevated. EKG on arrival yesterday was notable for septal infarct of indeterminate age.  Now status post laparoscopic cholecystectomy on January 15th, 2024 with signs of significant inflammation and necrosis of the gallbladder.    - from a surgical standpoint we believe that he is stable for discharge home  - will go home with ANDREEA drain in place.  - transition to oral antibiotics  - okay for regular diet  - continue multimodal pain control  - we will scheduled him for a clinic appointment in 1 week for drain removal

## 2024-01-18 NOTE — PROGRESS NOTES
[facility] Surg  General Surgery  Progress Note    Subjective:     History of Present Illness:  Malou Suarez is a 58 y.o. with a pmh of DM and HTN who represents to the hospital with complaints of left sided chest pain with radiation to the back on the left. He was recently admitted for similar complaints. He underwent extensive workup. There were some CT findings initially concerning for cholecystitis; however, his symptoms were not consistent with this. HIDA scan showed filling of the gallbladder which ruled out cholecystitis. HE had some ischemic changes on EKG and cardiology workup was otherwise essentially normal. His symptoms improved significantly and he was discharged. He represented the following day with worsening pain. He now has worsening bilateral chest pain worsened with deep breathing. He also has LUQ pain, RUQ pain, back pain and was found to have a compression fracture of L1. He continues to have no fevers, WBC is normal. Bilirubin was 1.1 yesterday and 1.9 today. He denies nausea/vomiting and had a bowel movement yesterday. He is hemodynamically stable.     Post-Op Info:  Procedure(s) (LRB):  LAPAROSCOPIC CHOLECYSTECTOMY (N/A)   3 Days Post-Op     Interval History:  Patient continues to do well.  Pain is controlled.  Lab work has normalizing.  Drain output relatively benign.    Medications:  Continuous Infusions:  Scheduled Meds:   acetaminophen  1,000 mg Oral Q8H    aspirin  81 mg Oral Daily    atorvastatin  40 mg Oral Daily    gabapentin  300 mg Oral TID    insulin detemir U-100  10 Units Subcutaneous QHS    piperacillin-tazobactam (Zosyn) IV (PEDS and ADULTS) (extended infusion is not appropriate)  4.5 g Intravenous Q8H    polyethylene glycol  17 g Oral BID     PRN Meds:bisacodyL, dextrose 10%, dextrose 10%, dextrose 10%, dextrose 10%, glucagon (human recombinant), glucose, glucose, HYDROmorphone, HYDROmorphone, insulin aspart U-100, loperamide, melatonin, naloxone, ondansetron, ondansetron,  oxyCODONE, oxyCODONE, polyethylene glycol, prochlorperazine, prochlorperazine, sodium chloride 0.9%, sodium chloride 0.9%     Review of patient's allergies indicates:  No Known Allergies  Objective:     Vital Signs (Most Recent):  Temp: 99.9 °F (37.7 °C) (01/18/24 0648)  Pulse: 65 (01/18/24 0648)  Resp: 16 (01/18/24 0648)  BP: 130/62 (01/18/24 0648)  SpO2: (!) 93 % (01/18/24 0648) Vital Signs (24h Range):  Temp:  [97 °F (36.1 °C)-99.9 °F (37.7 °C)] 99.9 °F (37.7 °C)  Pulse:  [62-81] 65  Resp:  [16-18] 16  SpO2:  [92 %-94 %] 93 %  BP: (122-148)/(57-72) 130/62     Weight: 70.6 kg (155 lb 10.3 oz)  Body mass index is 25.9 kg/m².    Intake/Output - Last 3 Shifts         01/16 0700  01/17 0659 01/17 0700  01/18 0659 01/18 0700  01/19 0659    P.O. 555 840 120    I.V. (mL/kg) 2591.9 (36.7)      IV Piggyback  476.9     Total Intake(mL/kg) 3146.9 (44.6) 1316.9 (18.7) 120 (1.7)    Urine (mL/kg/hr) 300 (0.2) 5 (0)     Emesis/NG output       Drains 60 30     Other       Stool 0 3     Blood       Total Output 360 38     Net +2786.9 +1278.9 +120           Urine Occurrence 6 x 7 x     Stool Occurrence 4 x 6 x              Physical Exam  Constitutional:       General: He is not in acute distress.     Appearance: Normal appearance.   HENT:      Head: Normocephalic and atraumatic.      Mouth/Throat:      Mouth: Mucous membranes are moist.   Eyes:      Extraocular Movements: Extraocular movements intact.   Cardiovascular:      Rate and Rhythm: Normal rate and regular rhythm.      Pulses: Normal pulses.   Pulmonary:      Effort: Pulmonary effort is normal.   Abdominal:      General: Abdomen is flat.      Tenderness: There is abdominal tenderness. There is no right CVA tenderness, left CVA tenderness or guarding.      Comments: No significant tenderness to palpation.  Incisions with clean dry dressings intact.  No distention, guarding, or rebound.  Drain with serous output.     Musculoskeletal:      Right lower leg: No edema.      Left  lower leg: No edema.   Skin:     General: Skin is warm.   Neurological:      General: No focal deficit present.      Mental Status: He is alert and oriented to person, place, and time. Mental status is at baseline.   Psychiatric:         Mood and Affect: Mood normal.         Behavior: Behavior normal.         Thought Content: Thought content normal.          Significant Labs:  I have reviewed all pertinent lab results within the past 24 hours.  CBC:   Recent Labs   Lab 01/17/24  0452   WBC 7.71   RBC 4.18*   HGB 12.2*   HCT 36.1*      MCV 86   MCH 29.2   MCHC 33.8     CMP:   Recent Labs   Lab 01/18/24  0441   *   CALCIUM 7.7*   ALBUMIN 2.1*   PROT 5.8*      K 4.0   CO2 25      BUN 13   CREATININE 0.7   ALKPHOS 41*   *   AST 88*   BILITOT 1.1*       Significant Diagnostics:  I have reviewed all pertinent imaging results/findings within the past 24 hours.  Assessment/Plan:     * Left upper quadrant pain  59 yo M who represents with worsening left and right chest pain, LUQ and RUQ abdominal pain, and back pain. He continues to have no leukocytosis, afebrile (documented 102.7 overnight, but on recheck was normal) and HIDA was negative for acute cholecystitis. Additionally, his complaints are not consistent with cholecystitis or biliary colic. His bilirubin is mildly elevated. EKG on arrival yesterday was notable for septal infarct of indeterminate age.  Now status post laparoscopic cholecystectomy on January 15th, 2024 with signs of significant inflammation and necrosis of the gallbladder.    - from a surgical standpoint we believe that he is stable for discharge home  - will go home with ANDREEA drain in place.  - transition to oral antibiotics  - okay for regular diet  - continue multimodal pain control  - we will scheduled him for a clinic appointment in 1 week for drain removal        Helder Cramer MD  General Surgery  [facility]

## 2024-01-18 NOTE — NURSING
[facility]  Nurses Note -- 4 Eyes      1/18/2024       Skin assessed on: Q Shift      [x] No Pressure Injuries Present    []Prevention Measures Documented    [] Yes LDA  for Pressure Injury Previously documented     [] Yes New Pressure Injury Discovered   [] LDA for New Pressure Injury Added      Attending RN:  Brigette Colunga LPN     Second RN:  PAIGE Alvarez

## 2024-01-18 NOTE — NURSING
Pt and family member verbalize understanding on teaching done by virtual nurse,pt iv removed,pt assisted to w/c with out injury,pt left unit with pt transport and his family member at his side.

## 2024-01-18 NOTE — PLAN OF CARE
Problem: Occupational Therapy  Goal: Occupational Therapy Goal  Description: Goals to be met by: 01/21/24     Patient will increase functional independence with ADLs by performing:    UE Dressing with [place].  LE Dressing with Set-up Assistance.  Grooming while seated at sink with Set-up Assistance.  Toileting from bedside commode with Set-up Assistance for hygiene and clothing management.   Sitting at edge of bed x 30-60  minutes with Supervision.  Toilet transfer to bedside commode with Supervision.  Upper extremity exercise program x10  reps per handout, with supervision.    Outcome: Progressing, Adequate for Care Transition

## 2024-01-18 NOTE — DISCHARGE SUMMARY
[facility] Medicine  Discharge Summary      Patient Name: Malou Suarez  MRN: [id number]  [facility]: [id number]  Patient Class: IP- Inpatient  Admission Date: 1/13/2024  Hospital Length of Stay: 2 days  Discharge Date and Time:  01/18/2024 11:22 AM  Attending Physician: Haresh De Oliveira, *   Discharging Provider: Haresh De Oliveira MD  Primary Care Provider: Fred Dia MD    Primary Care Team: Networked reference to record PCT     HPI:   Malou Suarez is a 58 y.o. with a pmh of DM and HTN presents to the hospital with complaints of left sided chest pain with radiation to the back on the left. Patient was recently admitted with left upper quadrant pain associated with n/v and seen by both Surgery and Cardiology. Surgery cleared patient as HIDA scan showed no acute cholecystitis.  Cardiology also cleared patient for atypical chest pain with follow-up stress test outpatient. He was discharged on 01/13/2024 from the hospital and returned the same day to the ED similar complaints. He states that he was pain-free upon discharge but pain returned at home which prompted his return.  He describes pain as squeezing and sharp on the left side of his chest which radiates to his left back mostly in the thoracic region. Patient states that he is unable to tolerate anything by mouth due to his pain. Patient took the pain medicine given to him on discharge without much relief. He denies any alleviating or exacerbating factors. Patient denies headache, fever, blurry vision, nausea, vomiting, diarrhea, melena, hematemesis, hematochezia, hematuria, or any other associated symptoms. Of note he states that he did not take his blood pressure and diabetes medications prior to arrival.     service was used due to language barrier.  ID# [id number]    In the ED: Patient is afebrile without leukocytosis, hypertensive on presentation 189/75, 98 % O2 sat on RA, CBC unremarkable, D-dimer 0.5, magnesium 1.5,  ALP 41, troponin normal, POCT glucose 250, CTA chest (1) no evidence of PE no acute intrathoracic abnormality 2) mild to moderate pulmonary emphysema, EKG shows sinus bradycardia, no STEMI at 58 bpm. Patient given cyclobenzaprine 10 mg, ketorolac 15 mg IV, morphine 4 mg IV x2 in the ED.    Case discussed with ED provider.    Malou Suarez we will be placed under observation for further medical management of his intractable pain.    Procedure(s) (LRB):  LAPAROSCOPIC CHOLECYSTECTOMY (N/A)      Hospital Course:   Malou Suarez is a 58 y.o. with a pmh of DM and HTN presents to the hospital with complaints of left sided chest pain with radiation to the back on the left. Patient was recently admitted with left upper quadrant pain associated with n/v and seen by both Surgery and Cardiology. Surgery cleared patient as HIDA scan showed no acute cholecystitis.  Cardiology also cleared patient for atypical chest pain with follow-up stress test outpatient. He was discharged on 01/13/2024 from the hospital and returned the same day to the ED similar complaints.   In the ED: Patient is afebrile without leukocytosis, hypertensive on presentation 189/75, 98 % O2 sat on RA, CBC unremarkable, D-dimer 0.5, magnesium 1.5, ALP 41, troponin normal, POCT glucose 250, CTA chest (1) no evidence of PE no acute intrathoracic abnormality 2) mild to moderate pulmonary emphysema, EKG shows sinus bradycardia, no STEMI at 58 bpm. Patient given cyclobenzaprine 10 mg, ketorolac 15 mg IV, morphine 4 mg IV x2 in the ED.  CTA show,Gallbladder markedly thickened and enlarged, with substantially worsened inflammatory change when compared to the 01/12/2024 CT. Redemonstrated cholelithiasis, with notable stone in the cystic duct unchanged in position. Extensive intraluminal air now noted throughout the gallbladder lumen, possibly on the basis of emphysematous cholecystitis, abscess formation, and/or fistulization with adjoining gastrointestinal tract. Question few  small foci of extraluminal air lateral aspect of the gallbladder, which could indicate perforation. Additionally, lobulated focus of increased attenuation measuring the order of 22 mm in this region could indicate hemorrhage.   S/P lap. Ananya on 1.15.24,advancing diet gradually,PT,OT,remain son IV Luisn,  PT,OT is consulted.  Symptoms of patient is improved,at DC time pain was controlled,was tolerating diet.sara was discharged home with HH and pain medications and follow up with PCPa nd surgery as out patient.     Goals of Care Treatment Preferences:  Code Status: Full Code      Consults:   Consults (From admission, onward)          Status Ordering Provider     Inpatient consult to Neurosurgery  Once        Provider:  Gabriella Wick PA-C    Completed ZOEY WILKINSON     Inpatient consult to General Surgery  Once        Provider:  Ari Vu MD    Completed ROSAURA JUDGE            No new Assessment & Plan notes have been filed under this hospital service since the last note was generated.  Service: Hospital Medicine    Final Active Diagnoses:    Diagnosis Date Noted POA    PRINCIPAL PROBLEM:  Left upper quadrant pain [R10.12] 01/12/2024 Yes    Cholecystitis, acute [K81.0] 01/17/2024 Yes    Hypertension [I10] 01/14/2024 Yes    Type 2 diabetes mellitus [E11.9] 01/14/2024 Yes    Acute left-sided thoracic back pain [M54.6] 01/14/2024 Yes    Hyperlipidemia [E78.5] 01/14/2024 Yes    Elevated d-dimer [R79.89] 01/14/2024 Yes    Pulmonary emphysema [J43.9] 01/14/2024 Yes    Positive colorectal cancer screening using Cologuard test [R19.5] 01/13/2024 Yes    Other chest pain [R07.89] 01/12/2024 Yes      Problems Resolved During this Admission:    Diagnosis Date Noted Date Resolved POA    Intractable pain, left side chest and thoracic spine [R52] 01/14/2024 01/14/2024 Yes       Discharged Condition: stable    Disposition: Home-Health Care [facility]    Follow Up:   Follow-up Information       Fred Dia MD.  "Go on 1/25/2024.    Specialty: Family Medicine  Why: Appointment scheduled for 2:00pm  Contact information:  [address] [place] [id number] [phone]                           Patient Instructions:      COMMODE FOR HOME USE     Order Specific Question Answer Comments   Type: Standard    Height: 5' 5" (1.651 m)    Weight: 70.6 kg (155 lb 10.3 oz)    Does patient have medical equipment at home? none    Length of need (1-99 months): 3      Ambulatory referral/consult to General Surgery   Standing Status: Future   Referral Priority: Routine Referral Type: Consultation   Referral Reason: Specialty Services Required   Requested Specialty: General Surgery   Number of Visits Requested: 1     Activity as tolerated       Significant Diagnostic Studies: Labs: BMP:   Recent Labs   Lab 01/17/24  0452 01/18/24  0441   * 159*   * 136   K 3.9 4.0    102   CO2 26 25   BUN 20 13   CREATININE 0.6 0.7   CALCIUM 8.5* 7.7*   , CMP   Recent Labs   Lab 01/17/24  0452 01/18/24  0441   * 136   K 3.9 4.0    102   CO2 26 25   * 159*   BUN 20 13   CREATININE 0.6 0.7   CALCIUM 8.5* 7.7*   PROT 5.9* 5.8*   ALBUMIN 2.2* 2.1*   BILITOT 1.0 1.1*   ALKPHOS 30* 41*   * 88*   * 121*   ANIONGAP 6* 9   , and CBC   Recent Labs   Lab 01/17/24  0452   WBC 7.71   HGB 12.2*   HCT 36.1*        Microbiology: Blood Culture No results found for: "LABBLOO"  Radiology: X-Ray: CXR: X-Ray Chest 1 View (CXR): No results found for this visit on 01/13/24. and X-Ray Chest PA and Lateral (CXR): No results found for this visit on 01/13/24.  CT scan: CT ABDOMEN PELVIS WITH CONTRAST: No results found for this visit on 01/13/24. and CT ABDOMEN PELVIS WITHOUT CONTRAST: No results found for this visit on 01/13/24.    Pending Diagnostic Studies:       Procedure Component Value Units Date/Time    Specimen to Pathology, Surgery General Surgery [[id number]] Collected: 01/15/24 1706    Order Status: Sent Lab Status: " In process Updated: 01/16/24 1227    Specimen: Tissue            Medications:  Reconciled Home Medications:      Medication List        START taking these medications      amoxicillin-clavulanate 875-125mg 875-125 mg per tablet  Commonly known as: AUGMENTIN  Take 1 tablet by mouth every 12 (twelve) hours. for 10 days     loperamide 2 mg capsule  Commonly known as: IMODIUM  Take 1 capsule (2 mg total) by mouth 4 (four) times daily as needed for Diarrhea.     oxyCODONE 5 MG immediate release tablet  Commonly known as: ROXICODONE  Take 1 tablet (5 mg total) by mouth every 6 (six) hours as needed.            CHANGE how you take these medications      insulin glargine 100 unit/mL injection  Commonly known as: Lantus  Inject into the skin every evening.  What changed: Another medication with the same name was removed. Continue taking this medication, and follow the directions you see here.            CONTINUE taking these medications      alogliptin-pioglitazone 12.5-15 mg Tab  Commonly known as: OSENI  Take by mouth once daily.     atorvastatin 40 MG tablet  Commonly known as: LIPITOR  Take 1 tablet (40 mg total) by mouth once daily.     empagliflozin 10 mg tablet  Commonly known as: Jardiance  Take 10 mg by mouth once daily.     ergocalciferol 50,000 unit Cap  Commonly known as: ERGOCALCIFEROL  Take 50,000 Units by mouth every 7 days.     insulin aspart protamine-insulin aspart 100 unit/mL (70-30) Inpn pen  Commonly known as: NovoLOG 70/30  Inject into the skin 2 (two) times daily before meals.     JANUMET -1,000 mg Tm24  Generic drug: SITagliptan-metformin  Take 1 tablet by mouth every morning.     losartan 25 MG tablet  Commonly known as: COZAAR  Take 25 mg by mouth.     VEMLIDY 25 mg Tab  Generic drug: tenofovir alafenamide  Take 1 tablet by mouth.            STOP taking these medications      aspirin 81 MG EC tablet  Commonly known as: ECOTRIN     traMADoL 50 mg tablet  Commonly known as: ULTRAM               Indwelling Lines/Drains at time of discharge:   Lines/Drains/Airways       Drain  Duration                  Closed/Suction Drain 01/15/24 4689 Tube - 1 Anterior Abdomen Bulb 15 Fr. 2 days                    Time spent on the discharge of patient:  over 30  minutes         Haresh De Oliveira MD  Department of Hospital Medicine  [facility]

## 2024-01-18 NOTE — PLAN OF CARE
Problem: Adult Inpatient Plan of Care  Goal: Plan of Care Review  Outcome: Ongoing, Progressing  Flowsheets (Taken 1/17/2024 1805)  Plan of Care Reviewed With: patient  Goal: Patient-Specific Goal (Individualized)  Outcome: Ongoing, Progressing  Goal: Absence of Hospital-Acquired Illness or Injury  Outcome: Ongoing, Progressing  Intervention: Identify and Manage Fall Risk  Flowsheets (Taken 1/17/2024 1805)  Safety Promotion/Fall Prevention:   assistive device/personal item within reach   Fall Risk reviewed with patient/family   high risk medications identified   nonskid shoes/socks when out of bed   side rails raised x 2   instructed to call staff for mobility   room near unit station   medications reviewed  Intervention: Prevent Skin Injury  Flowsheets (Taken 1/17/2024 1805)  Body Position:   position changed independently   weight shifting  Skin Protection:   adhesive use limited   tubing/devices free from skin contact  Intervention: Prevent and Manage VTE (Venous Thromboembolism) Risk  Flowsheets (Taken 1/17/2024 1805)  Activity Management:   Ankle pumps - L1   Arm raise - L1   Rolling - L1   Straight leg raise - L1  VTE Prevention/Management:   ambulation promoted   bleeding precations maintained   bleeding risk assessed  Range of Motion: active ROM (range of motion) encouraged  Intervention: Prevent Infection  Flowsheets (Taken 1/17/2024 1805)  Infection Prevention: hand hygiene promoted  Goal: Optimal Comfort and Wellbeing  Outcome: Ongoing, Progressing  Goal: Readiness for Transition of Care  Outcome: Ongoing, Progressing     Problem: Diabetes Comorbidity  Goal: Blood Glucose Level Within Targeted Range  Outcome: Ongoing, Progressing  Intervention: Monitor and Manage Glycemia  Flowsheets (Taken 1/17/2024 1805)  Glycemic Management:   blood glucose monitored   supplemental insulin given     Problem: Fall Injury Risk  Goal: Absence of Fall and Fall-Related Injury  Outcome: Ongoing, Progressing  Intervention:  Identify and Manage Contributors  Flowsheets (Taken 1/17/2024 1805)  Self-Care Promotion:   independence encouraged   BADL personal objects within reach   BADL personal routines maintained   meal set-up provided   safe use of adaptive equipment encouraged  Medication Review/Management:   medications reviewed   high-risk medications identified   Pt alert able to make needs known,domonique meds well,IV abx remains in progress,no s/s adverse reaction noted,reposition self q 2hrs,pain controlled by prn pain medication,POC explained,remains free from falls and pressure injuries,safety maintained,continue monitoring.

## 2024-01-18 NOTE — PLAN OF CARE
Patient is awake alert and oriented. IV antibiotics as ordered. Medicated for abdominal incision pain as ordered. Surgical dressings CDI and ANDREEA drain to bulb suction. 10ml output for my shift. Continue with plan of care as ordered. No distress noted  Problem: Adult Inpatient Plan of Care  Goal: Plan of Care Review  1/18/2024 0527 by Kim Haines, PAIGE  Outcome: Ongoing, Progressing  Goal: Patient-Specific Goal (Individualized)  1/18/2024 0528 by Kim Haines, PAIGE    Outcome: Ongoing, Progressing  Goal: Optimal Comfort and Wellbeing  1/18/2024 0527 by Kim Haines, RN  Outcome: Ongoing, Progressing  Goal: Readiness for Transition of Care  1/18/2024 0527 by Kim Haines, RN  Outcome: Ongoing, Progressing     Problem: Diabetes Comorbidity  Goal: Blood Glucose Level Within Targeted Range  1/18/2024 0528 by Kim Haines, PAIGE  Outcome: Ongoing, Progressing  1/18/2024 0527 by Kim Haines, RN  Outcome: Ongoing, Progressing     Problem: Fall Injury Risk  Goal: Absence of Fall and Fall-Related Injury  1/18/2024 0527 by Kim Haines, RN  Outcome: Ongoing, Progressing     Problem: Pain Acute  Goal: Acceptable Pain Control and Functional Ability  1/18/2024 0527 by Kim Haines, PAIGE  Outcome: Ongoing, Progressing     Problem: Infection  Goal: Absence of Infection Signs and Symptoms  Outcome: Ongoing, Progressing     Problem: Impaired Wound Healing  Goal: Optimal Wound Healing  Outcome: Ongoing, Progressing

## 2024-01-18 NOTE — NURSING
[facility]  Nurses Note -- 4 Eyes      1/17/2024       Skin assessed on: Q Shift      [x] No Pressure Injuries Present    []Prevention Measures Documented    [] Yes LDA  for Pressure Injury Previously documented     [] Yes New Pressure Injury Discovered   [] LDA for New Pressure Injury Added      Attending RN:  Kim Haines, RN     Second RN:  Brigette Bae LPN

## 2024-01-18 NOTE — PLAN OF CARE
[facility]      HOME HEALTH ORDERS  FACE TO FACE ENCOUNTER    Patient Name: Malou Suarez  YOB: 1965    PCP: Fred Dia MD   PCP Address: [address] [place], [place] [address]PADMINI GABRIELA / GREG WOODRUFF  PCP Phone Number: [phone]  PCP Fax: [phone]    Encounter Date: 1/13/24    Admit to Home Health    Diagnoses:  Active Hospital Problems    Diagnosis  POA    *Left upper quadrant pain [R10.12]  Yes    Cholecystitis, acute [K81.0]  Yes    Hypertension [I10]  Yes    Type 2 diabetes mellitus [E11.9]  Yes    Acute left-sided thoracic back pain [M54.6]  Yes    Hyperlipidemia [E78.5]  Yes    Elevated d-dimer [R79.89]  Yes    Pulmonary emphysema [J43.9]  Yes    Positive colorectal cancer screening using Cologuard test [R19.5]  Yes    Other chest pain [R07.89]  Yes      Resolved Hospital Problems    Diagnosis Date Resolved POA    Intractable pain, left side chest and thoracic spine [R52] 01/14/2024 Yes       Follow Up Appointments:  Future Appointments   Date Time Provider Department Center   1/24/2024 11:30 AM Maury Mack MD [facility]   1/30/2024  9:20 AM Jordon Pavon MD [facility]       Allergies:Review of patient's allergies indicates:  No Known Allergies    Medications: Review discharge medications with patient and family and provide education.    Current Facility-Administered Medications   Medication Dose Route Frequency Provider Last Rate Last Admin    acetaminophen tablet 1,000 mg  1,000 mg Oral Q8H Ari Vu MD   1,000 mg at 01/18/24 0504    aspirin EC tablet 81 mg  81 mg Oral Daily Morenita Muniz PA-C   81 mg at 01/18/24 0855    atorvastatin tablet 40 mg  40 mg Oral Daily Morenita Muniz PA-C   40 mg at 01/18/24 0855    bisacodyL suppository 10 mg  10 mg Rectal Daily PRN Tello Ng MD        dextrose 10% bolus 125 mL 125 mL  12.5 g Intravenous PRN Morenita Muniz PA-C        dextrose 10% bolus 125 mL 125 mL  12.5 g Intravenous PRN Morenita Muniz PA-LUTHER        dextrose  10% bolus 250 mL 250 mL  25 g Intravenous PRN Mahmud, Rashed, PA-C        dextrose 10% bolus 250 mL 250 mL  25 g Intravenous PRN Mahmud, Rashed, PA-C        gabapentin capsule 300 mg  300 mg Oral TID Ari Vu MD   300 mg at 01/18/24 0855    glucagon (human recombinant) injection 1 mg  1 mg Intramuscular PRN Mahmud, Rashed, PA-C        glucose chewable tablet 16 g  16 g Oral PRN Mahmud, Rashed, PA-C        glucose chewable tablet 24 g  24 g Oral PRN Mahmud, Rashed, PA-C        HYDROmorphone (PF) injection 0.2 mg  0.2 mg Intravenous Q5 Min PRN Naveed Richey MD        HYDROmorphone injection 1 mg  1 mg Intravenous Q3H PRN Ari Vu MD   1 mg at 01/18/24 0356    insulin aspart U-100 pen 0-10 Units  0-10 Units Subcutaneous Q6H PRN Mahmud, Rashed, PA-C   1 Units at 01/18/24 0139    insulin detemir U-100 (Levemir) pen 10 Units  10 Units Subcutaneous QHS Mahmud, Rashed, PA-C   10 Units at 01/17/24 2204    loperamide capsule 2 mg  2 mg Oral QID PRN Haresh De Oliveira MD   2 mg at 01/17/24 0621    melatonin tablet 6 mg  6 mg Oral Nightly PRN Mahmud, Rashed, PA-C        naloxone 0.4 mg/mL injection 0.02 mg  0.02 mg Intravenous PRN Mahmud, Rashed, PA-C        ondansetron injection 4 mg  4 mg Intravenous Q8H PRN Mahmud, Rashed, PA-C        ondansetron injection 4 mg  4 mg Intravenous Daily PRN Naveed Richey MD        oxyCODONE immediate release tablet 10 mg  10 mg Oral Q4H PRN Ari Vu MD   10 mg at 01/18/24 0701    oxyCODONE immediate release tablet 5 mg  5 mg Oral Q4H PRN Ari Vu MD   5 mg at 01/17/24 2220    piperacillin-tazobactam (ZOSYN) 4.5 g in dextrose 5 % in water (D5W) 100 mL IVPB (MB+)  4.5 g Intravenous Q8H Haresh De Oliveira MD 25 mL/hr at 01/18/24 0705 4.5 g at 01/18/24 0705    polyethylene glycol packet 17 g  17 g Oral BID PRN Tello Ng MD        polyethylene glycol packet 17 g  17 g Oral BID Haresh De Oliveira MD        prochlorperazine injection Soln 5 mg  5  mg Intravenous Q6H PRN Morenita Muniz PA-C        prochlorperazine injection Soln 5 mg  5 mg Intravenous Q30 Min PRN Naveed Richey MD        sodium chloride 0.9% flush 10 mL  10 mL Intravenous Q12H PRN Morenita Muniz PA-C        sodium chloride 0.9% flush 10 mL  10 mL Intravenous PRN Naveed Richey MD         Current Discharge Medication List        START taking these medications    Details   amoxicillin-clavulanate 875-125mg (AUGMENTIN) 875-125 mg per tablet Take 1 tablet by mouth every 12 (twelve) hours. for 10 days  Qty: 20 tablet, Refills: 0      loperamide (IMODIUM) 2 mg capsule Take 1 capsule (2 mg total) by mouth 4 (four) times daily as needed for Diarrhea.  Qty: 20 capsule, Refills: 0      oxyCODONE (ROXICODONE) 5 MG immediate release tablet Take 1 tablet (5 mg total) by mouth every 6 (six) hours as needed.  Qty: 15 tablet, Refills: 0    Comments: Quantity prescribed more than 7 day supply? No           CONTINUE these medications which have NOT CHANGED    Details   alogliptin-pioglitazone 12.5-15 mg Tab Take by mouth once daily.      atorvastatin (LIPITOR) 40 MG tablet Take 1 tablet (40 mg total) by mouth once daily.  Qty: 90 tablet, Refills: 3      empagliflozin 10 mg Tab Take 10 mg by mouth once daily.      ergocalciferol (ERGOCALCIFEROL) 50,000 unit Cap Take 50,000 Units by mouth every 7 days.      insulin aspart protamine-insulin aspart (NOVOLOG 70/30) 100 unit/mL (70-30) InPn pen Inject into the skin 2 (two) times daily before meals.      insulin glargine (LANTUS) 100 unit/mL injection Inject into the skin every evening.      JANUMET -1,000 mg TM24 Take 1 tablet by mouth every morning.      losartan (COZAAR) 25 MG tablet Take 25 mg by mouth.      VEMLIDY 25 mg Tab Take 1 tablet by mouth.           STOP taking these medications       aspirin (ECOTRIN) 81 MG EC tablet Comments:   Reason for Stopping:         BASAGLAR KWIKPEN U-100 INSULIN glargine 100 units/mL SubQ pen Comments:   Reason  for Stopping:         traMADoL (ULTRAM) 50 mg tablet Comments:   Reason for Stopping:                 I have seen and examined this patient within the last 30 days. My clinical findings that support the need for the home health skilled services and home bound status are the following:no   Weakness/numbness causing balance and gait disturbance due to Weakness/Debility making it taxing to leave home.     Diet:   diabetic diet 2000 calorie        Referrals/ Consults  Physical Therapy to evaluate and treat. Evaluate for home safety and equipment needs; Establish/upgrade home exercise program. Perform / instruct on therapeutic exercises, gait training, transfer training, and Range of Motion.  Occupational Therapy to evaluate and treat. Evaluate home environment for safety and equipment needs. Perform/Instruct on transfers, ADL training, ROM, and therapeutic exercises.    Activities:   activity as tolerated    Nursing:   Agency to admit patient within 24 hours of hospital discharge unless specified on physician order or at patient request    SN to complete comprehensive assessment including routine vital signs. Instruct on disease process and s/s of complications to report to MD. Review/verify medication list sent home with the patient at time of discharge  and instruct patient/caregiver as needed. Frequency may be adjusted depending on start of care date.     Skilled nurse to perform up to 3 visits PRN for symptoms related to diagnosis    Notify MD if SBP > 160 or < 90; DBP > 90 or < 50; HR > 120 or < 50; Temp > 101; O2 < 88%; Other:         Ok to schedule additional visits based on staff availability and patient request on consecutive days within the home health episode.    When multiple disciplines ordered:    Start of Care occurs on Sunday - Wednesday schedule remaining discipline evaluations as ordered on separate consecutive days following the start of care.    Thursday SOC -schedule subsequent evaluations Friday  and Monday the following week.     Friday - Saturday SOC - schedule subsequent discipline evaluations on consecutive days starting Monday of the following week.    For all post-discharge communication and subsequent orders please contact patient's primary care physician. If unable to reach primary care physician or do not receive response within 30 minutes, please contact [facility]  for clinical staff order clarification    Miscellaneous   Routine Skin for Bedridden Patients: Instruct patient/caregiver to apply moisture barrier cream to all skin folds and wet areas in perineal area daily and after baths and all bowel movements.    Home Health Aide:  Nursing Twice weekly, Physical Therapy Twice weekly, and Occupational Therapy Twice weekly    Wound Care Orders  no    I certify that this patient is confined to his home and needs intermittent skilled nursing care, physical therapy, and occupational therapy.

## 2024-01-18 NOTE — SUBJECTIVE & OBJECTIVE
Interval History:  Patient continues to do well.  Pain is controlled.  Lab work has normalizing.  Drain output relatively benign.    Medications:  Continuous Infusions:  Scheduled Meds:   acetaminophen  1,000 mg Oral Q8H    aspirin  81 mg Oral Daily    atorvastatin  40 mg Oral Daily    gabapentin  300 mg Oral TID    insulin detemir U-100  10 Units Subcutaneous QHS    piperacillin-tazobactam (Zosyn) IV (PEDS and ADULTS) (extended infusion is not appropriate)  4.5 g Intravenous Q8H    polyethylene glycol  17 g Oral BID     PRN Meds:bisacodyL, dextrose 10%, dextrose 10%, dextrose 10%, dextrose 10%, glucagon (human recombinant), glucose, glucose, HYDROmorphone, HYDROmorphone, insulin aspart U-100, loperamide, melatonin, naloxone, ondansetron, ondansetron, oxyCODONE, oxyCODONE, polyethylene glycol, prochlorperazine, prochlorperazine, sodium chloride 0.9%, sodium chloride 0.9%     Review of patient's allergies indicates:  No Known Allergies  Objective:     Vital Signs (Most Recent):  Temp: 99.9 °F (37.7 °C) (01/18/24 0648)  Pulse: 65 (01/18/24 0648)  Resp: 16 (01/18/24 0648)  BP: 130/62 (01/18/24 0648)  SpO2: (!) 93 % (01/18/24 0648) Vital Signs (24h Range):  Temp:  [97 °F (36.1 °C)-99.9 °F (37.7 °C)] 99.9 °F (37.7 °C)  Pulse:  [62-81] 65  Resp:  [16-18] 16  SpO2:  [92 %-94 %] 93 %  BP: (122-148)/(57-72) 130/62     Weight: 70.6 kg (155 lb 10.3 oz)  Body mass index is 25.9 kg/m².    Intake/Output - Last 3 Shifts         01/16 0700  01/17 0659 01/17 0700 01/18 0659 01/18 0700 01/19 0659    P.O. 555 840 120    I.V. (mL/kg) 2591.9 (36.7)      IV Piggyback  476.9     Total Intake(mL/kg) 3146.9 (44.6) 1316.9 (18.7) 120 (1.7)    Urine (mL/kg/hr) 300 (0.2) 5 (0)     Emesis/NG output       Drains 60 30     Other       Stool 0 3     Blood       Total Output 360 38     Net +2786.9 +1278.9 +120           Urine Occurrence 6 x 7 x     Stool Occurrence 4 x 6 x              Physical Exam  Constitutional:       General: He is not in  acute distress.     Appearance: Normal appearance.   HENT:      Head: Normocephalic and atraumatic.      Mouth/Throat:      Mouth: Mucous membranes are moist.   Eyes:      Extraocular Movements: Extraocular movements intact.   Cardiovascular:      Rate and Rhythm: Normal rate and regular rhythm.      Pulses: Normal pulses.   Pulmonary:      Effort: Pulmonary effort is normal.   Abdominal:      General: Abdomen is flat.      Tenderness: There is abdominal tenderness. There is no right CVA tenderness, left CVA tenderness or guarding.      Comments: No significant tenderness to palpation.  Incisions with clean dry dressings intact.  No distention, guarding, or rebound.  Drain with serous output.     Musculoskeletal:      Right lower leg: No edema.      Left lower leg: No edema.   Skin:     General: Skin is warm.   Neurological:      General: No focal deficit present.      Mental Status: He is alert and oriented to person, place, and time. Mental status is at baseline.   Psychiatric:         Mood and Affect: Mood normal.         Behavior: Behavior normal.         Thought Content: Thought content normal.          Significant Labs:  I have reviewed all pertinent lab results within the past 24 hours.  CBC:   Recent Labs   Lab 01/17/24  0452   WBC 7.71   RBC 4.18*   HGB 12.2*   HCT 36.1*      MCV 86   MCH 29.2   MCHC 33.8     CMP:   Recent Labs   Lab 01/18/24  0441   *   CALCIUM 7.7*   ALBUMIN 2.1*   PROT 5.8*      K 4.0   CO2 25      BUN 13   CREATININE 0.7   ALKPHOS 41*   *   AST 88*   BILITOT 1.1*       Significant Diagnostics:  I have reviewed all pertinent imaging results/findings within the past 24 hours.

## 2024-01-18 NOTE — PROGRESS NOTES
AVS virtually reviewed with patient ( ID: [id number])in its entirety with emphasis on medications, follow-up appointments and reasons to return to the ED or contact the [facility] On Call Nurse Care Line. Patient also encouraged to utilize their patient portal. Ease and convenience of use reiterated. Education complete and patient voiced understanding. All questions answered. Discharge teaching complete.

## 2024-01-18 NOTE — PLAN OF CARE
Problem: Adult Inpatient Plan of Care  Goal: Plan of Care Review  Outcome: Adequate for Care Transition  Goal: Patient-Specific Goal (Individualized)  Outcome: Adequate for Care Transition  Goal: Absence of Hospital-Acquired Illness or Injury  Outcome: Adequate for Care Transition  Goal: Optimal Comfort and Wellbeing  Outcome: Adequate for Care Transition  Goal: Readiness for Transition of Care  Outcome: Adequate for Care Transition     Problem: Diabetes Comorbidity  Goal: Blood Glucose Level Within Targeted Range  Outcome: Adequate for Care Transition     Problem: Fall Injury Risk  Goal: Absence of Fall and Fall-Related Injury  Outcome: Adequate for Care Transition     Problem: Pain Acute  Goal: Acceptable Pain Control and Functional Ability  Outcome: Adequate for Care Transition     Problem: Infection  Goal: Absence of Infection Signs and Symptoms  Outcome: Adequate for Care Transition     Problem: Impaired Wound Healing  Goal: Optimal Wound Healing  Outcome: Adequate for Care Transition

## 2024-01-19 ENCOUNTER — PATIENT OUTREACH (OUTPATIENT)
Dept: ADMINISTRATIVE | Facility: CLINIC | Age: 59
End: 2024-01-19
Payer: COMMERCIAL

## 2024-01-19 LAB
FINAL PATHOLOGIC DIAGNOSIS: NORMAL
GROSS: NORMAL
Lab: NORMAL

## 2024-01-19 NOTE — PROGRESS NOTES
C3 nurse attempted to contact Malou Suarez for a TCC post hospital discharge follow up call. No answer. Left voicemail via  Tyshawn with callback information. The patient has a scheduled [facility] appointment with Fred Dia MD on 01/25/24 @ 1400.

## 2024-01-22 NOTE — PROGRESS NOTES
3rd attempt for TCC Call; Left voicemail. Please call [phone] please leave first and last name and  for Nikolay. We will return your call.

## 2024-01-22 NOTE — PROGRESS NOTES
C3 nurse spoke with Malou Suarez's son Solomon for a TCC post hospital discharge follow up call.  Pt has surgical follow up with Maury Mack on 01/24/24 @ 1130. Per discharge AVS, patient has hospital follow up with Fred Dia MD on 01/25/24 @ 1400. Unable to verify in Epic. Message routed to PCP staff to confirm appt.

## 2024-01-23 NOTE — OP NOTE
.Laparoscopic Cholecystectomy Procedure Note    Pre-operative Diagnosis: Calculus of gallbladder with acute cholecystitis, without mention of obstruction    Post-operative Diagnosis: Same    Surgery Date: 1/15/2024     Surgeon: Maury Mack     Assistants: Horace    Anesthesia: General endotracheal anesthesia      Indications: This patient presents with symptomatic gallbladder disease and will undergo laparoscopic cholecystectomy.    The patient was seen again in the Holding Room. The risks and benefits, including but not limited to common bile duct injury, cystic duct stump leak, bleeding and infection were explained to the patient, who acknowledges these risks and wishes to proceed.    Procedure Details   The patient was taken to Operating Room, identified as Malou Suarez and the procedure verified as Laparoscopic Cholecystectomy. A Time Out was held and the above information confirmed.    Prior to the induction of general anesthesia, antibiotic prophylaxis was administered. General endotracheal anesthesia was then administered and tolerated well. After the induction, the abdomen was prepped in the usual sterile fashion. The patient was positioned in the supine position with the arms extended.    A vertical mid-line incision was made and carried down to the fascia.  The fascia was incised sharply, and the peritoneum was entered bluntly.  A Anton trocar was placed through the incision.  Gas was insufflated, establishing a pneumoperitoneum.  A 30 degree, 5 mm scope was then placed through the trocar and the abdominal cavity was explored.    The underlying bowel was inspected and found to be free of injury.  The remaining ports were placed under direct visualization.  All were 5 mm ports: one in the subxiphoid area, one in the right upper quadrant.    The gallbladder was grasped and retracted cranially and laterally exposing the triangle of Calot.  Dissection began on the neck of the gallbladder and continued  proximally until a ductal structure was identified.  A critical anterior and posterior view of the duct was obtained to confirm it was the cystic duct.  It was then doubly clipped and transected.  Continued dissection demonstrated the cystic artery. It was similarly clipped and transected.  The gallbladder was then dissected from the liver bed using electrocautery and removed through the umbilical trocar.  The dissection area was then inspected for hemostasis which was evident.  The dissection area was also inspected for a bile leak and bowel injury which was not evident.  The pneumoperitoneum was then expelled from the abdomen, and all ports were removed.  The umbilical fascia was reapproximated with a single figure-of-eight 0 vicryl suture.  The skin over all incisions were closed using 4-0 Vicryl deep dermal sutures.  A sterile dressing was applied.    Instrument, sponge, and needle counts were correct at closure and at the conclusion of the case.  There were no immediate complications.    Findings:  Cholecystitis with Cholelithiasis    Estimated Blood Loss:  250           Drains: 14 F cathryn drain gallbladder fossa           Total IV Fluids: see anesthesia records           Specimens: Gallbladder             Complications: None; patient tolerated the procedure well.           Disposition: PACU - hemodynamically stable.           Condition: stable

## 2024-01-24 ENCOUNTER — OFFICE VISIT (OUTPATIENT)
Dept: SURGERY | Facility: CLINIC | Age: 59
End: 2024-01-24
Payer: COMMERCIAL

## 2024-01-24 VITALS
BODY MASS INDEX: 24.77 KG/M2 | WEIGHT: 148.69 LBS | DIASTOLIC BLOOD PRESSURE: 74 MMHG | HEIGHT: 65 IN | SYSTOLIC BLOOD PRESSURE: 117 MMHG | HEART RATE: 62 BPM

## 2024-01-24 DIAGNOSIS — K81.0 CHOLECYSTITIS, ACUTE: ICD-10-CM

## 2024-01-24 DIAGNOSIS — R10.12 LEFT UPPER QUADRANT ABDOMINAL PAIN: ICD-10-CM

## 2024-01-24 PROCEDURE — 3074F SYST BP LT 130 MM HG: CPT | Mod: CPTII,S$GLB,, | Performed by: SURGERY

## 2024-01-24 PROCEDURE — 99999 PR PBB SHADOW E&M-EST. PATIENT-LVL IV: CPT | Mod: PBBFAC,,, | Performed by: SURGERY

## 2024-01-24 PROCEDURE — 3078F DIAST BP <80 MM HG: CPT | Mod: CPTII,S$GLB,, | Performed by: SURGERY

## 2024-01-24 PROCEDURE — 1159F MED LIST DOCD IN RCRD: CPT | Mod: CPTII,S$GLB,, | Performed by: SURGERY

## 2024-01-24 PROCEDURE — 3052F HG A1C>EQUAL 8.0%<EQUAL 9.0%: CPT | Mod: CPTII,S$GLB,, | Performed by: SURGERY

## 2024-01-24 PROCEDURE — 99024 POSTOP FOLLOW-UP VISIT: CPT | Mod: S$GLB,,, | Performed by: SURGERY

## 2024-01-24 NOTE — PROGRESS NOTES
Drain now with little to no output.    PE: abd soft benign, doing well    Plan: d/c drain     Gradually resume normal activity, gradually resume normal diet    F/u prn

## 2024-01-30 ENCOUNTER — OFFICE VISIT (OUTPATIENT)
Dept: CARDIOLOGY | Facility: CLINIC | Age: 59
End: 2024-01-30
Payer: COMMERCIAL

## 2024-01-30 ENCOUNTER — OFFICE VISIT (OUTPATIENT)
Dept: INTERNAL MEDICINE | Facility: CLINIC | Age: 59
End: 2024-01-30
Payer: COMMERCIAL

## 2024-01-30 VITALS
OXYGEN SATURATION: 98 % | WEIGHT: 149.25 LBS | RESPIRATION RATE: 18 BRPM | DIASTOLIC BLOOD PRESSURE: 62 MMHG | BODY MASS INDEX: 24.87 KG/M2 | HEIGHT: 65 IN | SYSTOLIC BLOOD PRESSURE: 118 MMHG | HEART RATE: 61 BPM

## 2024-01-30 VITALS
DIASTOLIC BLOOD PRESSURE: 74 MMHG | WEIGHT: 149.69 LBS | BODY MASS INDEX: 24.94 KG/M2 | SYSTOLIC BLOOD PRESSURE: 118 MMHG | HEIGHT: 65 IN | HEART RATE: 72 BPM

## 2024-01-30 DIAGNOSIS — Z09 HOSPITAL DISCHARGE FOLLOW-UP: Primary | ICD-10-CM

## 2024-01-30 DIAGNOSIS — R07.1 CHEST PAIN ON BREATHING: ICD-10-CM

## 2024-01-30 DIAGNOSIS — I73.9 PERIPHERAL VASCULAR DISEASE, UNSPECIFIED: Primary | ICD-10-CM

## 2024-01-30 DIAGNOSIS — R19.5 POSITIVE COLORECTAL CANCER SCREENING USING COLOGUARD TEST: ICD-10-CM

## 2024-01-30 DIAGNOSIS — R07.9 CHEST PAIN, UNSPECIFIED TYPE: ICD-10-CM

## 2024-01-30 PROCEDURE — 3052F HG A1C>EQUAL 8.0%<EQUAL 9.0%: CPT | Mod: CPTII,S$GLB,, | Performed by: INTERNAL MEDICINE

## 2024-01-30 PROCEDURE — 3008F BODY MASS INDEX DOCD: CPT | Mod: CPTII,S$GLB,, | Performed by: INTERNAL MEDICINE

## 2024-01-30 PROCEDURE — 1159F MED LIST DOCD IN RCRD: CPT | Mod: CPTII,S$GLB,, | Performed by: INTERNAL MEDICINE

## 2024-01-30 PROCEDURE — 3078F DIAST BP <80 MM HG: CPT | Mod: CPTII,S$GLB,,

## 2024-01-30 PROCEDURE — 99999 PR PBB SHADOW E&M-EST. PATIENT-LVL IV: CPT | Mod: PBBFAC,,, | Performed by: INTERNAL MEDICINE

## 2024-01-30 PROCEDURE — 1111F DSCHRG MED/CURRENT MED MERGE: CPT | Mod: CPTII,S$GLB,,

## 2024-01-30 PROCEDURE — 3052F HG A1C>EQUAL 8.0%<EQUAL 9.0%: CPT | Mod: CPTII,S$GLB,,

## 2024-01-30 PROCEDURE — 1111F DSCHRG MED/CURRENT MED MERGE: CPT | Mod: CPTII,S$GLB,, | Performed by: INTERNAL MEDICINE

## 2024-01-30 PROCEDURE — 3078F DIAST BP <80 MM HG: CPT | Mod: CPTII,S$GLB,, | Performed by: INTERNAL MEDICINE

## 2024-01-30 PROCEDURE — 99213 OFFICE O/P EST LOW 20 MIN: CPT | Mod: S$GLB,,,

## 2024-01-30 PROCEDURE — 3074F SYST BP LT 130 MM HG: CPT | Mod: CPTII,S$GLB,, | Performed by: INTERNAL MEDICINE

## 2024-01-30 PROCEDURE — 99214 OFFICE O/P EST MOD 30 MIN: CPT | Mod: S$GLB,,, | Performed by: INTERNAL MEDICINE

## 2024-01-30 PROCEDURE — 1160F RVW MEDS BY RX/DR IN RCRD: CPT | Mod: CPTII,S$GLB,,

## 2024-01-30 PROCEDURE — 3074F SYST BP LT 130 MM HG: CPT | Mod: CPTII,S$GLB,,

## 2024-01-30 PROCEDURE — 1159F MED LIST DOCD IN RCRD: CPT | Mod: CPTII,S$GLB,,

## 2024-01-30 PROCEDURE — 99999 PR PBB SHADOW E&M-EST. PATIENT-LVL IV: CPT | Mod: PBBFAC,,,

## 2024-01-30 PROCEDURE — 3008F BODY MASS INDEX DOCD: CPT | Mod: CPTII,S$GLB,,

## 2024-01-30 NOTE — PROGRESS NOTES
I have reviewed and concur with the resident's history, assessment, and plan. See below addendum for my evaluation and additional recommendations:    58 year old male with T2DM, HTN, HLD, PVD presenting for hospital discharge follow up. Recent admission due to cholecystitis, underwent lap ezequiel with drain placed. EKG ordered during admission showed inferior ischemia and prior septal infarct. Patient has been seen outpatient by Gen Surg since admission with drain pulled and noted good post-op course. Seen by cardiology this morning, will be undergoing a nuclear stress test. Patient denies symptoms. Almost done with post-op Augmentin, tolerating well. No complaints raised. Instructed to follow up with his PCP, Son ADAMSilviano Dia MD.     Patient is insulin-dependent T2DM, A1c 9% during admission with POCT glucose readings 120s-220s. T2DM managed by Endocrinologist at OSH, has appointment tomorrow.     Follow up with PCP. RTC PRN.       Ramo Negron MD  Family Medicine  [facility]  01/30/2024

## 2024-01-30 NOTE — PROGRESS NOTES
CARDIOVASCULAR CONSULTATION    REASON FOR CONSULT:   Malou Suarez is a 58 y.o. male who presents for   Chief Complaint   Patient presents with    Follow-up          HISTORY OF PRESENT ILLNESS:     History obtained with the help of Tessa .    Here for follow-up after recent hospital visit    Cardiology consult during hospital stay:  HPI:   Patient is a pleasant 58-year-old man.  History obtained with the help of .  Patient states that for the past few weeks he has been having left upper abdominal as well as left chest pain.  Chest pain occurs mostly when he lays down.  No particular aggravating or relieving factors.  He walks everywhere and has not noticed that pain.  General surgery was consulted because with left upper abdominal pain who ordered an EKG which was abnormal with inferior ischemia and old septal infarct and hence Cardiology consult was obtained.  Two tests of troponins have been negative.  Patient is EKG reviewed and as listed above.  Demonstrates inferior T-wave inversions as well as septal anterior Q-waves.  Denies orthopnea, PND.  Currently laying comfortably in bed.  Two sets of troponins have been normal.  Echo shows normal ef     Results for orders placed during the hospital encounter of 01/11/24     Echo     Interpretation Summary    Left Ventricle: The left ventricle is normal in size. Normal wall thickness. Normal wall motion. There is normal systolic function with a visually estimated ejection fraction of 55 - 60%. Grade I diastolic dysfunction.    Right Ventricle: Normal right ventricular cavity size. Systolic function is normal.    Left Atrium: Left atrium is moderately dilated.    Aortic Valve: The aortic valve is a trileaflet valve.    Mitral Valve: There is mild regurgitation.    Tricuspid Valve: There is mild regurgitation.    Pulmonary Artery: The estimated pulmonary artery systolic pressure is 15 mmHg.    IVC/SVC: Normal venous pressure at 3 mmHg.       Other chest  pain  Atypical chest pain in a patient with multiple risk factors for coronary artery disease and abnormal EKG.  Echo showed no large wall motion abnormalities.  Further evaluation with the help of stress test.      PAST MEDICAL HISTORY:     Past Medical History:   Diagnosis Date    Diabetes mellitus     Pulmonary emphysema 01/14/2024       PAST SURGICAL HISTORY:     Past Surgical History:   Procedure Laterality Date    ROBOT-ASSISTED CHOLECYSTECTOMY N/A 1/15/2024    Procedure: LAPAROSCOPIC CHOLECYSTECTOMY;  Surgeon: Maury Mack MD;  Location: [facility];  Service: General;  Laterality: N/A;       ALLERGIES AND MEDICATION:   Review of patient's allergies indicates:  No Known Allergies     Medication List            Accurate as of January 30, 2024 10:30 AM. If you have any questions, ask your nurse or doctor.                CONTINUE taking these medications      alogliptin-pioglitazone 12.5-15 mg Tab  Commonly known as: OSENI     atorvastatin 40 MG tablet  Commonly known as: LIPITOR  Take 1 tablet (40 mg total) by mouth once daily.     empagliflozin 10 mg tablet  Commonly known as: Jardiance     ergocalciferol 50,000 unit Cap  Commonly known as: ERGOCALCIFEROL     insulin aspart protamine-insulin aspart 100 unit/mL (70-30) Inpn pen  Commonly known as: NovoLOG 70/30     insulin glargine 100 unit/mL injection  Commonly known as: Lantus     JANUMET -1,000 mg Tm24  Generic drug: SITagliptan-metformin     losartan 25 MG tablet  Commonly known as: COZAAR     VEMLIDY 25 mg Tab  Generic drug: tenofovir alafenamide              SOCIAL HISTORY:     Social History     Socioeconomic History    Marital status:    Tobacco Use    Smoking status: Former   Substance and Sexual Activity    Alcohol use: No    Drug use: No       FAMILY HISTORY:   No family history on file.    REVIEW OF SYSTEMS:   Review of Systems   Constitutional: Negative.   HENT: Negative.     Eyes: Negative.    Cardiovascular:  Positive for chest  "pain.   Respiratory: Negative.     Endocrine: Negative.    Hematologic/Lymphatic: Negative.    Skin: Negative.    Musculoskeletal: Negative.    Gastrointestinal: Negative.    Genitourinary: Negative.    Neurological: Negative.    Psychiatric/Behavioral: Negative.     Allergic/Immunologic: Negative.        A 10 point review of systems was performed and all the pertinent positives have been mentioned. Rest of review of systems was negative.        PHYSICAL EXAM:     Vitals:    01/30/24 0902   BP: 118/62   Pulse: 61   Resp: 18    Body mass index is 24.84 kg/m².  Weight: 67.7 kg (149 lb 4 oz)   Height: 5' 5" (165.1 cm)     Physical Exam  Vitals reviewed.   Constitutional:       Appearance: He is well-developed.   HENT:      Head: Normocephalic.   Eyes:      Conjunctiva/sclera: Conjunctivae normal.      Pupils: Pupils are equal, round, and reactive to light.   Cardiovascular:      Rate and Rhythm: Normal rate and regular rhythm.      Heart sounds: Normal heart sounds.   Pulmonary:      Effort: Pulmonary effort is normal.      Breath sounds: Normal breath sounds.   Abdominal:      General: Bowel sounds are normal.      Palpations: Abdomen is soft.   Musculoskeletal:      Cervical back: Normal range of motion and neck supple.   Skin:     General: Skin is warm.   Neurological:      Mental Status: He is alert and oriented to person, place, and time.           DATA:     Laboratory:  CBC:  Recent Labs   Lab 01/16/24  0333 01/16/24  0811 01/17/24  0452   WBC 7.32 8.11 7.71   Hemoglobin 12.7 L 12.0 L 12.2 L   Hematocrit 38.3 L 36.0 L 36.1 L   Platelets 187 172 208       CHEMISTRIES:  Recent Labs   Lab 01/14/24  0530 01/15/24  0523 01/16/24  0333 01/17/24  0452 01/18/24  0441   Glucose 196 H 160 H 213 H 181 H 159 H   Sodium 138 135 L 136 135 L 136   Potassium 3.6 4.1 4.7 3.9 4.0   BUN 23 H 27 H 23 H 20 13   Creatinine 0.8 0.8 0.7 0.6 0.7   Calcium 8.4 L 8.7 8.4 L 8.5 L 7.7 L   Magnesium 1.6 2.0 2.1  --   --        CARDIAC " BIOMARKERS:  Recent Labs   Lab 01/11/24  2300 01/12/24  1220 01/13/24  1809   Troponin I 0.006 <0.006 <0.006       COAGS:        LIPIDS/LFTS:  Recent Labs   Lab 01/16/24  0333 01/17/24  0452 01/18/24  0441   AST 86 H 113 H 88 H   ALT 89 H 132 H 121 H       Hemoglobin A1C   Date Value Ref Range Status   01/14/2024 9.0 (H) 4.0 - 5.6 % Final     Comment:     ADA Screening Guidelines:  5.7-6.4%  Consistent with prediabetes  >or=6.5%  Consistent with diabetes    High levels of fetal hemoglobin interfere with the HbA1C  assay. Heterozygous hemoglobin variants (HbS, HgC, etc)do  not significantly interfere with this assay.   However, presence of multiple variants may affect accuracy.         TSH        The ASCVD Risk score (Sunita CARDENAS, et al., 2019) failed to calculate for the following reasons:    Cannot find a previous HDL lab    Cannot find a previous total cholesterol lab       BNP    Lab Results   Component Value Date/Time    BNP <10 11/24/2015 11:07 AM            ECHO    Results for orders placed during the hospital encounter of 01/11/24    Echo    Interpretation Summary    Left Ventricle: The left ventricle is normal in size. Normal wall thickness. Normal wall motion. There is normal systolic function with a visually estimated ejection fraction of 55 - 60%. Grade I diastolic dysfunction.    Right Ventricle: Normal right ventricular cavity size. Systolic function is normal.    Left Atrium: Left atrium is moderately dilated.    Aortic Valve: The aortic valve is a trileaflet valve.    Mitral Valve: There is mild regurgitation.    Tricuspid Valve: There is mild regurgitation.    Pulmonary Artery: The estimated pulmonary artery systolic pressure is 15 mmHg.    IVC/SVC: Normal venous pressure at 3 mmHg.      STRESS TEST    No results found for this or any previous visit.        CATH    No results found for this or any previous visit.        ASSESSMENT AND PLAN     Patient Active Problem List   Diagnosis    Chest pain on  breathing    Bronchitis    Left upper quadrant pain    Other chest pain    Positive colorectal cancer screening using Cologuard test    Hypertension    Type 2 diabetes mellitus    Acute left-sided thoracic back pain    Hyperlipidemia    Elevated d-dimer    Pulmonary emphysema    Cholecystitis, acute    Peripheral vascular disease, unspecified         Orders Placed This Encounter   Procedures    Nuclear Stress - Cardiology Interpreted       Follow-up after stress test.      Thank you very much for involving me in the care of your patient.  Please do not hesitate to contact me if there are any questions.      Jordon Pavon MD, FACC, [facility]  Interventional Cardiologist, [facility].           This note was dictated with the help of speech recognition software.  There might be un-intended errors and/or substitutions.

## 2024-01-30 NOTE — PATIENT INSTRUCTIONS
Scheduling number: [phone]    I would likely to schedule an appointment for screening colonoscopy.     What is the earliest time to schedule?     Is there anyone near [place] I can make an appointment with?

## 2024-01-30 NOTE — PROGRESS NOTES
[facility]  CLINIC NOTE    Name: Malou Suarez  : 1965  Date of Service: 2024   PCP: Fred Dia MD    PRESENTING HISTORY       History of Present Illness:  Mr. Malou Suarez is a 58 y.o. male with a medical history of:     HTN, HLD, T2DM here for hospital follow up. Recently was hospitalized on 24.  Patient was complaining left sided chest pain and quadrant abdominal pain.  CT scan obtained showing air filled gallbladder concerning for emphysematous cholecystitis and was emergently taken to the OR. Patient is now s/p lap ezequiel along with drain placement. Patient had signs of significant inflammation and necrosis of the gallbladder.  Recently saw Dr. Mack  with removal of drain. Discharged home w/ Augmentin for 10 days. EKG did revealed inferior ischemia and old septal infarct. Cardiology was consulted during that admission. Cardiac workup was negative.  Saw Cardiology today and a stress test was ordered.      Patient had elevated LFTs was downtrending prior to discharge.     Went to see cardiology, general surgery.   Will see endocrine tomorrow for blood work.       Review of Systems:   Constitution: Negative for fever, chills, weight loss or gain.   HENT: Negative for sore throat, rhinorrhea, or headache.  Eyes: Negative for blurred or double vision.   Cardiovascular: Negative for chest pain, leg swelling, palpitations  Pulmonary: Negative for SOB, cough   Gastrointestinal: Negative for abdominal pain, nausea, vomiting, or diarrhea.   : Negative for dysuria.   Neurological: Negative for focal weakness or sensory changes.    Labs:     Lab Results   Component Value Date     2024    K 4.0 2024     2024    CO2 25 2024    BUN 13 2024    CREATININE 0.7 2024    ANIONGAP 9 2024     Lab Results   Component Value Date    HGBA1C 9.0 (H) 2024    Lab Results   Component Value Date    WBC 7.71 2024    HGB 12.2 (L)  "01/17/2024    HCT 36.1 (L) 01/17/2024     01/17/2024    GRAN 60.0 01/17/2024    GRAN 6.3 01/16/2024     No results found for: "CHOL", "HDL", "LDLCALC", "TRIG"              PAST HISTORY:     Past Medical History:   Diagnosis Date    Diabetes mellitus     Pulmonary emphysema 01/14/2024       Past Surgical History:   Procedure Laterality Date    ROBOT-ASSISTED CHOLECYSTECTOMY N/A 1/15/2024    Procedure: LAPAROSCOPIC CHOLECYSTECTOMY;  Surgeon: Maury Mack MD;  Location: [facility];  Service: General;  Laterality: N/A;       No family history on file.    Social History     Socioeconomic History    Marital status:    Tobacco Use    Smoking status: Former   Substance and Sexual Activity    Alcohol use: No    Drug use: No       MEDICATIONS & ALLERGIES:     Current Outpatient Medications on File Prior to Visit   Medication Sig    alogliptin-pioglitazone 12.5-15 mg Tab Take by mouth once daily.    atorvastatin (LIPITOR) 40 MG tablet Take 1 tablet (40 mg total) by mouth once daily.    empagliflozin 10 mg Tab Take 10 mg by mouth once daily.    ergocalciferol (ERGOCALCIFEROL) 50,000 unit Cap Take 50,000 Units by mouth every 7 days.    insulin aspart protamine-insulin aspart (NOVOLOG 70/30) 100 unit/mL (70-30) InPn pen Inject into the skin 2 (two) times daily before meals.    insulin glargine (LANTUS) 100 unit/mL injection Inject into the skin every evening.    JANUMET -1,000 mg TM24 Take 1 tablet by mouth every morning.    losartan (COZAAR) 25 MG tablet Take 25 mg by mouth once daily.    VEMLIDY 25 mg Tab Take 1 tablet by mouth.     No current facility-administered medications on file prior to visit.       Review of patient's allergies indicates:  No Known Allergies    OBJECTIVE:   Vital Signs:  Vitals:    01/30/24 1545   BP: 118/74   Pulse: 72   Weight: 67.9 kg (149 lb 11.1 oz)   Height: 5' 5" (1.651 m)       No results found for this or any previous visit (from the past 24 hour(s)). "     Gen/Constitutional: No acute distress  Head: Normocephalic, Atraumatic  Neck: supple, no masses or LAD, no JVD  Eyes:conjunctiva clear  Ears, Nose and Throat: No rhinorrhea  Cardiac:  Regular rate, Reg Rhythm, No murmur  Pulmonary: CTA Bilat, no wheezes, rhonchi, rales.  No increased work of breathing.  GI: Abdomen soft, non-tender, non-distended; no rebound or guarding.   : No CVA tenderness.  Musculoskeletal: Extremities warm, well perfused, no erythema, no edema  Skin: No rashes, cyanosis or jaundice.  Laparoscopic incisions are well healed, no active drainage, no tenderness.  They are still Steri-Strips in place.  Clean  Neuro: Alert and Oriented x 3; No focal motor or sensory deficits.    Psych: Normal affect      ASSESSMENT & PLAN:     1. Hospital discharge follow-up  -     COMPREHENSIVE METABOLIC PANEL; Future; Expected date: 02/06/2024  -     CBC W/ AUTO DIFFERENTIAL; Future; Expected date: 02/06/2024    2. Positive colorectal cancer screening using Cologuard test  -     Ambulatory referral/consult to Internal Medicine  -     Ambulatory referral/consult to Endo Procedure ; Future; Expected date: 01/31/2024      58-year-old male here for hospital follow up.  No acute complaints today.  Educated patient the importance of finishing his oral antibiotics.  Patient saw General surgery, that his drain taken out.  Follow up as PRN.  Patient follow up with Cardiology, he is scheduled for a nuclear stress test on 02/05.  Downtrending LFTs prior to discharge.  Order CBC, CMP.  Patient with type 2 diabetes, Follow closely by endocrinology.  Patient was inquiring about colonoscopy.  Colonoscopy referral was placed along with scheduling instructions.    Discussed with Dr. Negron    RTC prn w/ Dr. Ifeoma Dia, DO   Internal Medicine PGY-1  [facility]

## 2024-02-02 ENCOUNTER — TELEPHONE (OUTPATIENT)
Dept: ENDOSCOPY | Facility: HOSPITAL | Age: 59
End: 2024-02-02
Payer: COMMERCIAL

## 2024-02-02 ENCOUNTER — EXTERNAL HOME HEALTH (OUTPATIENT)
Dept: HOME HEALTH SERVICES | Facility: HOSPITAL | Age: 59
End: 2024-02-02
Payer: COMMERCIAL

## 2024-02-05 ENCOUNTER — HOSPITAL ENCOUNTER (OUTPATIENT)
Dept: RADIOLOGY | Facility: HOSPITAL | Age: 59
Discharge: HOME | End: 2024-02-05
Attending: INTERNAL MEDICINE
Payer: COMMERCIAL

## 2024-02-05 ENCOUNTER — HOSPITAL ENCOUNTER (OUTPATIENT)
Dept: CARDIOLOGY | Facility: HOSPITAL | Age: 59
Discharge: HOME | End: 2024-02-05
Attending: INTERNAL MEDICINE
Payer: COMMERCIAL

## 2024-02-05 DIAGNOSIS — R07.9 CHEST PAIN, UNSPECIFIED TYPE: ICD-10-CM

## 2024-02-05 LAB
CV STRESS BASE HR: 63 BPM
DIASTOLIC BLOOD PRESSURE: 80 MMHG
NUC STRESS DIASTOLIC VOLUME INDEX: 75
NUC STRESS EJECTION FRACTION: 64 %
NUC STRESS SYSTOLIC VOLUME INDEX: 27
OHS CV CPX 1 MINUTE RECOVERY HEART RATE: 129 BPM
OHS CV CPX 85 PERCENT MAX PREDICTED HEART RATE MALE: 137
OHS CV CPX ESTIMATED METS: 13
OHS CV CPX MAX PREDICTED HEART RATE: 161
OHS CV CPX PATIENT IS FEMALE: 0
OHS CV CPX PATIENT IS MALE: 1
OHS CV CPX PEAK DIASTOLIC BLOOD PRESSURE: 70 MMHG
OHS CV CPX PEAK HEAR RATE: 150 BPM
OHS CV CPX PEAK RATE PRESSURE PRODUCT: NORMAL
OHS CV CPX PEAK SYSTOLIC BLOOD PRESSURE: 205 MMHG
OHS CV CPX PERCENT MAX PREDICTED HEART RATE ACHIEVED: 93
OHS CV CPX RATE PRESSURE PRODUCT PRESENTING: 8190
STRESS ECHO POST EXERCISE DUR MIN: 10 MINUTES
STRESS ECHO POST EXERCISE DUR SEC: 58 SECONDS
STRESS ST DEPRESSION: 1 MM
SYSTOLIC BLOOD PRESSURE: 130 MMHG

## 2024-02-05 PROCEDURE — 93017 CV STRESS TEST TRACING ONLY: CPT

## 2024-02-05 PROCEDURE — A9502 TC99M TETROFOSMIN: HCPCS

## 2024-02-05 PROCEDURE — 93018 CV STRESS TEST I&R ONLY: CPT | Mod: ,,, | Performed by: INTERNAL MEDICINE

## 2024-02-05 PROCEDURE — 93016 CV STRESS TEST SUPVJ ONLY: CPT | Mod: ,,, | Performed by: INTERNAL MEDICINE

## 2024-02-05 PROCEDURE — 78452 HT MUSCLE IMAGE SPECT MULT: CPT | Mod: 26,,, | Performed by: INTERNAL MEDICINE

## 2024-02-06 ENCOUNTER — TELEPHONE (OUTPATIENT)
Dept: ENDOSCOPY | Facility: HOSPITAL | Age: 59
End: 2024-02-06
Payer: COMMERCIAL

## 2024-02-07 ENCOUNTER — TELEPHONE (OUTPATIENT)
Dept: ENDOSCOPY | Facility: HOSPITAL | Age: 59
End: 2024-02-07
Payer: COMMERCIAL

## 2024-02-07 VITALS — HEIGHT: 65 IN | WEIGHT: 149 LBS | BODY MASS INDEX: 24.83 KG/M2

## 2024-02-07 DIAGNOSIS — R19.5 POSITIVE COLORECTAL CANCER SCREENING USING COLOGUARD TEST: ICD-10-CM

## 2024-02-07 DIAGNOSIS — Z12.11 SPECIAL SCREENING FOR MALIGNANT NEOPLASMS, COLON: Primary | ICD-10-CM

## 2024-02-07 NOTE — TELEPHONE ENCOUNTER
Contacted patient to schedule colonoscopy using language line.  ID# [id number]. Phone disconnected in middle of call.  with ID# [id number] assisted with new call.

## 2024-02-07 NOTE — TELEPHONE ENCOUNTER
----- Message from Ange Vu RN sent at 2/7/2024  9:52 AM CST -----  Regarding: 3/11 CL  The patient is currently under an internal cardiologist Jordon Pavon MD care and requires a clearance Cardiology for their upcoming scheduled Colonoscopy on 3/11/24.       Notes: patient had stress test on 1/30/24; follow up visit for results on 2/29/24

## 2024-02-07 NOTE — TELEPHONE ENCOUNTER
Spoke to patient to schedule procedure(s) Colonoscopy       Physician to perform procedure(s) Dr. VAL Gray  Date of Procedure (s) 3/11/24  Arrival Time 10:30 AM  Time of Procedure(s) 11:30 AM   Location of Procedure(s) [facility] 2nd Floor--  Enter at the rear of the building through the emergency department screening station or the Outpatient Registration door, then continue to endoscopy department on the 2nd floor.    Type of Rx Prep sent to patient: PEG  Instructions provided to patient via Postal Mail    Patient was informed on the following information and verbalized understanding. Screening questionnaire reviewed with patient and complete. If procedure requires anesthesia, a responsible adult needs to be present to accompany the patient home, patient cannot drive after receiving anesthesia. Appointment details are tentative, especially check-in time. Patient will receive a prep-op call 7 days prior to confirm check-in time for procedure. If applicable the patient should contact their pharmacy to verify Rx for procedure prep is ready for pick-up. Patient was advised to call the scheduling department at [phone] if pharmacy states no Rx is available. Patient was advised to call the endoscopy scheduling department if any questions or concerns arise.      [facility] Endoscopy Scheduling Department

## 2024-02-29 ENCOUNTER — OFFICE VISIT (OUTPATIENT)
Dept: CARDIOLOGY | Facility: CLINIC | Age: 59
End: 2024-02-29
Payer: COMMERCIAL

## 2024-02-29 VITALS
RESPIRATION RATE: 18 BRPM | WEIGHT: 156.31 LBS | SYSTOLIC BLOOD PRESSURE: 100 MMHG | HEART RATE: 65 BPM | HEIGHT: 65 IN | DIASTOLIC BLOOD PRESSURE: 54 MMHG | OXYGEN SATURATION: 97 % | BODY MASS INDEX: 26.04 KG/M2

## 2024-02-29 DIAGNOSIS — R07.9 CHEST PAIN, UNSPECIFIED TYPE: ICD-10-CM

## 2024-02-29 DIAGNOSIS — I73.9 PERIPHERAL VASCULAR DISEASE, UNSPECIFIED: Primary | ICD-10-CM

## 2024-02-29 DIAGNOSIS — R07.1 CHEST PAIN ON BREATHING: ICD-10-CM

## 2024-02-29 PROCEDURE — 99214 OFFICE O/P EST MOD 30 MIN: CPT | Mod: S$GLB,,, | Performed by: INTERNAL MEDICINE

## 2024-02-29 PROCEDURE — 99999 PR PBB SHADOW E&M-EST. PATIENT-LVL III: CPT | Mod: PBBFAC,,, | Performed by: INTERNAL MEDICINE

## 2024-02-29 PROCEDURE — 4010F ACE/ARB THERAPY RXD/TAKEN: CPT | Mod: CPTII,S$GLB,, | Performed by: INTERNAL MEDICINE

## 2024-02-29 PROCEDURE — 3078F DIAST BP <80 MM HG: CPT | Mod: CPTII,S$GLB,, | Performed by: INTERNAL MEDICINE

## 2024-02-29 PROCEDURE — 3074F SYST BP LT 130 MM HG: CPT | Mod: CPTII,S$GLB,, | Performed by: INTERNAL MEDICINE

## 2024-02-29 PROCEDURE — 1159F MED LIST DOCD IN RCRD: CPT | Mod: CPTII,S$GLB,, | Performed by: INTERNAL MEDICINE

## 2024-02-29 PROCEDURE — 3052F HG A1C>EQUAL 8.0%<EQUAL 9.0%: CPT | Mod: CPTII,S$GLB,, | Performed by: INTERNAL MEDICINE

## 2024-02-29 PROCEDURE — 3008F BODY MASS INDEX DOCD: CPT | Mod: CPTII,S$GLB,, | Performed by: INTERNAL MEDICINE

## 2024-02-29 NOTE — PROGRESS NOTES
CARDIOVASCULAR CONSULTATION    REASON FOR CONSULT:   Malou Suarez is a 58 y.o. male who presents for   Chief Complaint   Patient presents with    Results          HISTORY OF PRESENT ILLNESS:     History obtained with the help of Tessa .    Here for follow-up after recent hospital visit    Cardiology consult during hospital stay:  HPI:   Patient is a pleasant 58-year-old man.  History obtained with the help of .  Patient states that for the past few weeks he has been having left upper abdominal as well as left chest pain.  Chest pain occurs mostly when he lays down.  No particular aggravating or relieving factors.  He walks everywhere and has not noticed that pain.  General surgery was consulted because with left upper abdominal pain who ordered an EKG which was abnormal with inferior ischemia and old septal infarct and hence Cardiology consult was obtained.  Two tests of troponins have been negative.  Patient is EKG reviewed and as listed above.  Demonstrates inferior T-wave inversions as well as septal anterior Q-waves.  Denies orthopnea, PND.  Currently laying comfortably in bed.  Two sets of troponins have been normal.  Echo shows normal ef     Results for orders placed during the hospital encounter of 01/11/24     Echo     Interpretation Summary    Left Ventricle: The left ventricle is normal in size. Normal wall thickness. Normal wall motion. There is normal systolic function with a visually estimated ejection fraction of 55 - 60%. Grade I diastolic dysfunction.    Right Ventricle: Normal right ventricular cavity size. Systolic function is normal.    Left Atrium: Left atrium is moderately dilated.    Aortic Valve: The aortic valve is a trileaflet valve.    Mitral Valve: There is mild regurgitation.    Tricuspid Valve: There is mild regurgitation.    Pulmonary Artery: The estimated pulmonary artery systolic pressure is 15 mmHg.    IVC/SVC: Normal venous pressure at 3 mmHg.       Other chest  pain  Atypical chest pain in a patient with multiple risk factors for coronary artery disease and abnormal EKG.  Echo showed no large wall motion abnormalities.  Further evaluation with the help of stress test.        Feb 24:  History obtained with the help of a Albanian .  No anginal sounding chest pains.  Stress test did not show any significant ischemia.      Results for orders placed during the hospital encounter of 02/05/24    Nuclear Stress - Cardiology Interpreted    Interpretation Summary    The patient exercised for 10 minutes 58 seconds on a [place] protocol, corresponding to a functional capacity of 13 METS, achieving a peak heart rate of 150 bpm, which is 93 % of the age predicted maximum heart rate.    Normal myocardial perfusion scan. There is no evidence of myocardial ischemia or infarction.    There is a  mild intensity fixed perfusion abnormality in the inferior wall of the left ventricle secondary to diaphragm attenuation.    The gated perfusion images showed an ejection fraction of 64% post stress.    There is normal wall motion post stress.    The ECG portion of the study is positive for ischemia, 1mm horizontal ST depression. Specificity is reduced secondary to hypertensive response.    The patient reported no chest pain during the stress test.    There were no arrhythmias during stress.        PAST MEDICAL HISTORY:     Past Medical History:   Diagnosis Date    Diabetes mellitus     Pulmonary emphysema 01/14/2024       PAST SURGICAL HISTORY:     Past Surgical History:   Procedure Laterality Date    ROBOT-ASSISTED CHOLECYSTECTOMY N/A 1/15/2024    Procedure: LAPAROSCOPIC CHOLECYSTECTOMY;  Surgeon: Maury Mack MD;  Location: [facility];  Service: General;  Laterality: N/A;       ALLERGIES AND MEDICATION:   Review of patient's allergies indicates:  No Known Allergies     Medication List            Accurate as of February 29, 2024  3:40 PM. If you have any questions, ask your nurse or  "doctor.                CONTINUE taking these medications      alogliptin-pioglitazone 12.5-15 mg Tab  Commonly known as: OSENI     atorvastatin 40 MG tablet  Commonly known as: LIPITOR  Take 1 tablet (40 mg total) by mouth once daily.     empagliflozin 10 mg tablet  Commonly known as: Jardiance     ergocalciferol 50,000 unit Cap  Commonly known as: ERGOCALCIFEROL     insulin aspart protamine-insulin aspart 100 unit/mL (70-30) Inpn pen  Commonly known as: NovoLOG 70/30     insulin glargine 100 unit/mL injection  Commonly known as: Lantus     JANUMET -1,000 mg Tm24  Generic drug: SITagliptan-metformin     losartan 25 MG tablet  Commonly known as: COZAAR     VEMLIDY 25 mg Tab  Generic drug: tenofovir alafenamide              SOCIAL HISTORY:     Social History     Socioeconomic History    Marital status:    Tobacco Use    Smoking status: Former   Substance and Sexual Activity    Alcohol use: No    Drug use: No       FAMILY HISTORY:   No family history on file.    REVIEW OF SYSTEMS:   Review of Systems   Constitutional: Negative.   HENT: Negative.     Eyes: Negative.    Cardiovascular:  Positive for chest pain.   Respiratory: Negative.     Endocrine: Negative.    Hematologic/Lymphatic: Negative.    Skin: Negative.    Musculoskeletal: Negative.    Gastrointestinal: Negative.    Genitourinary: Negative.    Neurological: Negative.    Psychiatric/Behavioral: Negative.     Allergic/Immunologic: Negative.        A 10 point review of systems was performed and all the pertinent positives have been mentioned. Rest of review of systems was negative.        PHYSICAL EXAM:     Vitals:    02/29/24 1010   BP: (!) 100/54   Pulse: 65   Resp: 18    Body mass index is 26.01 kg/m².  Weight: 70.9 kg (156 lb 4.9 oz)   Height: 5' 5" (165.1 cm)     Physical Exam  Vitals reviewed.   Constitutional:       Appearance: He is well-developed.   HENT:      Head: Normocephalic.   Eyes:      Conjunctiva/sclera: Conjunctivae normal.      " Pupils: Pupils are equal, round, and reactive to light.   Cardiovascular:      Rate and Rhythm: Normal rate and regular rhythm.      Heart sounds: Normal heart sounds.   Pulmonary:      Effort: Pulmonary effort is normal.      Breath sounds: Normal breath sounds.   Abdominal:      General: Bowel sounds are normal.      Palpations: Abdomen is soft.   Musculoskeletal:      Cervical back: Normal range of motion and neck supple.   Skin:     General: Skin is warm.   Neurological:      Mental Status: He is alert and oriented to person, place, and time.           DATA:     Laboratory:  CBC:  Recent Labs   Lab 01/16/24  0333 01/16/24  0811 01/17/24  0452   WBC 7.32 8.11 7.71   Hemoglobin 12.7 L 12.0 L 12.2 L   Hematocrit 38.3 L 36.0 L 36.1 L   Platelets 187 172 208       CHEMISTRIES:  Recent Labs   Lab 01/14/24  0530 01/15/24  0523 01/16/24  0333 01/17/24  0452 01/18/24  0441   Glucose 196 H 160 H 213 H 181 H 159 H   Sodium 138 135 L 136 135 L 136   Potassium 3.6 4.1 4.7 3.9 4.0   BUN 23 H 27 H 23 H 20 13   Creatinine 0.8 0.8 0.7 0.6 0.7   Calcium 8.4 L 8.7 8.4 L 8.5 L 7.7 L   Magnesium 1.6 2.0 2.1  --   --        CARDIAC BIOMARKERS:  Recent Labs   Lab 01/11/24  2300 01/12/24  1220 01/13/24  1809   Troponin I 0.006 <0.006 <0.006       COAGS:        LIPIDS/LFTS:  Recent Labs   Lab 01/16/24  0333 01/17/24  0452 01/18/24  0441   AST 86 H 113 H 88 H   ALT 89 H 132 H 121 H       Hemoglobin A1C   Date Value Ref Range Status   01/14/2024 9.0 (H) 4.0 - 5.6 % Final     Comment:     ADA Screening Guidelines:  5.7-6.4%  Consistent with prediabetes  >or=6.5%  Consistent with diabetes    High levels of fetal hemoglobin interfere with the HbA1C  assay. Heterozygous hemoglobin variants (HbS, HgC, etc)do  not significantly interfere with this assay.   However, presence of multiple variants may affect accuracy.         TSH        The ASCVD Risk score (Sunita DK, et al., 2019) failed to calculate for the following reasons:    Cannot find a  previous HDL lab    Cannot find a previous total cholesterol lab       BNP    Lab Results   Component Value Date/Time    BNP <10 11/24/2015 11:07 AM            ECHO    Results for orders placed during the hospital encounter of 01/11/24    Echo    Interpretation Summary    Left Ventricle: The left ventricle is normal in size. Normal wall thickness. Normal wall motion. There is normal systolic function with a visually estimated ejection fraction of 55 - 60%. Grade I diastolic dysfunction.    Right Ventricle: Normal right ventricular cavity size. Systolic function is normal.    Left Atrium: Left atrium is moderately dilated.    Aortic Valve: The aortic valve is a trileaflet valve.    Mitral Valve: There is mild regurgitation.    Tricuspid Valve: There is mild regurgitation.    Pulmonary Artery: The estimated pulmonary artery systolic pressure is 15 mmHg.    IVC/SVC: Normal venous pressure at 3 mmHg.      STRESS TEST    No results found for this or any previous visit.        CATH    No results found for this or any previous visit.        ASSESSMENT AND PLAN     Patient Active Problem List   Diagnosis    Chest pain on breathing    Bronchitis    Left upper quadrant pain    Other chest pain    Positive colorectal cancer screening using Cologuard test    Hypertension    Type 2 diabetes mellitus    Acute left-sided thoracic back pain    Hyperlipidemia    Elevated d-dimer    Pulmonary emphysema    Cholecystitis, acute    Peripheral vascular disease, unspecified         No orders of the defined types were placed in this encounter.    Atypical chest pains.  Stress test did not show any significant ischemia.  No further ischemic workup indicated at current time.  Follow-up in 6 months    Thank you very much for involving me in the care of your patient.  Please do not hesitate to contact me if there are any questions.      Jordon Pavon MD, FACC, [facility]  Interventional Cardiologist, [facility].           This note was  dictated with the help of speech recognition software.  There might be un-intended errors and/or substitutions.

## 2024-03-05 ENCOUNTER — TELEPHONE (OUTPATIENT)
Dept: ENDOSCOPY | Facility: HOSPITAL | Age: 59
End: 2024-03-05
Payer: COMMERCIAL

## 2024-03-05 NOTE — TELEPHONE ENCOUNTER
Left voicemail via language line  (#[phone]) and sent portal message for patient to call Endoscopy Scheduling to review instructions and confirm appointment for Colonoscopy on 3/11/24.

## 2024-03-07 ENCOUNTER — TELEPHONE (OUTPATIENT)
Dept: ENDOSCOPY | Facility: HOSPITAL | Age: 59
End: 2024-03-07
Payer: COMMERCIAL

## 2024-03-07 NOTE — TELEPHONE ENCOUNTER
Left 2nd voicemail via language line  [phone] for patient to call Endoscopy Scheduling to review instructions and confirm appointment for Colonoscopy on 3/11/24.

## 2024-03-10 ENCOUNTER — ANESTHESIA EVENT (OUTPATIENT)
Dept: ENDOSCOPY | Facility: HOSPITAL | Age: 59
End: 2024-03-10
Payer: COMMERCIAL

## 2024-03-10 RX ORDER — LIDOCAINE HYDROCHLORIDE 10 MG/ML
1 INJECTION, SOLUTION EPIDURAL; INFILTRATION; INTRACAUDAL; PERINEURAL ONCE
Status: CANCELLED | OUTPATIENT
Start: 2024-03-10 | End: 2024-03-10

## 2024-03-11 ENCOUNTER — HOSPITAL ENCOUNTER (OUTPATIENT)
Facility: HOSPITAL | Age: 59
Discharge: HOME | End: 2024-03-11
Attending: INTERNAL MEDICINE | Admitting: INTERNAL MEDICINE
Payer: COMMERCIAL

## 2024-03-11 ENCOUNTER — ANESTHESIA (OUTPATIENT)
Dept: ENDOSCOPY | Facility: HOSPITAL | Age: 59
End: 2024-03-11
Payer: COMMERCIAL

## 2024-03-11 VITALS
DIASTOLIC BLOOD PRESSURE: 72 MMHG | OXYGEN SATURATION: 98 % | TEMPERATURE: 98 F | HEART RATE: 56 BPM | RESPIRATION RATE: 15 BRPM | SYSTOLIC BLOOD PRESSURE: 137 MMHG

## 2024-03-11 DIAGNOSIS — R19.5 POSITIVE COLORECTAL CANCER SCREENING USING COLOGUARD TEST: ICD-10-CM

## 2024-03-11 LAB — POCT GLUCOSE: 162 MG/DL (ref 70–110)

## 2024-03-11 PROCEDURE — D9220A PRA ANESTHESIA: Mod: CRNA,,, | Performed by: STUDENT IN AN ORGANIZED HEALTH CARE EDUCATION/TRAINING PROGRAM

## 2024-03-11 PROCEDURE — G0121 COLON CA SCRN NOT HI RSK IND: HCPCS | Performed by: INTERNAL MEDICINE

## 2024-03-11 PROCEDURE — 37000008 HC ANESTHESIA 1ST 15 MINUTES: Performed by: INTERNAL MEDICINE

## 2024-03-11 PROCEDURE — D9220A PRA ANESTHESIA: Mod: ANES,,, | Performed by: ANESTHESIOLOGY

## 2024-03-11 PROCEDURE — 63600175 PHARM REV CODE 636 W HCPCS: Performed by: STUDENT IN AN ORGANIZED HEALTH CARE EDUCATION/TRAINING PROGRAM

## 2024-03-11 PROCEDURE — 25000003 PHARM REV CODE 250: Performed by: STUDENT IN AN ORGANIZED HEALTH CARE EDUCATION/TRAINING PROGRAM

## 2024-03-11 PROCEDURE — 82962 GLUCOSE BLOOD TEST: CPT | Performed by: INTERNAL MEDICINE

## 2024-03-11 PROCEDURE — G0121 COLON CA SCRN NOT HI RSK IND: HCPCS | Mod: ,,, | Performed by: INTERNAL MEDICINE

## 2024-03-11 PROCEDURE — 37000009 HC ANESTHESIA EA ADD 15 MINS: Performed by: INTERNAL MEDICINE

## 2024-03-11 RX ORDER — SODIUM CHLORIDE 9 MG/ML
INJECTION, SOLUTION INTRAVENOUS CONTINUOUS
Status: DISCONTINUED | OUTPATIENT
Start: 2024-03-11 | End: 2024-03-11 | Stop reason: HOSPADM

## 2024-03-11 RX ORDER — LIDOCAINE HYDROCHLORIDE 20 MG/ML
INJECTION INTRAVENOUS
Status: DISCONTINUED | OUTPATIENT
Start: 2024-03-11 | End: 2024-03-11

## 2024-03-11 RX ORDER — PROPOFOL 10 MG/ML
VIAL (ML) INTRAVENOUS
Status: DISCONTINUED | OUTPATIENT
Start: 2024-03-11 | End: 2024-03-11

## 2024-03-11 RX ADMIN — PROPOFOL 50 MG: 10 INJECTION, EMULSION INTRAVENOUS at 11:03

## 2024-03-11 RX ADMIN — PROPOFOL 20 MG: 10 INJECTION, EMULSION INTRAVENOUS at 11:03

## 2024-03-11 RX ADMIN — SODIUM CHLORIDE: 0.9 INJECTION, SOLUTION INTRAVENOUS at 11:03

## 2024-03-11 RX ADMIN — LIDOCAINE HYDROCHLORIDE 100 MG: 20 INJECTION, SOLUTION INTRAVENOUS at 11:03

## 2024-03-11 NOTE — H&P
Short Stay Endoscopy History and Physical    PCP - Fred Dia MD  Referring Physician - Fred Dia MD  [address] [facility]  [place] [address]    Procedure - colonoscopy  ASA - per anesthesia  Mallampati - per anesthesia  History of Anesthesia problems - no  Family history Anesthesia problems -  no   Plan of anesthesia - General    HPI:  This is a 59 y.o. male here for evaluation of: positive cologuard    Reflux - no  Dysphagia - no  Abdominal pain - no  Diarrhea - no    ROS:  Constitutional: No fevers, chills, No weight loss  CV: No chest pain  Pulm: No cough, No shortness of breath  GI: see HPI    Medical History:  has a past medical history of Diabetes mellitus and Pulmonary emphysema (01/14/2024).    Surgical History:  has a past surgical history that includes Robot-assisted cholecystectomy (N/A, 1/15/2024).    Family History: family history is not on file..    Social History:  reports that he has quit smoking. He does not have any smokeless tobacco history on file. He reports that he does not drink alcohol and does not use drugs.    Review of patient's allergies indicates:  No Known Allergies    Medications:   Medications Prior to Admission   Medication Sig Dispense Refill Last Dose    alogliptin-pioglitazone 12.5-15 mg Tab Take by mouth once daily.       atorvastatin (LIPITOR) 40 MG tablet Take 1 tablet (40 mg total) by mouth once daily. 90 tablet 3     empagliflozin 10 mg Tab Take 10 mg by mouth once daily.       ergocalciferol (ERGOCALCIFEROL) 50,000 unit Cap Take 50,000 Units by mouth every 7 days.       insulin aspart protamine-insulin aspart (NOVOLOG 70/30) 100 unit/mL (70-30) InPn pen Inject into the skin 2 (two) times daily before meals.       insulin glargine (LANTUS) 100 unit/mL injection Inject into the skin every evening.       JANUMET -1,000 mg TM24 Take 1 tablet by mouth every morning.       losartan (COZAAR) 25 MG tablet Take 25 mg by mouth once daily.       VEMLIDY 25 mg Tab Take  1 tablet by mouth.          Physical Exam:    Vital Signs:   Vitals:    03/11/24 1048   BP: (!) 168/74   Pulse: (!) 59   Resp: 15   Temp: 97.7 °F (36.5 °C)       General Appearance: Well appearing in no acute distress  Lungs: no labored breathing  CVS:  regular rate  Abdomen: non tender    Labs:  Lab Results   Component Value Date    WBC 7.71 01/17/2024    HGB 12.2 (L) 01/17/2024    HCT 36.1 (L) 01/17/2024     01/17/2024     (H) 01/18/2024    AST 88 (H) 01/18/2024     01/18/2024    K 4.0 01/18/2024     01/18/2024    CREATININE 0.7 01/18/2024    BUN 13 01/18/2024    CO2 25 01/18/2024    INR 1.0 11/24/2015    HGBA1C 9.0 (H) 01/14/2024       I have explained the risks and benefits of this endoscopic procedure to the patient including but not limited to bleeding, inflammation, infection, perforation, and death.      Ruy Gray MD

## 2024-03-11 NOTE — ANESTHESIA PREPROCEDURE EVALUATION
03/11/2024  Malou Suarez is a 59 y.o., male.      Pre-op Assessment    I have reviewed the Patient Summary Reports.     I have reviewed the Nursing Notes. I have reviewed the NPO Status.   I have reviewed the Medications.     Review of Systems  Social:  Non-Smoker, No Alcohol Use       Hematology/Oncology:  Hematology Normal   Oncology Normal                                   EENT/Dental:  EENT/Dental Normal           Cardiovascular:     Hypertension           hyperlipidemia                             Pulmonary:   COPD                     Renal/:  Renal/ Normal                 Hepatic/GI:  Hepatic/GI Normal                 Musculoskeletal:  Musculoskeletal Normal                Neurological:  Neurology Normal                                      Endocrine:  Diabetes           Psych:  Psychiatric Normal                    Physical Exam  General: Well nourished, Cooperative, Alert and Oriented    Airway:  Mallampati: II / II  Mouth Opening: Normal  TM Distance: Normal  Tongue: Normal  Neck ROM: Normal ROM    Dental:  Intact    Chest/Lungs:  Clear to auscultation, Normal Respiratory Rate    Heart:  Rate: Normal  Rhythm: Regular Rhythm  Sounds: Normal        Anesthesia Plan  Type of Anesthesia, risks & benefits discussed:    Anesthesia Type: Gen Natural Airway  Intra-op Monitoring Plan: Standard ASA Monitors  Induction:  IV  Informed Consent: Informed consent signed with the Patient and all parties understand the risks and agree with anesthesia plan.  All questions answered.   ASA Score: 2  Day of Surgery Review of History & Physical: H&P Update referred to the surgeon/provider.    Ready For Surgery From Anesthesia Perspective.     .

## 2024-03-11 NOTE — PLAN OF CARE
Procedure and recovery complete. Pt awake and alert. MD Gray and family at bedside, procedure findings and suggestions discussed. Discharge instructions given, pt verbalized understanding of instruction. Iv was D/c'd, inner cannula intact. No distress noted. No c/o pain. Gait steady, able to ambulate without assistance. Pt walked out accompanied by family.

## 2024-03-11 NOTE — TRANSFER OF CARE
Anesthesia Transfer of Care Note    Patient: Malou Suarez    Procedure(s) Performed: Procedure(s) (LRB):  COLONOSCOPY (N/A)    Patient location: GI    Anesthesia Type: general    Transport from OR: Transported from OR on room air with adequate spontaneous ventilation    Post pain: adequate analgesia    Post assessment: no apparent anesthetic complications and tolerated procedure well    Post vital signs: stable    Level of consciousness: awake and alert    Nausea/Vomiting: no nausea/vomiting    Complications: none    Transfer of care protocol was followed      Last vitals: Visit Vitals  /64 (BP Location: Left arm, Patient Position: Lying)   Pulse 69   Temp 36.4 °C (97.6 °F) (Oral)   Resp 18   SpO2 98%

## 2024-03-11 NOTE — PROVATION PATIENT INSTRUCTIONS
Discharge Summary/Instructions after an Endoscopic Procedure  Patient Name: Malou Suarez  Patient MRN: [id number]  Patient YOB: 1965     Monday, March 11, 2024  Ruy Gray MD  Dear patient,  As a result of recent federal legislation (The Federal Cures Act), you may   receive lab or pathology results from your procedure in your [facility]   account before your physician is able to contact you. Your physician or   their representative will relay the results to you with their   recommendations at their soonest availability.  Thank you,  RESTRICTIONS:  During your procedure today, you received medications for sedation.  These   medications may affect your judgment, balance and coordination.  Therefore,   for 24 hours, you have the following restrictions:   - DO NOT drive a car, operate machinery, make legal/financial decisions,   sign important papers or drink alcohol.    ACTIVITY:  Today: no heavy lifting, straining or running due to procedural   sedation/anesthesia.  The following day: return to full activity including work.  DIET:  Eat and drink normally unless instructed otherwise.     TREATMENT FOR COMMON SIDE EFFECTS:  - Mild abdominal pain, nausea, belching, bloating or excessive gas:  rest,   eat lightly and use a heating pad.  - Sore Throat: treat with throat lozenges and/or gargle with warm salt   water.  - Because air was used during the procedure, expelling large amounts of air   from your rectum or belching is normal.  - If a bowel prep was taken, you may not have a bowel movement for 1-3 days.    This is normal.  SYMPTOMS TO WATCH FOR AND REPORT TO YOUR PHYSICIAN:  1. Abdominal pain or bloating, other than gas cramps.  2. Chest pain.  3. Back pain.  4. Signs of infection such as: chills or fever occurring within 24 hours   after the procedure.  5. Rectal bleeding, which would show as bright red, maroon, or black stools.   (A tablespoon of blood from the rectum is not serious, especially if    hemorrhoids are present.)  6. Vomiting.  7. Weakness or dizziness.  GO DIRECTLY TO THE NEAREST EMERGENCY ROOM IF YOU HAVE ANY OF THE FOLLOWING:      Difficulty breathing              Chills and/or fever over 101 F   Persistent vomiting and/or vomiting blood   Severe abdominal pain   Severe chest pain   Black, tarry stools   Bleeding- more than one tablespoon   Any other symptom or condition that you feel may need urgent attention  Your doctor recommends these additional instructions:  If any biopsies were taken, your doctors clinic will contact you in 1 to 2   weeks with any results.  - Discharge patient to home (ambulatory).   - Patient has a contact number available for emergencies.  The signs and   symptoms of potential delayed complications were discussed with the   patient.  Return to normal activities tomorrow.  Written discharge   instructions were provided to the patient.   - Resume previous diet.   - Continue present medications.   - Return to primary care physician as previously scheduled.   - Repeat colonoscopy in 10 years for screening purposes.  For questions, problems or results please call your physician - Ruy Gray MD at Work:  [phone].  [facility] can be reached at [phone]     IF A COMPLICATION OR EMERGENCY SITUATION ARISES AND YOU ARE UNABLE TO REACH   YOUR PHYSICIAN - GO DIRECTLY TO THE EMERGENCY ROOM.  Ruy Gray MD  3/11/2024 11:56:24 AM  This report has been verified and signed electronically.  Dear patient,  As a result of recent federal legislation (The Federal Cures Act), you may   receive lab or pathology results from your procedure in your [facility]   account before your physician is able to contact you. Your physician or   their representative will relay the results to you with their   recommendations at their soonest availability.  Thank you,  PROVATION

## 2024-03-12 NOTE — ANESTHESIA POSTPROCEDURE EVALUATION
Anesthesia Post Evaluation    Patient: Malou Suarez    Procedure(s) Performed: Procedure(s) (LRB):  COLONOSCOPY (N/A)    Final Anesthesia Type: general      Patient location during evaluation: PACU  Patient participation: Yes- Able to Participate  Level of consciousness: awake and alert  Post-procedure vital signs: reviewed and stable  Pain management: adequate  Airway patency: patent    PONV status at discharge: No PONV  Anesthetic complications: no      Respiratory status: spontaneous ventilation and room air  Hydration status: euvolemic  Follow-up not needed.              Vitals Value Taken Time   /72 03/11/24 1230   Temp 36.4 °C (97.6 °F) 03/11/24 1157   Pulse 56 03/11/24 1230   Resp 15 03/11/24 1230   SpO2 98 % 03/11/24 1230         Event Time   Out of Recovery 12:55:09         Pain/Oumou Score: Oumou Score: 10 (3/11/2024 12:30 PM)

## 2025-06-05 DIAGNOSIS — M79.641 PAIN OF RIGHT HAND: Primary | ICD-10-CM

## 2025-06-06 NOTE — PROGRESS NOTES
Assessment: 60 y.o. male with right rotator cuff tendinitis    I explained my diagnostic impression and the reasoning behind it in detail, using layman's terms     Plan:   - Mobic 15 mg PO QD x 2 weeks then PRN. The patient was advised that NSAID-type medications have important potential side effects: gastrointestinal irritation, GI bleeding, cardiac effects and renal injuries. Take the medication with food and to stop and call the office for any GI upset, vomiting, abdominal pain or black/bloody stools. The patient expresses understanding of these issues and questions were answered.  - Eyal and penny videos   - Return to clinic in 12 weeks. Return sooner if symptoms worsen or fail to improve.      All questions were answered in detail. The patient is in full agreement with the treatment plan and will proceed accordingly.    Chief Complaint   Patient presents with    Right Shoulder - Pain    Left Shoulder - Pain       Initial visit (6/9/25): Malou Suarez is a 60 y.o. male who presents today complaining of bilateral shoulder pain  R is much worse than L   Duration of symptoms:  about 5 -6 days   Trauma or new activity: no  Pain is constant  Aggravating factors: Reaching overhead, reaching behind back, laying on right side   Night pain is present and is disruptive to sleep  Radicular symptoms: no   Localizes pain to subdeltoid fossa    Associated symptoms:  limited range of motion.    Prior treatment:  ice without relief   Pain does interfere with sleep and activities of daily living .    [place]ian  #    Previously attempted treatments:   Activity modification:  not helpful   Ice:  not helpful   Heat:  not attempted   NSAIDs: not helpful   Tylenol: not helpful   PT:  not attempted   Injections: not attempted      This is the extent of the patient's complaints at this time.     Hand dominance: Right     Occupation: Fisherman      Review of patient's allergies indicates:  No Known Allergies    Physical Exam:    Vitals:    06/09/25 1151   PainSc:   3   PainLoc: Shoulder       General:  Patient is alert, awake and oriented to time, place and person. Mood and affect are appropriate.  Patient does not appear to be in any distress, denies any constitutional symptoms and appears stated age.   HEENT: Pupils are equal and round, sclera are not injected. External examination of ears and nose reveals no abnormalities. Cranial nerves II-X are grossly intact  Neck: examination demonstrates painless  active range of motion. Spurling's sign is negative  Skin: no rashes, abrasions or open wounds on the affected extremity   Resp: No respiratory distress or audible wheezing   CV: 2+  pulses, all extremities warm and well perfused   Left and Right Shoulder    Shoulder Range of Motion    Right     Left   (Active/Passive)       Forward Elevation     150/160            165/170  External rotation (arm at side)  50/50             50/50   Internal rotation behind the back  L3             L3     Range of motion is painful on R      Acromioclavicular joint is not tender  Crossbody test: negative    Neer's positive  Hawkin's positive    Tamara's positive  Drop arm negative  Belly press negative       Cuff Strength     Right     Left   Supraspinatus        5/5    5/5  Infraspinatus     5/5    5/5  Subscapularis     5/5    5/5    Deltoid testing            5/5    5/5    Fallon's test negative  Speeds negative  Yergasons negative  Mild tenderness over bicipital groove        Elbow examination demonstrates no tenderness to palpation and has normal range of motion.     ltsi C5-T1  + epl, io, fds, fdp   2+ RP      Imaging:  3 views of the bilateral shoulder:  positive for degenerative changes of the AC joint. The humeral head is well centered on the AP and axillary views.  No cortical changes of the greater tuberosity noted. There is not significant degenerative change of the glenohumeral joint or posterior subluxation of the humeral head. No acute  changes or fracture.      I personally reviewed and interpreted the patient's imaging obtained today in clinic     A note notifying Aaareferral Self of my findings was sent via the electronic medical record     This note was created by combination of typed  and M-Modal dictation. Transcription and phonetic errors may be present.  If there are any questions, please contact me.    Current Medications[1]    Past Medical History:   Diagnosis Date    Diabetes mellitus     Pulmonary emphysema 01/14/2024       Active Problem List with Overview Notes    Diagnosis Date Noted    Peripheral vascular disease, unspecified 01/30/2024    Cholecystitis, acute 01/17/2024    Hypertension 01/14/2024    Type 2 diabetes mellitus 01/14/2024    Acute left-sided thoracic back pain 01/14/2024    Hyperlipidemia 01/14/2024    Elevated d-dimer 01/14/2024    Pulmonary emphysema 01/14/2024    Positive colorectal cancer screening using Cologuard test 01/13/2024    Left upper quadrant pain 01/12/2024    Other chest pain 01/12/2024    Chest pain on breathing 11/24/2015    Bronchitis 11/24/2015       Past Surgical History:   Procedure Laterality Date    COLONOSCOPY N/A 3/11/2024    Procedure: COLONOSCOPY;  Surgeon: Ruy rGay MD;  Location: [facility] ENDO;  Service: Endoscopy;  Laterality: N/A;  Malawian   2/7 ref by Fred Dia MD, PEG, instr. mailed, cardiac clearance pending-st  seen by cardiology on 2/29-GT  3/5- lvm and portal msg for pc. DBM  3/7- left 2nd vm for pc- pt returned call, pc complete. DBM    ROBOT-ASSISTED CHOLECYSTECTOMY N/A 1/15/2024    Procedure: LAPAROSCOPIC CHOLECYSTECTOMY;  Surgeon: Maury Mack MD;  Location: [facility] OR;  Service: General;  Laterality: N/A;       Social History[2]           [1]   Current Outpatient Medications:     alogliptin-pioglitazone 12.5-15 mg Tab, Take by mouth once daily., Disp: , Rfl:     empagliflozin 10 mg Tab, Take 10 mg by mouth once daily., Disp: , Rfl:      ergocalciferol (ERGOCALCIFEROL) 50,000 unit Cap, Take 50,000 Units by mouth every 7 days., Disp: , Rfl:     insulin aspart protamine-insulin aspart (NOVOLOG 70/30) 100 unit/mL (70-30) InPn pen, Inject into the skin 2 (two) times daily before meals., Disp: , Rfl:     insulin glargine (LANTUS) 100 unit/mL injection, Inject into the skin every evening., Disp: , Rfl:     JANUMET -1,000 mg TM24, Take 1 tablet by mouth every morning., Disp: , Rfl:     losartan (COZAAR) 25 MG tablet, Take 25 mg by mouth once daily., Disp: , Rfl:     VEMLIDY 25 mg Tab, Take 1 tablet by mouth., Disp: , Rfl:     atorvastatin (LIPITOR) 40 MG tablet, Take 1 tablet (40 mg total) by mouth once daily., Disp: 90 tablet, Rfl: 3  [2]   Social History  Socioeconomic History    Marital status:    Tobacco Use    Smoking status: Former   Substance and Sexual Activity    Alcohol use: No    Drug use: No

## 2025-06-09 ENCOUNTER — OFFICE VISIT (OUTPATIENT)
Dept: ORTHOPEDICS | Facility: CLINIC | Age: 60
End: 2025-06-09
Attending: ORTHOPAEDIC SURGERY
Payer: COMMERCIAL

## 2025-06-09 ENCOUNTER — APPOINTMENT (OUTPATIENT)
Dept: RADIOLOGY | Facility: HOSPITAL | Age: 60
End: 2025-06-09
Attending: ORTHOPAEDIC SURGERY
Payer: COMMERCIAL

## 2025-06-09 DIAGNOSIS — M67.911 ROTATOR CUFF DYSFUNCTION, RIGHT: Primary | ICD-10-CM

## 2025-06-09 DIAGNOSIS — M79.641 PAIN OF RIGHT HAND: ICD-10-CM

## 2025-06-09 DIAGNOSIS — M25.512 BILATERAL SHOULDER PAIN, UNSPECIFIED CHRONICITY: Primary | ICD-10-CM

## 2025-06-09 DIAGNOSIS — M25.511 BILATERAL SHOULDER PAIN, UNSPECIFIED CHRONICITY: Primary | ICD-10-CM

## 2025-06-09 PROCEDURE — 4010F ACE/ARB THERAPY RXD/TAKEN: CPT | Mod: CPTII,S$GLB,, | Performed by: ORTHOPAEDIC SURGERY

## 2025-06-09 PROCEDURE — 99999 PR PBB SHADOW E&M-EST. PATIENT-LVL III: CPT | Mod: PBBFAC,,, | Performed by: ORTHOPAEDIC SURGERY

## 2025-06-09 PROCEDURE — 1159F MED LIST DOCD IN RCRD: CPT | Mod: CPTII,S$GLB,, | Performed by: ORTHOPAEDIC SURGERY

## 2025-06-09 PROCEDURE — 1160F RVW MEDS BY RX/DR IN RCRD: CPT | Mod: CPTII,S$GLB,, | Performed by: ORTHOPAEDIC SURGERY

## 2025-06-09 PROCEDURE — 99204 OFFICE O/P NEW MOD 45 MIN: CPT | Mod: S$GLB,,, | Performed by: ORTHOPAEDIC SURGERY

## 2025-06-09 PROCEDURE — 73130 X-RAY EXAM OF HAND: CPT | Mod: TC,FY,PN,RT

## 2025-06-09 PROCEDURE — 73130 X-RAY EXAM OF HAND: CPT | Mod: 26,RT,, | Performed by: RADIOLOGY

## 2025-06-09 RX ORDER — MELOXICAM 15 MG/1
15 TABLET ORAL DAILY
Qty: 30 TABLET | Refills: 0 | Status: SHIPPED | OUTPATIENT
Start: 2025-06-09

## 2025-07-08 RX ORDER — MELOXICAM 15 MG/1
15 TABLET ORAL
Qty: 30 TABLET | Refills: 0 | Status: SHIPPED | OUTPATIENT
Start: 2025-07-08

## (undated) DEVICE — HEMOCLIPS GREEN

## (undated) DEVICE — DRAPE COLUMN DAVINCI XI

## (undated) DEVICE — PACK ENDOSCOPY GENERAL

## (undated) DEVICE — COVER LIGHT HANDLE

## (undated) DEVICE — TUBING INSUFFLATION W/LUER LOK

## (undated) DEVICE — SUT ENDOLOOP PDSII 18 LIGA

## (undated) DEVICE — TROCAR KII BLLN 12MM 10CM

## (undated) DEVICE — CLIPPER BLADE MOD 4406 (CAREF)

## (undated) DEVICE — SUT VICRYL PLUS 4-0 P3 18IN

## (undated) DEVICE — CHLORAPREP W TINT 26ML APPL

## (undated) DEVICE — POSITIONER HEAD DONUT 9IN FOAM

## (undated) DEVICE — CLIP HEMO-LOK ML

## (undated) DEVICE — ELECTRODE REM PLYHSV RETURN 9

## (undated) DEVICE — TROCAR ENDOPATH XCEL 5X100MM

## (undated) DEVICE — STRIP MEDI WND CLSR 1/2X4IN

## (undated) DEVICE — NDL ECLIPSE SAFETY 18GX1-1/2IN

## (undated) DEVICE — DRESSING ADH ISLAND 2.5 X 3

## (undated) DEVICE — SUT ETHILON 3-0 BLK MONO FS

## (undated) DEVICE — DRAPE THREE-QUARTER 53X77IN

## (undated) DEVICE — BAG TISSUE RETRIEVAL 5MM

## (undated) DEVICE — APPLICATOR CHLORAPREP ORN 26ML

## (undated) DEVICE — SYR ONLY LUER LOCK 20CC

## (undated) DEVICE — BLANKET UPPER BODY 78.7X29.9IN

## (undated) DEVICE — SET TUBE PNEUMOCLEAR SE HI FLO

## (undated) DEVICE — SOL NS 1000CC

## (undated) DEVICE — GLOVE BIOGEL 7.5

## (undated) DEVICE — KIT ANTIFOG

## (undated) DEVICE — NDL HYPO REG 25G X 1 1/2

## (undated) DEVICE — Device

## (undated) DEVICE — SHEARS HARMONIC 36CM HD 1000I

## (undated) DEVICE — SOL IRR SOD CHL .9% POUR

## (undated) DEVICE — IRRIGATOR ENDOSCOPY DISP.

## (undated) DEVICE — SUT VICRYL+ 27 UR-6 VIOL

## (undated) DEVICE — SYR 10CC LUER LOCK